# Patient Record
Sex: FEMALE | Race: WHITE
[De-identification: names, ages, dates, MRNs, and addresses within clinical notes are randomized per-mention and may not be internally consistent; named-entity substitution may affect disease eponyms.]

---
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
## 2023-03-12 ENCOUNTER — HOSPITAL ENCOUNTER (EMERGENCY)
Age: 18
Discharge: HOME | End: 2023-03-12
Payer: COMMERCIAL

## 2023-03-12 VITALS
TEMPERATURE: 97.88 F | RESPIRATION RATE: 22 BRPM | HEART RATE: 106 BPM | SYSTOLIC BLOOD PRESSURE: 144 MMHG | OXYGEN SATURATION: 100 % | DIASTOLIC BLOOD PRESSURE: 102 MMHG

## 2023-03-12 VITALS — BODY MASS INDEX: 25.4 KG/M2

## 2023-03-12 DIAGNOSIS — F41.9: Primary | ICD-10-CM

## 2023-03-12 DIAGNOSIS — F17.290: ICD-10-CM

## 2023-03-12 PROCEDURE — 99283 EMERGENCY DEPT VISIT LOW MDM: CPT

## 2024-02-13 ENCOUNTER — HOSPITAL ENCOUNTER (OUTPATIENT)
Dept: HOSPITAL 100 - BFHLAB | Age: 19
Discharge: HOME | End: 2024-02-13
Payer: COMMERCIAL

## 2024-02-13 DIAGNOSIS — F41.1: ICD-10-CM

## 2024-02-13 DIAGNOSIS — Z00.01: Primary | ICD-10-CM

## 2024-02-13 LAB
ALANINE AMINOTRANSFER ALT/SGPT: 32 U/L (ref 13–56)
ALBUMIN SERPL-MCNC: 3.7 G/DL (ref 3.2–5)
ALKALINE PHOSPHATASE: 55 U/L (ref 45–117)
ANION GAP: 7 (ref 5–15)
AST(SGOT): 17 U/L (ref 15–37)
BUN SERPL-MCNC: 8 MG/DL (ref 7–18)
BUN/CREAT RATIO: 10.8 RATIO (ref 10–20)
CALCIUM SERPL-MCNC: 8.8 MG/DL (ref 8.5–10.1)
CARBON DIOXIDE: 24 MMOL/L (ref 21–32)
CHLORIDE: 110 MMOL/L (ref 98–107)
CHOLEST SERPL-MCNC: 169 MG/DL
DEPRECATED RDW RBC: 45.6 FL (ref 35.1–43.9)
ERYTHROCYTE [DISTWIDTH] IN BLOOD: 13.6 % (ref 11.6–14.6)
EST GLOM FILT RATE - AFR AMER: 128 ML/MIN (ref 60–?)
GLOBULIN: 3.7 G/DL (ref 2.2–4.2)
GLUCOSE: 85 MG/DL (ref 74–106)
HCT VFR BLD AUTO: 38.2 % (ref 37–47)
HGB BLD-MCNC: 12.2 G/DL (ref 12–15)
IMMATURE GRANULOCYTES COUNT: 0.01 X10^3/UL (ref 0–0)
MCV RBC: 91.2 FL (ref 81–99)
MEAN CORP HGB CONC: 31.9 G/DL (ref 32–36)
MEAN PLATELET VOL.: 10.6 FL (ref 6.2–12)
NRBC FLAGGED BY ANALYZER: 0 % (ref 0–5)
PLATELET # BLD: 246 K/MM3 (ref 150–450)
POTASSIUM: 3.8 MMOL/L (ref 3.5–5.1)
RBC # BLD AUTO: 4.19 M/MM3 (ref 4.2–5.4)
TRIGLYCERIDES: 55 MG/DL
VLDLC SERPL-MCNC: 11 MG/DL (ref 5–40)
WBC # BLD AUTO: 3.8 K/MM3 (ref 4.4–11)

## 2024-02-13 PROCEDURE — 36415 COLL VENOUS BLD VENIPUNCTURE: CPT

## 2024-02-13 PROCEDURE — 85025 COMPLETE CBC W/AUTO DIFF WBC: CPT

## 2024-02-13 PROCEDURE — 80061 LIPID PANEL: CPT

## 2024-02-13 PROCEDURE — 80053 COMPREHEN METABOLIC PANEL: CPT

## 2025-02-03 ENCOUNTER — HOSPITAL ENCOUNTER (OUTPATIENT)
Dept: HOSPITAL 100 - MTRAD | Age: 20
Discharge: HOME | End: 2025-02-03
Payer: COMMERCIAL

## 2025-02-03 DIAGNOSIS — M54.2: Primary | ICD-10-CM

## 2025-02-03 PROCEDURE — 72072 X-RAY EXAM THORAC SPINE 3VWS: CPT

## 2025-02-03 PROCEDURE — 72050 X-RAY EXAM NECK SPINE 4/5VWS: CPT

## 2025-02-03 NOTE — RAD_ITS
PROCEDURE:  
CERV SPINE 4 OR 5 VIEWS  
   
REASON FOR EXAM:  
Pain and stiffness.  
   
TECHNIQUE:  
5 views of the cervical spine.  
   
COMPARISON:  
None.  
   
FINDINGS:  
Normal vertebral body heights.  No visible fracture.  
Disc space heights are preserved.  
Normal alignment.  
Neural foramina are normal.  
Facets are unremarkable.  
Prevertebral soft tissues are unremarkable.  
   
ORDER #: [id number] RAD/Cerv Spine 4 or 5 Views  
IMPRESSION:  
NEGATIVE CERVICAL SPINE.  
   
   
Electronically Signed By: Ketan Martines on 02/03/2025 17:12  
Reading Location: [facility]

## 2025-02-03 NOTE — XMS RPT_ITS
Comprehensive CCD (C-CDA v2.1)  
  
                          Created on: February 3, 2025  
  
  
Miss Deanna Rowell  
External Reference #: CDR,PersonID:[id number]  
: 2005  
Sex: Female  
  
Demographics  
  
  
                                        Address             [address] [place]  [address]  
   
                                        Mobile Phone        [phone]  
   
                                        Home Phone          [phone]  
   
                                        Preferred Language  en  
   
                                        Marital Status      Single  
   
                                        [employer] Affiliation Unknown  
   
                                        Race                White  
   
                                        Ethnic Group        Not  or Lati  
no  
  
  
Author  
  
  
                                        Organization        [facility]  
  
  
Care Team Providers  
  
  
                                Care Team Member Name Role            Phone  
   
                                UNKNOWN, PCP    Unavailable     Unavailable  
   
                                ALIZE ANN  Unavailable     Unavailable  
   
                                [facility] Unavailable     Unavail  
able  
   
                                Ashley Sierra DO Primary Care Provider [id number]  
[phone]  
   
                                Ashley Sierra DO Primary Care Provider [id number]  
[phone]  
   
                                ASHLEY SIERRA  Primary Care    Unavailable  
   
                                ASHLEY SIERRA  Primary Care    Unavailable  
   
                                Ashley Sierra DO Primary Care Provider [phone]  
[phone]  
   
                                Ashley Sierra  Unavailable     Unavailable  
   
                                Александр Matt Unavailable     Unavailable  
   
                                Ashley Sierra  Unavailable     [phone]  
   
                                Rigo, Dr. Ashley Olmos [facility]    Unavailabl  
e  
   
                                Vernell, Dr. Александр ELAINE Attending       Unavaila  
ble  
   
                                Meenu Ng Attending       Unavailable  
   
                                Sierra, Dr. Ashley Olmos [facility]    Unavailabl  
e  
   
                                Ng, Meenu Referring       Unavailable  
   
                                NgMeenu Attending       Unavailable  
   
                                Sierra, Dr. Ashley Olmos [facility]    Unavailabl  
e  
   
                                Ng, Meenu Referring       Unavailable  
   
                                Ng, Meenu Attending       Unavailable  
   
                                Sierra, Dr. Ashley Olmos [facility]    Unavailabl  
e  
   
                                NgMeenu Referring       Unavailable  
   
                                NgMeenu Attending       Unavailable  
   
                                Sierra, Dr. Ashley Olmos [facility]    Unavailabl  
e  
   
                                Ng, Meenu Referring       Unavailable  
   
                                Sierra, Dr. Ashley Olmos [facility]    Unavailabl  
e  
   
                                Meenu Ng Attending       Unavailable  
   
                                Ashley Sierra DO Primary Care Provider [phone]  
[phone]  
   
                                AMALIA MARQUEZ Referring       Unavailable  
   
                                ASHLEY SIERRA  Primary Care    Unavailable  
   
                                AMALIA MARQUEZ Referring       Unavailable  
   
                                AMALIA MARQUEZ Attending       Unavailable  
   
                                ASHLEY SIERRA  Primary Care    Unavailable  
   
                                ASHLEY SIERRA  Primary Care    Unavailable  
   
                                SALLYSOCORRO PERLA  Attending       Unavailable  
   
                                ASHLEY SIERRA  Primary Care    Unavailable  
   
                                SOCORRO ZAVALA  Attending       Unavailable  
   
                                ASHLEY SIERRA  Primary Care    Unavailable  
   
                                SALLYSOCORRO  Referring       Unavailable  
   
                                ASHLEY SIERRA  Primary Care    Unavailable  
   
                                SALLY, SOCORRO  Attending       Unavailable  
   
                                ASHLEY SIERRA  Primary Care    Unavailable  
   
                                DAAMALIA ELLIS Attending       Unavailable  
   
                                DAFELICITASIRENADEREK, AMALIA Referring       Unavailable  
   
                                SIERRAASHLEY GAN  Primary Care    Unavailable  
   
                                DAHLIRENADEREK, AMALIA Referring       Unavailable  
   
                                SIERRA, ASHLEY GARCIA  Primary Care    Unavailable  
   
                                MONEY, NETO   Referring       Unavailable  
   
                                ASHLEY SIERRA  Primary Care    Unavailable  
   
                                AMALIA MARQUEZ Attending       Unavailable  
   
                                MONEY, NETO   Attending       Unavailable  
   
                                SIERRAASHLEY GAN  Primary Care    Unavailable  
   
                                MONEY, NETO   Attending       Unavailable  
   
                                SIERRAASHLEY  Primary Care    Unavailable  
   
                                SIERRAASHLEY GAN  Primary Care    Unavailable  
   
                                DAHLHAUSEN, AMALIA Referring       Unavailable  
  
  
  
Allergies  
  
  
                                                    Allergy   
Classification                          Reported   
Allergen(s)               Allergy Type              Date of   
Onset                     Reaction(s)               Facility  
   
                                                    colesevelam  
(1 source)   colesevelam  Drug Allergy 2022   Hives, Itching [facility]  
Work Phone:   
[phone]  
2  
   
                                                      
(20 sources)                            colesevelam;   
Translations:   
[COLESEVELAM]   Drug Allergy    2022      Hives, Itching  [facility]  
Work Phone:   
[phone]  
0  
   
                                                      
(20 sources)                            Amoxicillin;   
Translations:   
[amoxicillin]   Drug Allergy    2023      Rash            [facility]  
Work Phone:   
[phone]  
0  
   
                                                      
(1 source)   colesevelam  Drug Allergy              Rash, Hives  [facility]  
  
  
  
Medications  
Current Medications  
  
  
  
                      Medication Drug Class(es) Dates      Sig (Normalized) Sig   
(Original)  
   
                                                    amitriptyline   
hydrochloride 10 mg   
oral tablet  
(20 sources)                            Tricyclic   
Antidepressant                          Start:   
2023                                          Amitriptyline HCl   
- 10 MG Oral   
Tablet Quantity: 0   
Refills: 0   
Ordered:   
2023 DO   
Start :   
2023 Active  
  
  
  
                                                    Start: 2023  
End: 04-                         take 1 tablet by mouth once   
daily at bedtime                        amitriptyline (ELAVIL) 10 mg tablet   
Indications: Irritable bowel   
syndrome with diarrhea Take 1 tablet   
by mouth daily at bedtime. 30 tablet   
5 10/13/2023 Active  
  
  
  
                                        Comment on above:   Take 1 tablet by saleem  
th daily at bedtime.   
   
                                                    clotrimazole 10 mg   
oral lozenge  
(4 sources)               Azole Antifungal          Start:   
  
3  
End:   
  
3                                                   clotrimazole   
(MYCELEX) 10 mg   
vic Use 1 Vic   
as instructed five   
times daily for 14   
days. 70 tablet 0   
2023 Active  
   
                                        Comment on above:   Use 1 Vic as inst  
ructed five times daily for 14 days.   
   
                                                    doxycycline hyclate   
100 mg oral capsule  
(2 sources)                             Tetracycline-class   
Drug                                    Start:   
  
End:   
                                       take 1 capsule   
by mouth twice   
daily                                   doxycycline hyclate   
(VIBRAMYCIN) 100 mg   
capsule Take 1   
capsule by mouth two   
times a day for 7   
days. 14 capsule 0   
2024 Active  
  
  
  
                                                    Start: 2022  
End: 2022                         take 1 tablet by mouth twice   
daily                                   doxycycline (VIBRA-TABS) 100 mg   
tablet Indications: Chlamydial   
infection Take 1 tablet by mouth   
twice daily for 7 days. 14 tablet 0   
2022 Active  
  
  
  
                                        Comment on above:   Take 1 tablet by saleem  
 twice daily for 7 days.   
   
                                                            Take 1 capsule by mo  
[facility] two times a day for 7 days.   
   
                                                    Ethinyl Estradiol /   
Norethindrone  
(20 sources)              Estrogen                  Start:   
2024                              take 1 tablet by   
mouth once daily                        Norethin-Eth   
Estrad Triphasic   
(ORTHO-NOVUM   
, ,)   
0.5/0.75/1 mg- 35   
mcg per tablet   
Take 1 tablet by   
mouth once daily.   
84 tablet 3   
2024 Active  
  
  
  
                                                    Start: 2024  
End: 2024                         take 1 tablet by mouth once   
daily                                   Norethin-Eth Estrad Triphasic   
(ORTHO-NOVUM , 28,) 0.5/0.75/1   
mg- 35 mcg per tablet Take 1 tablet   
by mouth once daily. 84 tablet 3   
2024 Active  
   
                                                    Start: 2023  
End: 2024                         take 1 tablet by mouth once   
daily                                   Norethin-Eth Estrad Triphasic   
(ORTHO-NOVUM , ,) 0.5/0.75/1   
mg- 35 mcg per tablet Take 1 tablet   
by mouth once daily. 84 tablet 3   
2023 Discontinued  
   
                                        Start: 2023   take 1 tablet by saleem  
th once   
daily                                   Norethin-Eth Estrad Triphasic   
(ORTHO-NOVUM , ,) 0.5/0.75/1   
mg- 35 mcg per tablet Take 1 tablet   
by mouth once daily. 84 tablet 3   
2023 Active  
   
                                                    Start: 2023  
End: 2023                         take 1 tablet by mouth once   
daily                                   Norethin-Eth Estrad Triphasic   
(ORTHO-NOVUM , ,) 0.5/0.75/1   
mg- 35 mcg per tablet Take 1 tablet   
by mouth once daily. 84 tablet 3   
2023 Active  
   
                                        Start: 04-   take 1 tablet by saleem  
th once   
daily                                   Norethin-Eth Estrad Triphasic   
(ORTHO-NOVUM , ,) 0.5/0.75/1   
mg- 35 mcg per tablet Take 1 tablet   
by mouth once daily. 28 tablet 0   
04/10/2023 Active  
   
                                                    Start: 2023  
End: 04-                         take 1 tablet by mouth once   
daily                                   Norethin-Eth Estrad Triphasic   
(ORTHO-NOVUM , 28,) 0.5/0.75/1   
mg- 35 mcg per tablet Take 1 tablet   
by mouth once daily. 28 tablet 0   
2023 04/10/2023 Discontinued  
   
                                        Start: 2022   take 1 tablet by saleem  
th once   
daily                                   Norethin-Eth Estrad Triphasic   
(ORTHO-NOVUM , ,) 0.5/0.75/1   
mg- 35 mcg per tablet Take 1 tablet   
by mouth once daily. 84 tablet 3   
2022 Active  
  
  
  
                                        Comment on above:   Take 1 tablet by saleem  
th once daily.   
   
                                                    fluconazole 150 mg   
oral tablet  
(1 source)                Azole Antifungal          Start:   
  
End:   
                                     take 1 tablet by   
mouth once daily                        fluconazole   
(DIFLUCAN) 150 mg   
tablet Take 1 tablet   
by mouth once daily   
for 1 day. 1 tablet 0   
2022   
Active  
   
                                        Comment on above:   Take 1 tablet by saleem  
th once daily for 1 day.   
   
                                                    gabapentin 100 mg   
oral capsule  
(5 sources)                             Anti-epileptic   
Agent                                   Start:   
10-13-2  
023  
End:   
                                     take 3 capsules by   
mouth twice daily                       gabapentin   
(NEURONTIN) 100 mg   
capsule Take 3   
capsules by mouth two   
times a day for 90   
days. 180 capsule 2   
10/13/2023 2024   
Active  
   
                                        Comment on above:   Take 3 capsules by m  
outh two times a day for 90 days.   
   
                                                    iv contrast (will   
be provided with   
radiology test)  
(2 sources)                                         Start:   
  
End:   
                                     inject 1 dose   
intravenously once                      iv contrast (will be   
provided with   
radiology test) MRI   
Brain Inject,   
intravenously, once   
for 1 dose.No IV   
access, insert saline   
lock prior to   
beginning of   
sedation, infusion,   
injection of imaging   
exam.Discontinue   
saline lock post   
exam. If Pt. has a   
central line or IVAD,   
may access for   
administration   
according to line   
specific nursing   
protocol.Once exam is   
complete flush line   
and de-access   
according to line   
specific nursing   
protocol in the MR   
contrast   
administration   
guidelines link 1   
Each 0 2024 Active  
  
  
  
                                                    Start: 10-  
End: 10-           inject 1 dose intravenously once iv contrast (will be   
provided   
with   
radiology test) CTA Head/Neck W No   
IV access, insert saline lock   
prior to the sedation, infusion,   
injection for imaging exam.   
Discontinue saline lock post exam.   
If Pt. has a central line or IVAD,   
may access for administration   
according to line specific nursing   
protocol. Once exam is complete   
flush line and de-access according   
to line specific nursing protocol   
in the CT contrast administration   
guidelines link. 1 Each 0   
10/13/2023 10/14/2023 Active  
  
  
  
                                        Comment on above:   CTA Head/Neck W No I  
V access, insert saline lock prior to the   
sedation, infusion, injection for imaging exam. Discontinue saline   
lock post exam. If Pt. has a central line or IVAD, may access for   
administration according to line specific nursing protocol. Once   
exam is complete flush line and de-access according to line   
specific nursing protocol in the CT contrast administration   
guidelines link.   
   
                                                    methylPREDNISolone  
(12 sources)              Corticosteroid            Start:   
2024  
End:   
2024                                          methylPREDNISolone (MEDROL,   
SHANNON,) 4 mg Dose-Pack Take   
according to instructions on   
package 21 tablet 0   
2024 Active  
  
  
  
                                Start: 2023                 methylPREDNISo  
lone (MEDROL DOSE-PACK) 4 mg Dose-Pack   
Indications:   
Headache, unspecified headache type As Instructed per package 21   
tablet 0 2023 Active  
  
  
  
                                        Comment on above:   As Instructed per john ying   
   
                                                    metroNIDAZOLE 500 mg   
oral tablet  
(1 source)                              Nitroimidazole   
Antimicrobial                           Start:   
  
End:   
                                     take 1 tablet   
by mouth twice   
daily                                   metroNIDAZOLE   
(FLAGYL) 500 mg   
tablet Indications:   
Bacterial vaginosis   
Take 1 tablet by   
mouth twice daily for   
7 days. 14 tablet 0   
2022   
Active  
   
                                        Comment on above:   Take 1 tablet by saleem  
 twice daily for 7 days.   
   
                                                    naproxen 500 mg oral   
tablet  
(4 sources)                             Nonsteroidal   
Anti-inflammatory   
Drug                                    Start:   
10-13-2  
023  
End:   
                                     take 1 tablet   
by mouth every   
twelve hours as   
needed                                  naproxen (NAPROSYN)   
500 mg tablet Take 1   
tablet by mouth two   
times a day as needed   
(for pain. Take with   
food.). 30 tablet 0   
10/13/2023 2023   
Active  
   
                                        Comment on above:   [facility] (for pain.   
Take   
with food.).   
   
                                                    nitrofurantoin,   
macrocrystals 25 mg /   
nitrofurantoin,   
monohydrate 75 mg   
oral capsule  
(2 sources)                             Nitrofuran   
Antibacterial                           Start:   
  
End:   
                                     take 1 capsule   
by mouth twice   
daily at   
mealtime                                nitrofurantoin   
monohydrate and   
macrocrystal   
(MACROBID) 100 mg   
capsule Take 1   
capsule by mouth   
twice daily with   
meals for 5 days. 10   
capsule 0 2022 Active  
   
                                        Comment on above:   Take 1 capsule by mo  
uth twice daily with meals for 5 days.   
   
                                                    ondansetron 4 mg oral   
tablet  
(20 sources)                            Serotonin-3 Receptor   
Antagonist                              Start:   
  
End:   
                                     take 1 tablet   
by mouth every   
twelve hours as   
needed                                  ondansetron (ZOFRAN)   
4 mg tablet Take 1   
tablet by mouth every   
12 hours as needed   
for nausea/vomiting   
(for nausea.). 56   
tablet 2 2023 Active  
  
  
  
                                                    Start: 2023  
End: 2023                         take 1 tablet by mouth every   
eight hours as needed for   
nausea                                  ondansetron orally disintegrating   
(ZOFRAN ODT) 4 mg disintegrating   
tablet Indications: Nausea and   
vomiting, unspecified vomiting type   
Take 1 tablet by mouth every 8 hours   
as needed for nausea/vomiting. 10   
tablet 0 2023   
Discontinued  
   
                                                            take 1 tablet by saleem  
th every   
eight hours as needed                   ondansetron (ZOFRAN) 4 mg tablet Take   
4 mg by mouth every 8 hours as needed.   
Taking as needed. Active  
  
  
  
                                        Comment on above:   Take 1 tablet by saleem  
th every 8 hours as needed for   
nausea/vomiting.   
   
                                                            Take 1 tablet by saleem  
th every 12 hours as needed for   
nausea/vomiting (for nausea.).   
   
                                                            Take 4 mg by mouth e  
very 8 hours as needed. Taking as needed.   
   
                                                    rimegepant 75 mg   
disintegrating oral   
tablet  
(2 sources)                                         Start:   
  
End:   
                                     take 1   
tablet by   
mouth once   
daily as   
needed                                  rimegepant (NURTEC   
ODT) 75 mg   
disintegrating   
tablet Indications:   
Migraine with aura   
and without status   
migrainosus, not   
intractable Take 1   
tablet by mouth once   
daily as needed (at   
onset of migraine).   
8 tablet 5   
2024 Active  
   
                                                    rizatriptan 5 mg   
disintegrating oral   
tablet  
(4 sources)                             Serotonin-1b and   
Serotonin-1d Receptor   
Agonist                                 Start:   
  
End:   
                                     take 1   
tablet by   
mouth every   
two hours as   
needed                                  rizatriptan 5 mg   
disintegrating   
tablet Take 1 tablet   
(5 mg) by mouth as   
needed. May repeat   
dose after 2 hours   
if needed. Maximum   
daily dose is 30 mg   
per day. 12 tablet   
2024 Active  
   
                                                    sulfamethoxazole 800 mg   
/ trimethoprim 160 mg   
oral tablet  
(4 sources)                             Dihydrofolate Reductase   
Inhibitor   
Antibacterial,   
Sulfonamide   
Antimicrobial                           Start:   
  
023  
End:   
                                     take 1   
tablet by   
mouth twice   
daily                                   sulfamethoxazole-tri  
methoprim (BACTRIM   
DS) 800-160 mg per   
tablet Take 1 tablet   
by mouth twice daily   
for 7 days. 14   
tablet 0 2023 Active  
  
  
  
                                                    Start: 08-  
End: 2022                         take 1 tablet by mouth   
twice daily                             sulfamethoxazole-trimethoprim (BACTRIM D  
S)   
800-160 mg per tablet Take 1 tablet by mouth   
twice daily for 7 days. 14 tablet 0   
08/10/2022 2022 Active  
   
                                                    Start: 2022  
End: 2022                         take 1 tablet by mouth   
twice daily                             sulfamethoxazole-trimethoprim (BACTRIM D  
S)   
800-160 mg per tablet Indications: Finger   
infection Take 1 tablet by mouth twice daily   
for 10 days. 20 tablet 0 2022 Active  
  
  
  
                                        Comment on above:   Take 1 tablet by saleem  
 twice daily for 10 days.   
   
                                                            Take 1 tablet by saleem  
 twice daily for 7 days.   
   
                                                    topiramate 25 mg   
oral tablet  
(7 sources)                                         Start:   
  
4  
End:   
03-  
5                                       take 1 tablet by   
mouth once daily   
at bedtime                              topiramate   
(TOPAMAX) 25 mg   
tablet Take 1   
tablet by mouth   
daily at bedtime.   
30 tablet 2   
12/10/2024   
03/10/2025 Active  
   
                                                    vancomycin 125 mg   
oral capsule  
(3 sources)                             Glycopeptide   
Antibacterial                           Start:   
  
3  
End:   
  
3                                       take 1 capsule   
by mouth four   
times daily                             vancomycin   
(VANCOCIN) 125 mg   
capsule   
Indications: C.   
difficile colitis   
Take 1 capsule by   
mouth four times   
daily for 10 days.   
40 capsule 0   
2023 Active  
   
                                        Comment on above:   Take 1 capsule by mo  
uth four times daily for 10 days.   
  
  
  
Completed/Discontinued Medications  
  
  
  
                      Medication Drug Class(es) Dates      Sig (Normalized) Sig   
(Original)  
   
                                                    acetaminophen 21.7   
mg/ml / butalbital   
3.33 mg/ml /   
caffeine 2.67 mg/ml   
oral solution  
(3 sources)                             Barbiturate, Central   
Nervous System   
Stimulant,   
Methylxanthine                          Start:   
08-  
End:   
2024                                          acetaminophen 325   
mg-caffeine 40   
mg-butalbital 50   
mg (FIORICET) per   
15 mL oral liquid   
08/15/2024   
2024   
Discontinued  
   
                                                    amoxicillin 500 mg   
oral capsule  
(3 sources)                             Penicillin-class   
Antibacterial                           Start:   
04-  
End:   
2023                              take 1 capsule by   
mouth twice daily                       amoxicillin   
(AMOXIL) 500 mg   
capsule Take 1   
capsule by mouth   
twice daily for 10   
days. 20 capsule 0   
04/10/2023   
2023   
Discontinued   
(Allergic   
response)  
   
                                        Comment on above:   Take 1 capsule by mo  
uth twice daily for 10 days.   
   
                                                    azithromycin 250 mg   
oral tablet  
(2 sources)                             Macrolide   
Antimicrobial                           Start:   
2023  
End:   
2023                                          azithromycin   
(ZITHROMAX) 250 mg   
tablet TAKE 2   
TABLETS BY MOUTH   
ON DAY 1, AND THEN   
TAKE 1 TABLET BY   
MOUTH ONCE A DAY   
ON DAY 2 THROUGH   
DAY 5 0 2023   
Discontinued   
(Course of therapy   
completed)  
  
  
  
                                                    Start: 2022  
End: 2022           take 2 tablets by mouth once azithromycin (ZITHROMAX)   
500 mg   
tablet   
Take 2 tablets by mouth one time only   
for 1 dose. 2 tablet 0 2022 Active  
  
  
  
                                        Comment on above:   Take 2 tablets by [facility] one time only for 1 dose.   
   
                                                            TAKE 2 TABLETS BY [facility] ON DAY 1, AND THEN TAKE 1 TABLET   
BY MOUTH ONCE A DAY ON DAY 2 THROUGH DAY 5   
   
                                                    busPIRone hydrochloride 7.5   
mg oral tablet  
(20 sources)                                        Start:   
2023                                          busPIRone HCl - 7.5 MG   
Oral Tablet Quantity:   
0 Refills: 0 Ordered:   
2023 DO Start :   
2023 Active  
  
  
  
                                                    Start: 2023  
End: 10-                         take 1 tablet by mouth twice   
daily                                   busPIRone (BUSPAR) 7.5 mg tablet   
Indications: Generalized anxiety   
disorder with panic attacks Take 1   
tablet by mouth twice daily. 180   
tablet 1 2023 10/18/2023   
Active  
   
                                                            take 1 tablet by saleem  
 once   
daily at bedtime                        busPIRone (BUSPAR) 7.5 mg tablet   
Take 7.5 mg by mouth daily at   
bedtime. Active  
  
  
  
                                        Comment on above:   Take 7.5 mg by mouth  
 twice daily.   
   
                                                            Take 1 tablet by saleem  
th twice daily.   
   
                                                            Take 7.5 mg by mouth  
 daily at bedtime.   
   
                                                    cephalexin 500 mg oral   
capsule  
(6 sources)                             Cephalosporin   
Antibacterial                           Start:   
2023  
End:   
2023                                          Cephalexin 500 MG Oral   
Capsule Quantity: 0   
Refills: 0 Ordered:   
2023 DO Start :   
2023 End :   
1-Aug-2023 Complete  
  
  
  
                                                    Start: 2023  
End: 2023                         take 1 capsule by mouth twice   
daily                                   cephALEXin (KEFLEX) 500 mg capsule   
Take 1 capsule by mouth twice daily   
for 7 days. 14 capsule 0 2023 Active  
   
                                                    Start: 2023  
End: 2023                         take 1 capsule by mouth twice   
daily                                   cephALEXin (KEFLEX) 500 mg capsule   
Take 1 capsule by mouth twice daily   
for 10 days. 20 capsule 0   
2023 Active  
   
                                                    Start: 2023  
End: 2023                         take 1 capsule by mouth twice   
daily                                   cephALEXin (KEFLEX) 500 mg capsule   
Take 1 capsule by mouth twice daily   
for 7 days. 14 capsule 0 2023 Active  
  
  
  
                                        Comment on above:   Take 1 capsule by mo  
uth twice daily for 7 days.   
   
                                                            Take 1 capsule by mo  
uth twice daily for 10 days.   
   
                                                    Dasetta    
0.5/0.75/1-35 MG-MCG   
Oral Tablet  
(4 sources)                                         Start:   
20                                                  Dasetta    
0.5/0.75/1-35   
MG-MCG Oral Tablet   
Quantity: 0   
Refills: 0 Ordered:   
2023 DO   
Start : 2023   
Active  
   
                                                    fluticasone propionate   
0.05 mg/actuat metered   
dose nasal spray  
(9 sources)               Corticosteroid            Start:   
20                                      take 2   
spray(s) by   
mouth once   
daily                                   fluticasone   
(FLONASE) 50   
mcg/actuation nasal   
spray Indications:   
Otitis media with   
effusion, right Use   
2 Sprays in each   
nostril once daily.   
Rinse mouth after   
use. 1 Each 1   
2023 Active  
   
                                        Comment on above:   Use 2 Sprays in each  
 nostril once daily. Rinse mouth after   
use.   
   
                                                    10 ml lidocaine   
hydrochloride 10 mg/ml   
injection  
(1 source)                              Antiarrhythmic, Amide   
Local Anesthetic                        Start:   
20  
End:   
20                                                  lidocaine (PF) 10   
mg/mL (1 %) 4 mL   
injection   
(XYLOCAINE)  
  
  
  
                                                    Start: 2023  
End: 2023                                     lidocaine (PF) 10 mg/mL (1 %  
) 4 mL injection (XYLOCAINE)  
  
  
  
                                                    loperamide   
hydrochloride 2 mg   
oral tablet  
(10 sources)              Opioid Agonist            Start:   
2023                                          loperamide HCl   
(IMODIUM) 2 mg tab   
Indications: Chronic   
diarrhea Take 1   
tablet by mouth as   
needed. Take 2 tabs   
first thing in the   
AM and one   
additional tab after   
each episode up to 8   
tabs per day. 240   
tablet 2 2023   
Active  
   
                                        Comment on above:   Take 1 tablet by saleem  
th as needed. Take 2 tabs first thing in   
the   
AM and one additional tab after each episode up to 8 tabs per   
day.   
   
                                                    magnesium oxide 420   
mg oral tablet  
(11 sources)                                        Start:   
2023                              take 0.9524   
tablet by   
mouth once   
daily                                   Magnesium Oxide 420   
mg tab Indications:   
Nausea and vomiting,   
unspecified vomiting   
type , Headache,   
unspecified headache   
type Take 0.9524   
tablets by mouth   
once daily. 30   
tablet 0 2023   
Active  
   
                                        Comment on above:   Take 0.9524 tablets   
by mouth once daily.   
   
                                                    pseudoephedrine   
hydrochloride 30 mg   
oral tablet  
(9 sources)                             alpha-Adrenergic   
Agonist                                 Start:   
2023                              take 1   
tablet by   
mouth every   
four hours   
as needed                               pseudoephedrine   
(SUDAFED) 30 mg   
tablet Indications:   
Otitis media with   
effusion, right Take   
1 tablet by mouth   
every 4 hours as   
needed for   
cold/allergy   
symptoms. 30 tablet   
1 2023 Active  
   
                                        Comment on above:   Take 1 tablet by saleem  
th every 4 hours as needed for   
cold/allergy   
symptoms.   
   
                                                    1 ml triamcinolone   
acetonide 40 mg/ml   
injection  
(1 source)                Corticosteroid            Start:   
2023  
End: 2023                                     triamcinolone   
acetonide 40 mg   
injection (KeNALog   
40)  
  
  
  
                                                    Start: 2023  
End: 2023                                     triamcinolone acetonide 40 m  
g injection (KeNALog 40)  
  
  
  
Problems  
Active Problems  
  
  
                      Problem Classification Problem    Date       Documented Da  
te Episodic/Chronic  
   
                                                    Allergic reactions  
(1 source)                              Allergy status to   
penicillin;   
Translations:   
[Allergy status to   
penicillin]                             Onset:   
2023                                          Episodic  
   
                                                    Anxiety disorders  
(4 sources)                             Anxiety;   
Translations:   
[Anxiety disorder,   
unspecified]                                                Chronic  
   
                                                    Crushing injury or   
internal injury  
(10 sources)                            Crushing injury of   
finger; Translations:   
[Crushing injury of   
finger(s)]                              Onset:   
2023                Episodic  
   
                                                    Diseases of mouth;   
excluding dental  
(1 source)                              Disorder of tongue;   
Translations: [Other   
disturbances of oral   
epithelium, including   
tongue]                                                     Episodic  
   
                                                    E Codes: Other   
specified and   
classifiable  
(1 source)                              Caught, crushed,   
jammed, or pinched   
between moving   
objects, initial   
encounter;   
Translations:   
[Caught, crush,   
jammed, or pinched   
betw moving objects,   
init]                                   Onset:   
2023                                          Episodic  
   
                                                    External Injury -   
Transport; not MVT  
(1 source)                              Animal-rider injured   
by fall from or being   
thrown from horse in   
noncollision   
accident, initial   
encounter;   
Translations:   
[ANIML-RIDR INJURED   
BY FALL FR HORSE IN   
NONCLSN ACC, INIT]                      Onset:   
10-                                            
   
                                                    Genitourinary symptoms   
and ill-defined   
conditions  
(3 sources)                             Increased frequency   
of urination;   
Translations:   
[Frequency of   
micturition]                                                Episodic  
   
                                                    Headache; including   
migraine  
(20 sources)                            Episodic tension-type   
headache;   
Translations:   
[Episodic   
tension-type   
headache, not   
intractable]                            Onset:   
10-                10-                Chronic  
   
                                                    Headache; including   
migraine  
(3 sources)                             Headache;   
Translations:   
[Headache,   
unspecified headache   
type]                                                       Episodic  
   
                                                    Headache; including   
migraine  
(1 source)                              Headache; including   
migraine;   
Translations: [New   
onset headache]                         Onset:   
2024                                            
   
                                                    Immunizations and   
screening for   
infectious disease  
(1 source)                              Patient encounter   
status; Translations:   
[Encounter for   
screening for   
infections with a   
predominantly sexual   
mode of transmission]                                         Episodic  
   
                                                    Inflammatory diseases   
of female pelvic   
organs  
(1 source)                              Abscess of vulva;   
Translations:   
[Abscess of vulva]                                          Episodic  
   
                                                    Nausea and vomiting  
(8 sources)                             Nausea with vomiting,   
unspecified;   
Translations:   
[Vomiting without   
nausea]                                 Onset:   
10-                                          Episodic  
   
                                                    Noninfectious   
gastroenteritis  
(1 source)                              Chronic diarrhea;   
Translations:   
[Noninfective   
gastroenteritis and   
colitis, unspecified]                                         Episodic  
   
                                                    Nonspecific chest pain  
(2 sources)                             Chest pain,   
unspecified;   
Translations: [Chest   
discomfort]                             Onset:   
2022                                          Episodic  
   
                                                    Open wounds of   
extremities  
(2 sources)                             Avulsion injury of   
fingernail;   
Translations:   
[Unspecified open   
wound of unspecified   
finger with damage to   
nail, subsequent   
encounter]                              Onset:   
2023                                          Episodic  
   
                                                    Other connective   
tissue disease  
(1 source)                              Biceps tendinitis;   
Translations:   
[Bicipital   
tendinitis, left   
shoulder]                                                   Episodic  
   
                                                    Other ear and sense   
organ disorders  
(1 source)                              Impacted cerumen in   
right ear;   
Translations:   
[Impacted cerumen,   
right ear]                                                  Episodic  
   
                                                    Other female genital   
disorders  
(1 source)                              Vaginal discharge;   
Translations: [Other   
specified   
noninflammatory   
disorders of vagina]                                         Episodic  
   
                                                    Other gastrointestinal   
disorders  
(2 sources)                             Irritable bowel   
syndrome with   
diarrhea;   
Translations:   
[Irritable bowel   
syndrome with   
diarrhea]                                                   Chronic  
   
                                                    Other gastrointestinal   
disorders  
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   
milk; Translations:   
[Malabsorption due to   
intolerance, not   
elsewhere classified]                                         Chronic  
   
                                                    Other gastrointestinal   
disorders  
(1 source)                              Diarrhea,   
unspecified;   
Translations: [Nausea   
vomiting and   
diarrhea]                               Onset:   
2022                                          Episodic  
   
                                                    Other injuries and   
conditions due to   
external causes  
(1 source)                              Neurapraxia;   
Translations: [Injury   
to nerves,   
unspecified site]                       2023          Episodic  
   
                                                    Other injuries and   
conditions due to   
external causes  
(1 source)                              Injury of digital   
nerve of right little   
finger, initial   
encounter;   
Translations: [Injury   
of digital nerve of   
right little finger,   
init encntr]                            Onset:   
2023                                          Episodic  
   
                                                    Other injuries and   
conditions due to   
external causes  
(4 sources)                             Injury of head;   
Translations:   
[Unspecified injury   
of head, subsequent   
encounter]                              10-          Episodic  
   
                                                    Other nervous system   
disorders  
(2 sources)                             Anesthesia of skin;   
Translations:   
[Anesthesia of skin]                    Onset:   
2023                                          Episodic  
   
                                                    Other nervous system   
disorders  
(1 source)                              Paresthesia of skin;   
Translations:   
[Paresthesia of skin]                   Onset:   
2023                                          Episodic  
   
                                                    Other screening for   
suspected conditions   
(not mental disorders   
or infectious disease)  
(1 source)                              CT of abdomen   
abnormal;   
Translations:   
[Abnormal findings on   
diagnostic imaging of   
other abdominal   
regions, including   
retroperitoneum]                                            Episodic  
   
                                                    Other skin disorders  
(1 source)                              Eruption;   
Translations: [Rash   
and other nonspecific   
skin eruption]                                              Episodic  
   
                                                    Other skin disorders  
(1 source)                              Folliculitis;   
Translations:   
[Follicular disorder,   
unspecified]                            2024          Episodic  
   
                                                    Other upper   
respiratory infections  
(5 sources)                             Streptococcal sore   
throat; Translations:   
[Streptococcal   
pharyngitis]                                                Episodic  
   
                                                    Otitis media and   
related conditions  
(1 source)                              Otitis media;   
Translations:   
[Unspecified   
nonsuppurative otitis   
media, right ear]                                           Episodic  
   
                                                    Residual codes;   
unclassified  
(2 sources)                             Pain; Translations:   
[Pain, unspecified]                                         Episodic  
   
                                                    Residual codes;   
unclassified  
(1 source)                              Procedure not done;   
Translations:   
[Procedure and   
treatment not carried   
out, unspecified   
reason]                                 10-          Episodic  
   
                                                    Skin and subcutaneous   
tissue infections  
(1 source)                              Infection of finger;   
Translations: [Local   
infection of the skin   
and subcutaneous   
tissue, unspecified]                                         Episodic  
   
                                                    Substance-related   
disorders  
(1 source)                              Nicotine dependence,   
other tobacco   
product,   
uncomplicated;   
Translations:   
[Nicotine dependence,   
other tobacco   
product,   
uncomplicated]                          Onset:   
2023                                          Chronic  
   
                                                    Unclassified  
(2 sources)                             [facility] [place]   
[facility] RT PINKY IN   
DOOR,, [facility] CLAIM                        2023            
   
                                        Comment on above:   [facility] [place]  
[facility] RT PINKY IN DOOR,, [facility] CLAIM   
   
                                                    Unclassified  
(1 source)                              Crushing injury of   
finger, initial   
encounter                               2023            
   
                                                    Unclassified  
(1 source)      Neurapraxia                     2023        
   
                                                    Unclassified  
(1 source)                              Caught,crush,jam or   
pinch betw a moving   
statnry obj,init;   
Translations:   
[Caught,crush,jam or   
pinch betw a moving   
statnry obj,init]                       Onset:   
2023                                            
   
                                                    Urinary tract   
infections  
(1 source)                              Recurrent urinary   
tract infection;   
Translations:   
[Urinary tract   
infection, site not   
specified]                              2023          Episodic  
  
  
Past or Other Problems  
  
  
                      Problem Classification Problem    Date       Documented Da  
te Episodic/Chronic  
   
                                                    Abdominal pain  
(7 sources)                             Left upper quadrant   
pain; Translations:   
[Generalized   
abdominal pain]                         Onset:   
10-                                          Episodic  
   
                                                    Biliary tract disease  
(20 sources)                            Biliary dyskinesia;   
Translations:   
[Other specified   
diseases of   
gallbladder]                            Onset:   
2021                Episodic  
   
                                                    Blindness and vision   
defects  
(3 sources)                             Subjective visual   
disturbance;   
Translations:   
[Unspecified   
subjective visual   
disturbances]                           Onset:   
11-                10-                Episodic  
   
                                                    Conditions associated   
with dizziness or   
vertigo  
(4 sources)                             Dizziness and   
giddiness;   
Translations:   
[Dizziness]                             Onset:   
10-                10-                Episodic  
   
                                                    Intracranial injury  
(6 sources)                             History of   
concussion injury   
of brain;   
Translations:   
[Personal history   
of traumatic brain   
injury]                                 Onset:   
11-                10-                Episodic  
   
                                                    Other injuries and   
conditions due to   
external causes  
(2 sources)                             Unspecified injury   
of left hip,   
initial encounter;   
Translations:   
[Unspecified injury   
of left elbow,   
initial encounter]                      Onset:   
10-                                          Episodic  
   
                                                    Other injuries and   
conditions due to   
external causes  
(1 source)                              Unspecified injury   
of head, subsequent   
encounter;   
Translations:   
[Injury of head,   
subsequent   
encounter]                              Onset:   
11-                                          Episodic  
   
                                                    Other non-traumatic   
joint disorders  
(3 sources)                             Pain in left elbow;   
Translations: [Pain   
in left hip]                            Onset:   
10-                                          Episodic  
   
                                                    Other nutritional;   
endocrine; and   
metabolic disorders  
(20 sources)                            Childhood obesity;   
Translations:   
[Overweight]                            Onset:   
2021  
Resolved:   
2023                Episodic  
   
                                                    Residual codes;   
unclassified  
(20 sources)                            Immunization not   
carried out because   
of patient refusal;   
Translations:   
[Vaccination not   
carried out because   
of patient refusal]                     Onset:   
06-                06-                Episodic  
   
                                                    Spondylosis;   
intervertebral disc   
disorders; other back   
problems  
(20 sources)                            Chronic low back   
pain; Translations:   
[Chronic left-sided   
low back pain   
without sciatica]                       Onset:   
2017                Episodic  
  
  
  
Results  
  
  
                      Test Name  Value      Interpretation Reference Range Facil  
ity  
   
                                                    HUMBERTO BY IFA WITH REFLEXon    
   
                                                    Nuclear Ab Ql   
(S)             Negative        Normal          Negative        [facility]  
   
                                                    Comment on   
above:                                  Order Comment: Specimen Type: BLOOD SPEC  
IMENOrdering Facility:   
[facility] Address: [address] [place] [address]   
   
                                                            Result Comment: Anti  
-nuclear antibody test is used as an aid in   
diagnosis of systemic autoimmune diseases. Where positive and   
clinically warranted, follow-up using disease-specific testing is   
recommended. Low positive titers are not uncommon with advanced age,   
certain chronic infections, and malignancies among others.  
Test methodology: Indirect fluorescence immunoassay (IFA) using HEp-2   
cells.   
   
                                                            Performed By: #### A  
NAIFR ####[facility] LABCLIA   
[id number] [facility] [id number] [address] [place]   
[place]   
   
                                                    TSH SerPl-aCncon 2024   
   
                      TSH Qn     2.750 m[IU]/L Normal     0.270-4.200 [facility]  
   
                                                    Comment on   
above:                                  Order Comment: Specimen Type: BLOOD SPEC  
IMENOrdering Facility:   
[facility] Address: [facility] [place] [id number]   
   
                                                            Result Comment: If t  
he patient is pregnant, TSH reference range   
varies by gestational period:  
First Trimester (weeks 9-12): 0.180-2.990 mIU/L  
Second Trimester: 0.110-3.980 mIU/L  
Third Trimester: 0.480-4.710 mIU/L  
Thomas LAWRENCE et al. A Practical Approach for the Verifications and   
Determination of Site- and Trimester-Specific Reference Intervals for   
Thyroid Function tests in Pregnancy. Thyroid, 2019:29:3:412-420.   
Jhoan GAN, et al. 2017 Guidelines of the American Thyroid   
Association for the Diagnosis and Management of Thyroid Disease   
during Pregnancy and the Postpartum. Thyroid, 2017:27:3:315-389.   
   
                                                            Performed By: #### 2  
132-9, 3016-3 ####[facility] [id number] [place] [id number]   
[place]   
   
                                                    VITAMIN B1 (THIAMINE), WHOLE  
 BLOODon 2024   
   
                                                    Thiamine (Bld)   
[Moles/Vol]     118.0 nmol/L    Normal          84.3-213.3      [facility]  
   
                                                    Comment on   
above:                                  Order Comment: Specimen Type: BLOOD SPEC  
IMENOrdering Facility:   
[facility] Address: [address] [place] [address]   
   
                                                            Result Comment: This  
 assay measures the concentration of thiamine   
diphosphate (TDP), the primary active form of vitamin B1.   
Approximately 90 percent of vitamin B1 present in whole blood is TDP.   
Thiamine and thiamine monophosphate, which comprise the remaining 10   
percent, are not measured.  
This test was developed, and its performance characteristics   
determined by the [facility] Department of Pathology and   
Laboratory Medicine. It has not been cleared or approved by the FDA.   
The [facility] Department of Pathology and Laboratory Medicine   
is regulated under CLIA as qualified to perform high-complexity   
testing. This test is used for clinical purposes. It should not be   
regarded as investigational or for research.   
   
                                                            Performed By: #### B  
1WB ####[facility] LABIA   
[id number] [place] [id number] [place]   
[place]   
   
                                                    VITAMIN B6/PYRIDOXINon    
   
                      VITAMIN B6 28.6 nmol/L Normal     20.0-125.0 [facility]  
   
                                                    Comment on   
above:                                  Order Comment: Specimen Type: BLOOD SPEC  
IMENOrdering Facility:   
[facility] Address: [address] [place] [address]   
   
                                                            Result Comment: INTE  
RPRETIVE INFORMATION: Vitamin B6 (Pyridoxal   
5-Phosphate)  
Pyridoxal 5'-phosphate measured in a specimen collected following  
an 8-hour or overnight fast accurately indicates vitamin B6  
nutritional status. Non-fasting specimen concentration reflects  
recent vitamin intake.  
This test was developed and its performance characteristics  
determined by [facility]. It has not been cleared or  
approved by the US Food and Drug Administration. This test was  
performed in a CLIA certified laboratory and is intended for  
clinical purposes.  
Performed By: [facility]  
[address] [place] [address]  
: Alber Webster MD, PhD  
CLIA Number: [id number]   
   
                                                            Performed By: #### V  
ITB6 ####[facility] Baptist[facility] [id number]   
[place] [address]   
   
                                                    Vit B12 [facility] [place]   
024   
   
                                                    Cobalamin   
(Vitamin B12)   
[Mass/Vol]      234 pg/mL       Normal          232-1245        [facility]  
   
                                                    Comment on   
above:                                  Order Comment: Specimen Type: BLOOD SPEC  
IMENOrdering Facility:   
[facility] Address: [address] [place] [address]   
   
                                                            Performed By: #### 2  
132-9, 3016-3 ####[facility]   
LABCLIA [id number] [address] OF [place]   
   
                                                    CNPNon 2024   
   
                                        CNPN                Telephone (NEUR)  
-------------------------  
-----  
DEANNA ROWELL   
([id number]) 1 F  
Date Time Provider   
Department  
24 AMALIA MARQUEZ [facility]  
During your visit today,   
we recorded the following   
information about you:  
Angelic Boone OCCA   
2024 1:11 PM Signed  
Received insurance   
authorization approval   
from [employer] for   
MRI/CAT Scan.  
Start date: 2024  
End date:2024  
Reference #[id number]  
Allergies As of Date:   
2024 Noted Allergy   
Reaction  
WELCHOL (COLESEVELAM)   
2022 4 - Hives  
9 - Itching  
Date Reviewed: 2024  
Reviewed by: Amalia Marquez APRN.CNP - Fully   
Assessed  
Reason for Visit:  
Insurance Authorization   
[[id number]] Cmt: Received   
insurance authorization  
approval from [employer] for MRI/CAT  
Scan.  
Start date: 2024  
End  
date:2024  
Reference  
#[id number]  
Prescriptions as of   
2024  
- acetaminophen 325   
mg-caffeine 40   
mg-butalbital 50 mg   
(FIORICET) per 15 mL  
oral liquid  
- rizatriptan 5 mg   
disintegrating tablet  
Take 1 tablet (5 mg) by   
mouth as needed. May   
repeat dose after 2 hours   
if  
needed. Maximum daily   
dose is 30 mg per day.  
- topiramate (TOPAMAX) 25   
mg tablet  
Take 1 tablet by mouth   
daily at bedtime.  
- Norethin-Eth Estrad   
Triphasic (ORTHO-NOVUM   
, 28,) 0.5/0.75/1   
mg- 35 mcg  
per tablet  
Take 1 tablet by mouth   
once daily.  
- busPIRone (BUSPAR) 7.5   
mg tablet  
Take 7.5 mg by mouth   
daily at bedtime.  
- amitriptyline (ELAVIL)   
10 mg tablet  
Take 1 tablet by mouth   
daily at bedtime.  
- ondansetron (ZOFRAN) 4   
mg tablet  
Take 4 mg by mouth every   
8 hours as needed. Taking   
as needed.  
Problem List As Of Date   
2024 Noted Resolved  
Episodic tension-type   
headache, not   
intractable*10/12/2016  
Chronic left-sided low   
back pain without   
sciati*2017  
Biliary dyskinesia   
[K82.8] 2021  
Childhood overweight, BMI   
85-94.9 percentile   
[E*2021  
COVID-19 vaccination   
declined [Z28.21]   
06/15/2022  
Encounter Number:   
[id number]  
Encounter Status:Closed   
by ANGELIC BOONE on   
24             Normal                                  [facility]  
   
                                                    MR Brain WO and W contrast I  
Von 2024   
   
                                                            IMPRESSION:  
  
No acute findings. No   
acute infarction,   
intracranial hemorrhage,  
intracranial mass lesion   
or abnormal intracranial   
enhancement.  
  
  
  
  
: ABDOUL  
Transcribe Date/Time: Sep   
17 2024 2:08P  
  
Dictated by : LIAM NARVAEZ MD  
  
This examination was   
interpreted and the   
report reviewed and  
electronically signed by:  
LIAM NARVAEZ MD on   
Sep 17 2024 2:14PM EST  
  
                                                            DIVISION OF   
RADIOLOGY  
   
                                                            * * *Final Report* *  
 *  
  
DATE OF EXAM: Sep 17 2024   
1:30PM  
  
[facility] 0295 - MRI BRAIN WO/W   
IVCON / ACCESSION #   
[id number]  
  
PROCEDURE REASON:   
multiple diagnoses  
  
* * * * Physician   
Interpretation * * * *  
  
EXAMINATION: MRI BRAIN   
WO/W IVCON  
  
CLINICAL HISTORY:   
Headaches  
  
TECHNIQUE: Routine brain   
MRI protocol without and   
with contrast  
including diffusion   
images.  
MQ: MRBWOW_2  
  
Contrast: 12 mL Dotarem   
IV  
  
COMPARISON: Head CT   
10/27/2023..  
  
RESULT:  
  
Acute Change: There is no   
evidence of restricted   
diffusion to suggest  
an acute infarct.  
  
Hemorrhage: No evidence   
of prior parenchymal   
hemorrhage on the  
gradient echo images.  
  
Mass Lesion/ Mass Effect:   
No evidence of an   
intracranial mass or  
extra-axial fluid   
collection. No abnormal   
parenchymal or   
leptomeningeal  
enhancement is noted   
following contrast   
administration. No   
significant  
mass effect.  
  
Chronic Change: The white   
matter is within normal   
limits of signal  
intensity for age.  
  
Parenchyma: No   
significant volume loss   
for age. The brain   
parenchyma  
is otherwise within   
normal limits of signal   
intensity and morphology.  
  
Ventricles: Normal   
caliber and morphology.  
  
Skull Base: Hypothalamic   
and pituitary region are   
grossly normal.  
Craniocervical junction   
is normal. No significant   
marrow replacement  
process.  
  
Vasculature: Major   
intracranial arterial   
structures, and dural   
venous  
sinuses show typical flow   
void, suggesting patency   
by spin echo criteria.  
  
Other: The visualized   
paranasal sinuses and   
mastoid air cells are   
clear.  
The orbits and   
extracranial soft tissues   
are unremarkable.  
  
                                                            DIVISION OF   
RADIOLOGY  
   
                                                            Provider, [facility] - 2024   
Formatting of this note   
might be different from   
the original.  
* * *Final Report* * *  
  
DATE OF EXAM: Sep 17 2024   
1:30PM  
  
[facility] 0295 - MRI BRAIN WO/W   
IVCON / ACCESSION #   
[id number]  
  
PROCEDURE REASON:   
multiple diagnoses  
  
* * * * Physician   
Interpretation * * * *  
  
EXAMINATION: MRI BRAIN   
WO/W IVCON  
  
CLINICAL HISTORY:   
Headaches  
  
TECHNIQUE: Routine brain   
MRI protocol without and   
with contrast  
including diffusion   
images.  
MQ: MRBWOW_2  
  
Contrast: 12 mL Dotarem   
IV  
  
COMPARISON: Head CT   
10/27/2023..  
  
RESULT:  
  
Acute Change: There is no   
evidence of restricted   
diffusion to suggest  
an acute infarct.  
  
Hemorrhage: No evidence   
of prior parenchymal   
hemorrhage on the  
gradient echo images.  
  
Mass Lesion/ Mass Effect:   
No evidence of an   
intracranial mass or  
extra-axial fluid   
collection. No abnormal   
parenchymal or   
leptomeningeal  
enhancement is noted   
following contrast   
administration. No   
significant  
mass effect.  
  
Chronic Change: The white   
matter is within normal   
limits of signal  
intensity for age.  
  
Parenchyma: No   
significant volume loss   
for age. The brain   
parenchyma  
is otherwise within   
normal limits of signal   
intensity and morphology.  
  
Ventricles: Normal   
caliber and morphology.  
  
Skull Base: Hypothalamic   
and pituitary region are   
grossly normal.  
Craniocervical junction   
is normal. No significant   
marrow replacement  
process.  
  
Vasculature: Major   
intracranial arterial   
structures, and dural   
venous  
sinuses show typical flow   
void, suggesting patency   
by spin echo criteria.  
  
Other: The visualized   
paranasal sinuses and   
mastoid air cells are   
clear.  
The orbits and   
extracranial soft tissues   
are unremarkable.  
  
  
IMPRESSION  
IMPRESSION:  
  
No acute findings. No   
acute infarction,   
intracranial hemorrhage,  
intracranial mass lesion   
or abnormal intracranial   
enhancement.  
  
  
  
  
: [facility]  
Transcribe Date/Time: Sep   
17 2024 2:08P  
  
Dictated by : LIAM NARVAEZ MD  
  
This examination was   
interpreted and the   
report reviewed and  
electronically signed by:  
LIAM NARVAEZ MD on   
Sep 17 2024 2:14PM EST  
  
  
                                                            [facility]  
   
                                                    Radiology Study   
observation   
(narrative)                                                     [facility]  
   
                                                    MR Brain WO and W contrast I  
VOrdered By: [facility] Provider on 2024   
   
                                                                  [facility]  
   
                                                    MRI BRAIN WO/W IVCONon    
   
                                                    MRI BRAIN WO/W   
IVCON                                   * * *Final Report* * *  
DATE OF EXAM: Sep 17 2024   
1:30PM  
[facility] 0295 - MRI BRAIN WO/W   
IVCON / ACCESSION #   
[id number]  
PROCEDURE REASON:   
multiple diagnoses  
  
* * * * Physician   
Interpretation * * * *  
EXAMINATION: MRI BRAIN   
WO/W IVCON  
CLINICAL HISTORY:   
Headaches  
TECHNIQUE: Routine brain   
MRI protocol without and   
with contrast  
including diffusion   
images.  
MQ: MRBWOW_2  
Contrast: 12 mL Dotarem   
IV  
COMPARISON: Head CT   
10/27/2023..  
RESULT:  
Acute Change: There is no   
evidence of restricted   
diffusion to suggest  
an acute infarct.  
Hemorrhage: No evidence   
of prior parenchymal   
hemorrhage on the  
gradient echo images.  
Mass Lesion/ Mass Effect:   
No evidence of an   
intracranial mass or  
extra-axial fluid   
collection. No abnormal   
parenchymal or   
leptomeningeal  
enhancement is noted   
following contrast   
administration. No   
significant  
mass effect.  
Chronic Change: The white   
matter is within normal   
limits of signal  
intensity for age.  
Parenchyma: No   
significant volume loss   
for age. The brain   
parenchyma  
is otherwise within   
normal limits of signal   
intensity and morphology.  
Ventricles: Normal   
caliber and morphology.  
Skull Base: Hypothalamic   
and pituitary region are   
grossly normal.  
Craniocervical junction   
is normal. No significant   
marrow replacement  
process.  
Vasculature: Major   
intracranial arterial   
structures, and dural   
venous  
sinuses show typical flow   
void, suggesting patency   
by spin echo criteria.  
Other: The visualized   
paranasal sinuses and   
mastoid air cells are   
clear.  
The orbits and   
extracranial soft tissues   
are unremarkable.  
IMPRESSION:  
No acute findings. No   
acute infarction,   
intracranial hemorrhage,  
intracranial mass lesion   
or abnormal intracranial   
enhancement.  
: PSCB  
Transcribe Date/Time: Sep   
17 2024 2:08P  
Dictated by : LIAM NARVAEZ MD  
This examination was   
interpreted and the   
report reviewed and  
electronically signed by:  
LIAM NARVAEZ MD on   
Sep 17 2024 2:14PM EST  
[id number] Normal                                  [facility]  
   
                                                    CNOVon 2024   
   
                                        CNOV                Office Visit (NEURMM  
)  
-------------------------  
-----  
DEANNA ROWELL   
([id number]) 1 F  
Date Time Provider   
Department  
24 8:00 AM   
AMALIA MARQUEZ   
NEUR  
During your visit today,   
we recorded the following   
information about you:  
Pulse Blood pressure   
Weight Height  
96/minute 114/76 61.9 kg   
1.651 m  
Last Period  
24  
Amalia Marquez,   
APRN.CNP 2024 8:54   
AM Signed  
[facility]   
[facility] [place]  
Follow-up Visit  
Follow-up note  
August 15, 2024  
HPI: Ms. Rowell presents   
today for a follow-up   
visit.  
Per her previous visit on   
23:  
S09.90XD Injury of head,   
subsequent encounter   
(primary encounter   
diagnosis)  
Z87.820 History of   
concussion  
M54.2 Cervical pain  
R42 Dizziness  
H53.10 Subjective visual   
disturbance  
Comment: Pt with pmh   
concussion (x8), anxiety   
previously seen for   
concussion.  
She reports she was   
riding a horse that   
bucked and threw her   
forward.  
Following the event she   
suffered a severe   
headache, cervical pain,   
peripheral  
vision loss, intermittent   
blurred vision,   
dizziness/vertigo, and L   
facial  
numbness. For further   
evaluation CT brain/neck   
and CTA head/neck were  
completed and   
unremarkable. She was   
started on gabapentin 100   
mg twice daily  
for treatment of symptoms   
as well as Naproxen 500mg   
BID prn. At time of  
appointment today she   
reports that all symptoms   
have resolved. She has   
since  
stopped taking gabapentin   
without recurrence of   
symptoms. She reports no  
further head injuries. As   
she has no further   
symptoms, no additional   
work-up  
or treatment is necessary   
at this time. Recommend   
continuing to avoid  
activities that may lead   
to higher susceptibility   
of head injury. She may  
follow-up as needed.  
Headaches have been going   
on for about 3 weeks;   
nonstop. Has dealt with  
headaches the past couple   
weeks. Happened suddenly   
and stayed for one week.  
Sometimes throws   
neck/back out when riding   
horses and will go to see   
chiro.  
Goes once monthly for   
routine maintenance. Has   
gone 3x in the past   
month. No  
improvement at all. The   
day he adjusts her the sx   
become very severe.  
Two nights ago headache   
was the worse pain of her   
life.  
Spoke to MD in the office   
she works at who rec'd   
heat/ice. Was sent a  
prescription for Fioricet   
by an MD at the office.  
Headache pulls on the   
back of her eyes. Stuck   
around forehead. Took 1-2   
hours  
after taking medication.   
Was then given Imitrex   
which she took last   
night.  
Tried Imitrex last night   
and had severe SE;   
vomiting, muscle   
weakness, felt  
cold and confused. Went   
to bed but headache   
became severe and woke up   
again at  
2:30 and hasn't slept   
since.  
Had n/t on L with severe   
HA two nights ago. Only   
time this has happened.   
Could  
not focus when driving;   
no depth perception.  
Has tried Tylenol and   
Aleve; avoids NSAIDS d/t   
ulcer. Uses icy hot   
patches on  
neck.  
HEADACHE LOCATION:   
frontal region bilateral   
and retroorbital   
bilateral  
Onset: Headaches began   
6-8 years ago (middle   
school); only occasional   
headaches  
after that time and no   
migraines for years after  
Quality:  aching ,   
 pressure , and   
 stabbing   
Photophobia? No   
Phonophobia? Yes  
Nausea? Yes Vomitting?   
Yes  
Aura? Yes blurred vision   
and motor (eye twitching)  
Worse with activity? No  
Frequency: daily   
headaches but as of   
 just has been very   
severe  
Caffeine intake?   
Occasional red bull   
(2p/mos); does not help   
HA  
Water intake? Eight or   
more 8 ounce glasses   
daily.  
Sleep concerns? No  
History of concussion?   
Yes ; no injury since   
last appt. Wears helmet   
when  
riding regularly.  
Autonomic features: None   
Autonomic headache   
features -: none  
Previous/Current Headache   
treatment  
Preventative: None  
Rescue: Imitrex and   
Butalbital; Excedrin  
Positional? Yes (worse   
laying down); last   
November/December worse  
Double vision? No  
Worse with   
valsalva/sneeze/cough? No  
Plans for pregnancy? No  
Family Hx of migraines,   
aneurysms, brain tumors:   
Dad with migraines;   
sister  
with headaches and benign   
tumor; no aneurysm  
No focal weakness to   
extremities. When driving   
with L hand L hand can   
become  
numb. Last night felt   
dizzy when going to throw   
up.  
PAST MEDICAL HISTORY  
No date: Panic attacks  
PAST SURGICAL HISTORY  
2021: LAPAROSCOPIC   
CHOLECYSTECTOMY  
Current Outpatient   
Medications on File Prior   
to Visit  
Medication Sig  
Norethin-Eth Estrad   
Triphasic (ORTHO-NOVUM   
, 28,) 0.5/0.75/1   
mg- 35 mcg  
per tablet Take 1 tablet   
by mouth once daily.  
busPIRone (BUSPAR) 7.5 mg   
tablet Take 7.5 mg by   
mouth daily at bedtime.  
amitriptyline (ELAVIL) 10   
mg tablet Take 1 tablet   
by mouth daily at   
bedtime.  
ondansetron (ZOFRAN) 4 mg   
tablet Take 4 mg by mouth   
every 8 hours as needed.  
Taking as needed.  
No current   
facility-administered   
medications on file prior   
to visit.  
Social History  
Tobacco Use  
Smoking status: Never  
Passive exposure: Never  
Smokeless tobacco: Never  
Tobacco comments:  
VAPING-NICOTINE,DISPOSABL  
E PODS- NOTED 23-   
SS  
Vaping U (more content   
not included)...    Normal                                  [facility]  
   
                                                    Bacteria Ur Culton   
4   
   
                                                    Bacteria   
identified Cx   
Nom (U)                                 ORGANISM ID: 1  
<10,000 CFU/ml Normal   
urogenital vickie    Normal                                  [facility]  
   
                                                    Comment on   
above:                                  Performed By: #### 630-4 ####[facility]   
[id number] [address] [place]   
[place]   
   
                                                    CNOVon 2024   
   
                                        CNOV                Office Visit (OBGYWM  
)  
-------------------------  
-----  
DEANNA ROWELL   
([id number]) 1 F  
Date Time Provider   
Department  
24 2:00 PM [facility]AL[facility] O[facility]  
During your visit today,   
we recorded the following   
information about you:  
Blood pressure Weight   
Last Period  
98/58 60.2 kg 24  
Socorro Zavala APRN.CNP   
2024 2:21 PM Signed  
Chaperone offered:   
Patient declines.  
Deanna Rowell is a 18   
year old female who   
presents for problem   
visit vulvar  
cyst for 5 days.  
HPI: notice cyst last   
Thursday, it did drained   
yesterday and is much   
smaller.  
Also c/o dysuria x 3   
days.  
OB History  
 T0  L0  
SAB0 IAB0 Ectopic0   
Multiple0 Live Births0  
Gyn History  
LMP: 2024   
(Approximate), Having   
periods  
Age at Menarche:  
Age at First Pregnancy:  
Age at Menopause:  
Gyn History Comments:  
Sexual Activity: Yes;   
Male  
Contraception: Pill  
PAST MEDICAL HISTORY  
Diagnosis Date  
Panic attacks  
PAST SURGICAL HISTORY  
Procedure Laterality Date  
LAPAROSCOPIC   
CHOLECYSTECTOMY   
2021  
FAMILY HISTORY  
Problem Relation Age of   
Onset  
No Known Problems Father  
No Known Problems Mother  
No Known Problems Brother  
No Known Problems   
Maternal Grandfather  
Hypertension Maternal   
Grandmother  
Stroke Maternal   
Grandmother  
Heart Attack Paternal   
Grandfather  
No Known Problems   
Paternal Grandmother  
Hypertension Other  
maternal side  
Systemic Lupus   
Erythematosus Maternal   
Aunt  
Coronary Artery Disease   
No Family History  
Hyperlipidemia No Family   
History  
Diabetes No Family   
History  
Thyroid No Family History  
Blood Disease No Family   
History  
Blood Clots No Family   
History  
Factor 5 Leiden No Family   
History  
DVT No Family History  
Rheumatologic disease No   
Family History  
Mental illness No Family   
History  
Depression No Family   
History  
Bipolar disorder No   
Family History  
Schizophrenia No Family   
History  
Alzheimer's Disease No   
Family History  
Dementia No Family   
History  
Parkinson?s Disease No   
Family History  
Tremor No Family History  
Essential Tremor No   
Family History  
Aneurysm No Family   
History  
COPD No Family History  
Kidney Disease No Family   
History  
Breast Cancer No Family   
History  
Cervical Cancer No Family   
History  
Uterine Cancer No Family   
History  
Ovarian cancer No Family   
History  
Social History  
Tobacco Use  
Smoking status: Never  
Passive exposure: Never  
Smokeless tobacco: Never  
Tobacco comments:  
VAPING-NICOTINE,DISPOSABL  
E PODS- NOTED 23-   
  
Vaping Use  
Vaping Use: current   
everyday user  
Substances: Nicotine  
Substance Use Topics  
Alcohol use: No  
Drug use: No  
Current Outpatient   
Medications  
Medication Sig  
Norethin-Eth Estrad   
Triphasic (ORTHO-NOVUM   
, 28,) 0.5/0.75/1   
mg- 35 mcg  
per tablet Take 1 tablet   
by mouth once daily.  
busPIRone (BUSPAR) 7.5 mg   
tablet Take 7.5 mg by   
mouth daily at bedtime.  
amitriptyline (ELAVIL) 10   
mg tablet Take 1 tablet   
by mouth daily at   
bedtime.  
ondansetron (ZOFRAN) 4 mg   
tablet Take 4 mg by mouth   
every 8 hours as needed.  
Taking as needed.  
No current   
facility-administered   
medications for this   
visit.  
Allergies As of Date:   
2024  
Allergen Noted Reaction  
WELCHOL [COLESEVELAM]   
2022 Hives and   
Itching  
Fully Assessed 2024  
REVIEW OF SYSTEMS  
Bladder: No gross   
hematuria, urinary   
frequency, urinary   
urgency, or  
incontinence.  
Expanded ROS: N/A  
Allergies and current   
medication updated:Yes  
EXAM: Wt 132 lb 12.8 oz   
(60.2kg)   LMP 2024  
GENERAL: pleasant,   
 female in no   
apparent distress  
HEENT: Normocephalic,   
atraumatic, mucus   
membranes moist, and no   
lesions  
CHEST: Normal inspiratory   
effort  
PELVIC: external   
genitalia normal, normal   
Bartholin's glands,   
urethra, Pearsall's  
glands, physiologic   
discharge present, normal   
appearing perineal body   
and  
perianal region,   
resolving folliculitis  
NEURO: alert and oriented   
x3,exam grossly non-focal  
EXTREMITIES: normal  
ASSESSMENT/PLAN:  
1. Folliculitis - ICD9:   
704.8, ICD10: L73.9   
(primary diagnosis)  
resolving  
2. Dysuria - ICD9: 788.1,   
ICD10: R30.0  
acute  
- Send urine for culture  
- Patient education for   
prevention given  
- URINE CULTURE  
Will notify patient of   
test results.  
NAYAN Zamora.BRIAN  
Medical Decision Making:  
Problems:  
Low: Acute, uncomplicated   
illness or injury  
Data:  
Unique test(s) ordered: 2  
Risk:  
Low: Low risk from   
testing/treatment  
Medical Decision Making   
Level: 3 - Low  
Allergies As of Date:   
2024 Noted Allergy   
Reaction  
WELCHOL (COLESEVELAM)   
2022 4 - Hives  
9 - Itching  
Date Reviewed: 2024  
Reviewed by: Diana Mullen LPN - Fully   
Assessed  
Reason for Visit:  
Discussion [813]  
Primary Visit   
Diagnosis:Folliculitis   
[L73.9]  
Other Visit   
Diagnosis:Dysuria [R30.0]  
Order(s):URINE CULTURE   
[SQURCUL] Order #:   
[id number]  
Prescriptions as of   
2024  
- Norethin-Eth Estrad   
Triphasic (ORTHO-NOVUM   
/, 28,) 0.5/0.75/1   
mg- 35 mcg  
per tablet  
Take 1 tablet by mouth   
once daily.  
- busPIR (more content   
not included)...    [facility]                                  [facility]   
   
                                        [facility]                Telephone (OBGYWM)  
-------------------------  
-----  
DEANNA ROWELL   
([id number]) 1 F  
Date Time Provider   
Department  
24 JOSE RAMÍREZ  
During your visit today,   
we recorded the following   
information about you:  
Allergies As of Date:   
2024 Noted Allergy   
Reaction  
WELCHOL (COLESEVELAM)   
2022 4 - Hives  
9 - Itching  
Date Reviewed: 2024  
Reviewed by: Ritu Gonzalez MA - Fully   
Assessed  
Reason for Visit:  
Results [95]  
Order(s):doxycycline   
hyclate (VIBRAMYCIN) 100   
mg capsuleTake 1 capsule   
by  
mouth two times a day for   
7 days.Disp: 14   
capsuleRfl: 0  
Prescriptions as of   
2024  
- doxycycline hyclate   
(VIBRAMYCIN) 100 mg   
capsule  
Take 1 capsule by mouth   
two times a day for 7   
days.  
- busPIRone (BUSPAR) 7.5   
mg tablet  
Take 7.5 mg by mouth   
daily at bedtime.  
- amitriptyline (ELAVIL)   
10 mg tablet  
Take 1 tablet by mouth   
daily at bedtime.  
- ondansetron (ZOFRAN) 4   
mg tablet  
Take 4 mg by mouth every   
8 hours as needed. Taking   
as needed.  
- Norethin-Eth Estrad   
Triphasic (ORTHO-NOVUM   
, 28,) 0.5/0.75/1   
mg- 35 mcg  
per tablet  
Take 1 tablet by mouth   
once daily.  
Problem List As Of Date   
2024 Noted Resolved  
Episodic tension-type   
headache, not   
intractable*10/12/2016  
Chronic left-sided low   
back pain without   
sciati*2017  
Biliary dyskinesia   
[K82.8] 2021  
Childhood overweight, BMI   
85-94.9 percentile   
[E*2021  
COVID-19 vaccination   
declined [Z28.21]   
06/15/2022  
Prescriptions ordered   
this encounter Disp   
Refills Start End  
DOXYCYCLINE HYCLATE 100   
MG CAPSULE 14 c* 0   
2024  
Route: ORAL  
Sig: Take 1 capsule by   
mouth two times a day for   
7 days.  
Encounter Number:   
[id number]  
Encounter Status:Closed   
by JOSE RAMÍREZ on   
24             Normal                                  [facility]  
   
                                                    BACTERIAL VAGINOSIS NAATon 0  
2024   
   
                                                    Lactobacillus   
crispatus+fernanda  
i+jensenii +   
Gardnerella   
vaginalis +   
Atopobium   
vaginae rRNA   
KASSIDY+probe Ql   
(Vag fld)           Negative            Normal              Negative for   
bacterial   
vaginosis                               [facility]  
   
                                                    Comment on   
above:                                  Order Comment: Specimen Type: SWABLenorei  
valarie Facility: [facility] Address: [address] [place] [address]   
   
                                                            Performed By: #### JAMAR PRIETO, [id number] ####[facility]   
LABCLIA [id number] [place] [address]   
[place]   
   
                                                    C. trachomatis+N. gonorrhoea  
e DNA KASSIDY+probe Ql (Unsp spec)on 2024   
   
                                                    C. trachomatis   
rRNA KASSIDY+probe   
Ql (Unsp spec)      Negative            Normal              Negative for   
Chlamydia   
trachomatis by   
amplificaton                            [facility]  
   
                                                    Comment on   
above:                                  Order Comment: Specimen Type: SWABOrderi  
ng Facility: [facility] Address: [address] [place] [address]   
   
                                                            Performed By: #### JAMAR PRIETO, 69787-1 ####[facility]   
LABCLIA [id number] [place] [address]   
[place]   
   
                                                    N. gonorrhoeae   
rRNA KASSIDY+probe   
Ql (Unsp spec)      Negative            Normal              Negative for   
Neisseria   
gonorrhoeae by   
amplification                           [facility]  
   
                                                    Comment on   
above:                                  Order Comment: Specimen Type: SWABCHI [facility]  
 Facility: [facility] Address: [address] [place] [address]   
   
                                                            Performed By: #### JAMAR PRIETO, 52011-0 ####[facility]   
LABCLIA [id number] [place] [address]   
[place]   
   
                                                    MOLLY/TRICHOMONAS NAATon 0  
2024   
   
                                                    C. glabrata RNA   
KASSIDY+probe Ql   
(Vag fld)           Negative            Normal              Negative for   
Candida   
glabrata                                [facility]  
   
                                                    Comment on   
above:                                  Order Comment: Specimen Type: SWABCHI [facility]  
 Facility: [facility] Address: [address] [place] [address]   
   
                                                            Performed By: #### C  
VTV ####[facility] LABIA   
[id number] [place] [address] [place]   
[place]   
   
                                                    Candida sp DNA   
KASSIDY+probe Ql   
(Vag fld)           Negative            Normal              Negative for   
Candida species                         [facility]  
   
                                                    Comment on   
above:                                  Order Comment: Specimen Type: SWABCHI [facility]  
 Facility: [facility] Address: [address] [place] [address]   
   
                                                            Performed By: #### C  
VTV ####[facility] LABCLIA   
[id number] [place] [address] [place]   
   
                                                    T. vaginalis DNA   
KASSIDY+probe Ql   
(Unsp spec)         Negative            Normal              Negative for   
Trichomonas   
vaginalis by   
amplification                           [facility]  
   
                                                    Comment on   
above:                                  Order Comment: Specimen Type: SWABOrderi  
ng Facility: [facility] Address: [address] [place] [address]   
   
                                                            Performed By: #### C  
VTV ####[facility] LABCLIA   
[id number] [id number]   
OF [facility]   
   
                                                    CNOVon 2024   
   
                                        CNOV                Office Visit (OBGYWM  
)  
-------------------------  
-----  
DEANNA ROWELL   
([id number]) 1 F  
Date Time Provider   
Department  
24 1:30 PM SOCORRO ZAVALA OBGYWM  
During your visit today,   
we recorded the following   
information about you:  
Blood pressure Weight   
Last Period  
100/64 64.3 kg 24  
Socorro Zavala APRN.CNP   
2024 1:47 PM Signed  
Chaperone offered:   
Patient declines.  
Deanna Rowell is a 18   
year old female who   
presents for ongoing   
pelvic pain  
HPI: pt had a normal   
pelvic US and present   
today for vaginal   
cultures to rule  
out any infection. She   
has continue to have a   
sharp stabbing pain.  
OB History  
 T0  L0  
SAB0 IAB0 Ectopic0   
Multiple0 Live Births0  
Gyn History  
LMP: 2024   
(Approximate), Having   
periods  
Age at Menarche:  
Age at First Pregnancy:  
Age at Menopause:  
Gyn History Comments:  
Sexual Activity: Yes;   
Male  
Contraception: Pill  
PAST MEDICAL HISTORY  
Diagnosis Date  
Panic attacks  
PAST SURGICAL HISTORY  
Procedure Laterality Date  
LAPAROSCOPIC   
CHOLECYSTECTOMY   
2021  
FAMILY HISTORY  
Problem Relation Age of   
Onset  
No Known Problems Father  
No Known Problems Mother  
No Known Problems Brother  
No Known Problems   
Maternal Grandfather  
Hypertension Maternal   
Grandmother  
Stroke Maternal   
Grandmother  
Heart Attack Paternal   
Grandfather  
No Known Problems   
Paternal Grandmother  
Hypertension Other  
maternal side  
Systemic Lupus   
Erythematosus Maternal   
Aunt  
Coronary Artery Disease   
No Family History  
Hyperlipidemia No Family   
History  
Diabetes No Family   
History  
Thyroid No Family History  
Blood Disease No Family   
History  
Blood Clots No Family   
History  
Factor 5 Leiden No Family   
History  
DVT No Family History  
Rheumatologic disease No   
Family History  
Mental illness No Family   
History  
Depression No Family   
History  
Bipolar disorder No   
Family History  
Schizophrenia No Family   
History  
Alzheimer's Disease No   
Family History  
Dementia No Family   
History  
Parkinson?s Disease No   
Family History  
Tremor No Family History  
Essential Tremor No   
Family History  
Aneurysm No Family   
History  
COPD No Family History  
Kidney Disease No Family   
History  
Breast Cancer No Family   
History  
Cervical Cancer No Family   
History  
Uterine Cancer No Family   
History  
Ovarian cancer No Family   
History  
Social History  
Tobacco Use  
Smoking status: Never  
Passive exposure: Never  
Smokeless tobacco: Never  
Tobacco comments:  
VAPING-NICOTINE,DISPOSABL  
E PODS- NOTED 23-   
  
Vaping Use  
Vaping Use: current   
everyday user  
Substances: Nicotine  
Substance Use Topics  
Alcohol use: No  
Drug use: No  
Current Outpatient   
Medications  
Medication Sig  
busPIRone (BUSPAR) 7.5 mg   
tablet Take 7.5 mg by   
mouth daily at bedtime.  
amitriptyline (ELAVIL) 10   
mg tablet Take 1 tablet   
by mouth daily at   
bedtime.  
ondansetron (ZOFRAN) 4 mg   
tablet Take 4 mg by mouth   
every 8 hours as needed.  
Taking as needed.  
Norethin-Eth Estrad   
Triphasic (ORTHO-NOVUM   
/, 28,) 0.5/0.75/1   
mg- 35 mcg  
per tablet Take 1 tablet   
by mouth once daily.  
No current   
facility-administered   
medications for this   
visit.  
Allergies As of Date:   
2024  
Allergen Noted Reaction  
WELCHOL [COLESEVELAM]   
2022 Hives and   
Itching  
Fully Assessed 2024  
REVIEW OF SYSTEMS  
Abdomen: No bloating,   
early satiety,   
indigestion, or increased   
flatulence. No  
nausea, vomiting,   
diarrhea, or   
constipation.  
Bladder: No dysuria,   
gross hematuria, urinary   
frequency, urinary   
urgency, or  
incontinence.  
Expanded ROS: N/A  
Allergies and current   
medication updated:Yes  
EXAM: /64   Wt 141   
lb 12.8 oz (64.3kg)   LMP   
2024  
GENERAL: pleasant,   
 female in no   
apparent distress  
HEENT: Normocephalic,   
atraumatic, mucus   
membranes moist, and no   
lesions  
CHEST: Normal inspiratory   
effort  
PELVIC: external   
genitalia normal, normal   
Bartholin's glands,   
urethra, Pearsall's  
glands, no vulvar   
lesions, no cervical   
lesions, good vaginal   
support,  
physiologic discharge   
present, normal appearing   
perineal body and   
perianal  
region  
BIMANUAL: deferred  
NEURO: alert and oriented   
x3,exam grossly non-focal  
EXTREMITIES: normal  
ASSESSMENT/PLAN:  
1. Pelvic pain in female   
- ICD9: 625.9, ICD10:   
R10.2  
- BACTERIAL VAGINOSIS   
NAAT  
- CANDIDA/TRICHOMONAS   
NAAT  
- GONORRHEA/CHLAMYDIA   
NAAT  
- MYCOPLASMA CULT  
Will notify patient of   
test results.  
If results are negative   
will try to get MRI   
??endometriosis  
NAYAN Zamora.BRIAN  
Medical Decision Making:  
Problems:  
Moderate: New problem   
with uncertain prognosis  
Data:  
Unique test(s) ordered:   
3+  
Risk:  
Low: Low risk from   
testing/treatment  
Medical Decision Making   
Level: 4 - Moderate  
Allergies As of Date:   
2024 Noted Allergy   
Reaction  
WELCHOL (COLESEVELAM)   
2022 4 - Hives  
9 - Itching  
Date Reviewed: 2024  
Reviewed by: Ritu Gonzalez MA - Fully   
Assessed  
Reason for Visit:  
Vaginal Problem [117]  
Primary Visit   
Diagnosis:Pelvic pain in   
female [R10.2]  
Order(s (more content not   
included)...        Normal                                  [facility]  
   
                                                    Mycoplasma Spec Culton    
   
                                                    Mycoplasma sp   
identified Org   
specific cx Nom   
(Unsp spec)                             CULTURE, MYCOPLASMA:  
Negative for Mycoplasma   
hominis  
Positive for Ureaplasma   
urealyticum         Abnormal                                [facility]  
   
                                                    Comment on   
above:                                  Performed By: #### 6486-5 ####[facility] LABCLIA   
[id number] [place] [address] [place]   
[place]   
   
                                                    CNPNon 2024   
   
                                        CNPN                Telephone (OBGYWM)  
-------------------------  
-----  
DEANNA ROWELL   
([id number]) 1 F  
Date Time Provider   
Department  
24 SOCORRO ZAVALA  
During your visit today,   
we recorded the following   
information about you:  
Socorro Zavala APRN.CNP   
2024 10:13 AM Signed  
Please let the patient   
know that her pelvic   
ultrasound is normal. If   
she is  
still having the pelvic   
pain I would recommend   
that she returns for   
vaginal  
cultures to rule out any   
type of infection.  
NAYAN Zamora.Ana Woods LPN   
2024 2:54 PM Signed  
Patient notified  
Allergies As of Date:   
2024 Noted Allergy   
Reaction  
WELCHOL (COLESEVELAM)   
2022 4 - Hives  
9 - Itching  
Date Reviewed: 2024  
Reviewed by: Diana Mullen LPN - Fully   
Assessed  
Reason for Visit:  
Results [95]  
Prescriptions as of   
2024  
- busPIRone (BUSPAR) 7.5   
mg tablet  
Take 7.5 mg by mouth   
daily at bedtime.  
- amitriptyline (ELAVIL)   
10 mg tablet  
Take 1 tablet by mouth   
daily at bedtime.  
- ondansetron (ZOFRAN) 4   
mg tablet  
Take 4 mg by mouth every   
8 hours as needed. Taking   
as needed.  
- Norethin-Eth Estrad   
Triphasic (ORTHO-NOVUM   
, 28,) 0.5/0.75/1   
mg- 35 mcg  
per tablet  
Take 1 tablet by mouth   
once daily.  
Problem List As Of Date   
2024 Noted Resolved  
Episodic tension-type   
headache, not   
intractable*10/12/2016  
Chronic left-sided low   
back pain without   
sciati*2017  
Biliary dyskinesia   
[K82.8] 2021  
Childhood overweight, BMI   
85-94.9 percentile   
[E*2021  
COVID-19 vaccination   
declined [Z28.21]   
06/15/2022  
Encounter Number:   
[id number]  
Encounter Status:Closed   
by ANA LOPEZ on   
24              Normal                                  [facility] FEMALE PELVIS TRANSVAGon   
2024   
   
                                                     FEMALE PELVIS   
TRANSVAG                                * * *Final Report* * *  
DATE OF EXAM: 2024   
9:46AM  
WRU 1060 - US FEMALE   
PELVIS TRANSVAG /   
ACCESSION # [id number]  
PROCEDURE REASON: Pelvic   
pain in female  
  
* * * * Physician   
Interpretation * * * *  
EXAMINATION: TRANSVAGINAL   
AND LIMITED   
TRANSABDOMINAL FEMALE   
PELVIC  
ULTRASOUND  
CLINICAL HISTORY: Pelvic   
pain. LMP: 2024  
TECHNIQUE: Sonography of   
the pelvis was performed   
by transvaginal and  
transabdominal (limited)   
techniques. Images were   
obtained and stored in  
a permanent archive.  
MQ: [facility]_  
COMPARISON: CT abdomen   
pelvis on 10/09/2021  
RESULT:  
Uterus:  
-Size: 7.8 x 2.8 x 5.6 cm  
-Orientation: Retroverted  
-Endometrial echo   
complex: Evaluation of   
the endometrium was  
adequate. No endometrial   
abnormality. The   
endometrial echo complex  
measured 0.3 cm.  
-Cervix: Unremarkable.  
-Adenomyosis assessment:   
There are no sonographic   
findings of  
adenomyosis.  
-Fibroids: There are no   
fibroids.  
Right Ovary: 2.4 x 1.6 x   
2.2 cm.  
-Normal sonographic   
appearance with   
follicles.  
Left Ovary: Not seen.  
Free Fluid: No abnormal   
free fluid is present.  
IMPRESSION:  
Left ovary not   
identified; otherwise no   
acute sonographic   
abnormalities  
identified in the pelvis.  
[facility]  
Transcribe Date/Time: 2024 10:05A  
Dictated by : KARL BARRAGAN MD  
This examination was   
interpreted and the   
report reviewed and  
electronically signed by:  
KARL BARRAGAN MD on 2024 10:08AM EST  
[id number] Normal                                  [facility]  
   
                                                    CNOVon 2024   
   
                                        CNOV                Office Visit (OBGYWM  
)  
-------------------------  
-----  
DEANNA ROWELL   
([id number]) 1 F  
Date Time Provider   
Department  
24 9:30 AM SOCORRO ZAVALA OBGYWM  
During your visit today,   
we recorded the following   
information about you:  
Blood pressure Weight   
Last Period  
120/60 64 kg 24  
Socorro Zavala APRN.CNP   
2024 9:40 AM Signed  
Chaperone offered:   
Patient declines.  
Deanna Rowell is a 18   
year old female who   
presents for problem   
visit pelvic  
pain for 2 month(s).  
HPI: sharp stabbing pain   
that is random at times,   
the pain does happen with  
intercourse. Pt denies   
any bleeding or concerns   
for STDs.  
OB History  
 T0  L0  
SAB0 IAB0 Ectopic0   
Multiple0 Live Births0  
Gyn History  
LMP: 2024   
(Approximate), Having   
periods  
Age at Menarche:  
Age at First Pregnancy:  
Age at Menopause:  
Gyn History Comments:  
Sexual Activity: Yes;   
Male  
Contraception: Pill  
PAST MEDICAL HISTORY  
Diagnosis Date  
Panic attacks  
PAST SURGICAL HISTORY  
Procedure Laterality Date  
LAPAROSCOPIC   
CHOLECYSTECTOMY   
2021  
FAMILY HISTORY  
Problem Relation Age of   
Onset  
No Known Problems Father  
No Known Problems Mother  
No Known Problems Brother  
No Known Problems   
Maternal Grandfather  
Hypertension Maternal   
Grandmother  
Stroke Maternal   
Grandmother  
Heart Attack Paternal   
Grandfather  
No Known Problems   
Paternal Grandmother  
Hypertension Other  
maternal side  
Systemic Lupus   
Erythematosus Maternal   
Aunt  
Coronary Artery Disease   
No Family History  
Hyperlipidemia No Family   
History  
Diabetes No Family   
History  
Thyroid No Family History  
Blood Disease No Family   
History  
Blood Clots No Family   
History  
Factor 5 Leiden No Family   
History  
DVT No Family History  
Rheumatologic disease No   
Family History  
Mental illness No Family   
History  
Depression No Family   
History  
Bipolar disorder No   
Family History  
Schizophrenia No Family   
History  
Alzheimer's Disease No   
Family History  
Dementia No Family   
History  
Parkinson?s Disease No   
Family History  
Tremor No Family History  
Essential Tremor No   
Family History  
Aneurysm No Family   
History  
COPD No Family History  
Kidney Disease No Family   
History  
Breast Cancer No Family   
History  
Cervical Cancer No Family   
History  
Uterine Cancer No Family   
History  
Ovarian cancer No Family   
History  
Social History  
Tobacco Use  
Smoking status: Never  
Passive exposure: Never  
Smokeless tobacco: Never  
Tobacco comments:  
VAPING-NICOTINE,DISPOSABL  
E PODS- NOTED 23-   
  
Vaping Use  
Vaping Use: current   
everyday user  
Substances: Nicotine  
Substance Use Topics  
Alcohol use: No  
Drug use: No  
Current Outpatient   
Medications  
Medication Sig  
amitriptyline (ELAVIL) 10   
mg tablet Take 1 tablet   
by mouth daily at   
bedtime.  
ondansetron (ZOFRAN) 4 mg   
tablet Take 4 mg by mouth   
every 8 hours as needed.  
Taking as needed.  
Norethin-Eth Estrad   
Triphasic (ORTHO-NOVUM   
, 28,) 0.5/0.75/1   
mg- 35 mcg  
per tablet Take 1 tablet   
by mouth once daily.  
busPIRone (BUSPAR) 7.5 mg   
tablet Take 7.5 mg by   
mouth daily at bedtime.  
gabapentin (NEURONTIN)   
100 mg capsule Take 3   
capsules by mouth two   
times a day  
for 90 days. (Patient not   
taking: Reported on   
2023)  
No current   
facility-administered   
medications for this   
visit.  
Allergies As of Date:   
2024  
Allergen Noted Reaction  
WELCHOL [COLESEVELAM]   
2022 Hives and   
Itching  
Fully Assessed 2024  
REVIEW OF SYSTEMS  
Abdomen: No bloating,   
early satiety,   
indigestion, or increased   
flatulence. No  
nausea, vomiting,   
diarrhea, or   
constipation.  
Bladder: No dysuria,   
gross hematuria, urinary   
frequency, urinary   
urgency, or  
incontinence.  
Expanded ROS: N/A  
Allergies and current   
medication updated:Yes  
EXAM: Wt 141 lb 3.2 oz   
(64.0kg)   LMP 2024  
GENERAL: pleasant,   
 female in no   
apparent distress  
HEENT: Normocephalic,   
atraumatic, mucus   
membranes moist, and no   
lesions  
CHEST: Normal inspiratory   
effort  
NEURO: alert and oriented   
x3,exam grossly non-focal  
EXTREMITIES: normal  
ASSESSMENT/PLAN:  
1. Pelvic pain in female   
- ICD9: 625.9, ICD10:   
R10.2  
- US FEMALE PELVIS   
TRANSVAG  
- if results are negative   
will consider vaginal   
cultures  
Will notify patient of   
test results.  
NAYAN Zamora.BRIAN  
Medical Decision Making:  
Problems:  
Moderate: New problem   
with uncertain prognosis  
Data:  
Unique test(s) ordered: 1  
Risk:  
Low: Low risk from   
testing/treatment  
Medical Decision Making   
Level: 3 - Low  
Allergies As of Date:   
2024 Noted Allergy   
Reaction  
WELCHOL (COLESEVELAM)   
2022 4 - Hives  
9 - Itching  
Date Reviewed: 2024  
Reviewed by: Diana Mullen LPN - Fully   
Assessed  
Reason for Visit:  
bilateral ovarian pain   
[Other]  
Primary Visit   
Diagnosis:Pelvic pain in   
female [R10.2]  
Order(s):US FEMALE PELVIS   
TRANSVAG [[id number]] Order   
#: [id number] FUTURE  
Prescriptions as of   
2024  
- busPIRone (BUSPAR) 7.5   
mg tablet  
Take 7.5 mg by mouth   
daily at bedtime.  
- amitriptyline (more   
content not included)... Normal                                  [facility]   
   
                                        CNOV                Office Visit (NEURMM  
)  
-------------------------  
-----  
DEANNA ROWELL   
([id number]) 1 F  
Date Time Provider   
Department  
23 10:30 AM   
AMALIA MARQUEZ   
NEURMM  
During your visit today,   
we recorded the following   
information about you:  
Blood pressure Last   
Period  
118/74 10/25/23  
Amalia Marquez APRN.CNP 2023 10:55   
AM Signed  
[facility]   
[facility] [place]  
Follow-up Visit  
Follow-up note  
2023  
HPI: Ms. Rowell presents   
today for a follow-up   
visit.  
Per her previous visit on   
10/13/23:  
S09.90XD Injury of head,   
subsequent encounter   
(primary encounter   
diagnosis)  
Z87.820 History of   
concussion  
M54.2 Cervical pain  
R42 Dizziness  
H53.10 Subjective visual   
disturbance  
Comment: Pt with pmh   
concussion (x8), anxiety   
presenting today for   
follow-up  
after concussion. She   
reports that roughly 3   
weeks ago she was riding   
a horse  
that bucked and threw her   
forward. She did not   
strike her head but does   
report  
that following this event   
she suffered a severe   
headache. Headache has  
persisted since that   
time. She also reports   
intermittent cervical   
pain and has  
been following with her   
chiropractor who she   
states diagnosed her with  
whiplash. Notably, she   
reports that more   
recently she had an   
episode of loss  
of peripheral vision and   
intermittent blurred   
vision as well as   
persistent  
vertigo. She also   
experienced an episode of   
L facial numbness which   
has since  
resolved. We will proceed   
with plan as follows  
-CT brain/neck to   
evaluate for   
posttraumatic changes   
given recent cervical  
strain as well as past   
history of multiple head   
injuries.  
-CTA head/neck given   
neurological deficits   
(loss of sensation,   
visual deficit,  
vertigo and recent   
chiropractic work on C   
spine) to evaluate for   
vessel changes  
such as dissection.  
-Begin gabapentin 100 mg   
BID for treatment of   
headaches, vertigo,   
cervical  
pain. Will increase to   
200mg after one week and   
again to 300mg after one   
week  
if no SE.  
-Begin naproxen 500 mg   
BID as needed at onset of   
headache for headache  
. She should not   
take more than 3   
days/week or in   
combination with  
other NSAIDs.  
-May continue Flexeril as   
needed as prescribed by   
her chiropractor for   
cervical  
pain.  
-Consult to physical   
therapy for improvement   
of cervical pain,   
vertigo, and  
relief of headaches.  
Discussed concussion as   
well as expected SE and   
methods for  
assisting/expediting the   
recovery process. Have   
encouraged her to avoid  
increased physical/mental   
exertion (horseback   
riding) until   
postconcussion  
symptoms have improved.  
Recommend continuing to   
avoid activities that may   
lead to higher   
susceptibility  
of head injury as well as   
activities that may   
exacerbate her symptoms   
(bright  
lights, loud sounds,   
irregular sleep schedule,   
etc.).She will follow-up   
in 4- 6  
weeks at which time we   
will reassess symptoms   
and determine if further  
intervention is needed.   
If experiencing red flag   
symptoms (including worst  
headache of life, focal   
weakness, return of   
sensory changes, vision   
or speech  
changes) she should   
present to the ED.  
Has not had a headache in   
three weeks. Maybe had   
one when at home and not   
felt  
it was due to head   
injury. No further vision   
changes. No dizziness. No   
facial  
numbness.  
Feels gabapentin has been   
helpful in regards to   
dizziness. Was taking one   
per  
day. No longer taking   
medication as of two   
weeks ago. Hasn't needed   
to use  
naproxen. Has not needed   
muscle relaxant.  
Did see chiro last week;   
will be going back in six   
months.  
Was able to work normal   
hours.  
Has been riding without   
difficulty. Wearing a   
helmet. Has been able to   
focus.  
Work is going ok.  
No further head injuries.  
PAST MEDICAL HISTORY  
Diagnosis Date  
Panic attacks  
PAST SURGICAL HISTORY  
Procedure Laterality Date  
LAPAROSCOPIC   
CHOLECYSTECTOMY   
2021  
Current Outpatient   
Medications on File Prior   
to Visit  
Medication Sig  
amitriptyline (ELAVIL) 10   
mg tablet Take 1 tablet   
by mouth daily at   
bedtime.  
gabapentin (NEURONTIN)   
100 mg capsule Take 3   
capsules by mouth two   
times a day  
for 90 days.  
naproxen (NAPROSYN) 500   
mg tablet Take 1 tablet   
by mouth two times a day   
as  
needed (for pain. Take   
with food.).  
ondansetron (ZOFRAN) 4 mg   
tablet Take 4 mg by mouth   
every 8 hours as needed.  
Taking as needed.  
Norethin-Eth Estrad   
Triphasic (ORTHO-NOVUM   
, 28,) 0.5/0.75/1   
mg- 35 mcg  
per tablet Take 1 tablet   
by mouth once daily.  
No current   
facility-administered   
medications on file prior   
to visit.  
Social History  
Tobacco Use  
Smoking status: Every Day  
Types: Cigarettes  
Passive exposure: Yes  
Smokeless tobacco: Never  
Tobacco comments:  
Vaping  
Vaping Use  
Vaping Use: current   
everyday user  
Substances: Nicotine  
Substance Use Topics  
Alcohol use: No  
Drug use: No  
ALLERGIES  
Allergen Reactions  
Amoxicillin Rash  
Welchol [Colesevela*   
Hives, Itching  
Review of Systems:  
ENT: denies loss of   
hearing, vertigo  
Vision: denies laurent (more   
content not included)... Normal                                  [facility]  
   
                                                    CT BRAIN WO IVCONon 10-27-20  
23   
   
                                                    CT BRAIN WO   
IVCON                                   * * *Final Report* * *  
DATE OF EXAM: Oct 27 2023   
12:08PM  
[facility] 0504 - CT BRAIN WO   
IVCON / ACCESSION #   
[id number]  
PROCEDURE REASON: Injury   
of head, subsequent   
encounter  
  
* * * * Physician   
Interpretation * * * *  
EXAMINATION: CT CERVICAL   
SPINE WO IVCON, CTA HEAD   
W IVCON, CTA NECK W  
IVCON, CT BRAIN WO IVCON  
HISTORY: Recent riding   
injury with subsequent   
headaches, peripheral  
visual field loss and   
blurred vision suggesting   
concussion.  
TECHNIQUE: Routine CT of   
the brain without IV   
contrast. Next, high  
resolution axial images   
were obtained through the   
head, neck and superior  
mediastinum following   
bolus administration of   
intravenous contrast for   
CT  
angiography. 3D maximum   
intensity projection   
images were created,  
reviewed and archived .   
MQ: CTABNPlus_4; Spiral,   
high resolution axial  
unenhanced images were   
obtained from the skull   
base to the  
cervicothoracic junction   
with sagittal and coronal   
planar  
reconstructions. MQ:   
CTCSPWO_5  
Contrast: 85 mL Omnipaque   
350 IV  
CT Radiation dose:   
Integrated Dose-Length   
Product (DLP) for this   
visit =  
415 (accession   
[id number]), 1430   
(accession [id number]),   
1430 (accession  
[id number]), 1430   
(accession [id number])   
mGy*cm.  
CT Dose Reduction   
Employed: Automated   
exposure control(AEC) and   
iterative  
recon  
COMPARISON: 2020  
RESULT:  
BRAIN:  
Acute change: No evidence   
of an acute contusion or   
other acute  
parenchymal process.   
ASPECT Score = 10  
Hemorrhage: No evidence   
of acute intracranial   
hemorrhage. ECASS  
hemorrhagic   
transformation score: Not   
Applicable  
Mass Lesion / Mass   
Effect: There is no   
evidence of an   
intracranial  
mass or extra-axial fluid   
collection. No   
significant mass effect.  
Chronic change: None   
apparent.  
Parenchyma: There is no   
significant volume loss.   
The brain parenchyma  
is otherwise within   
normal limits for age.  
Ventricles: The   
ventricles are within   
normal limits of size and  
configuration for age.  
Other: No evidence of   
prior calvarial fracture.   
Minimal mucosal  
thickening is noted in   
the visualized anterior   
ethmoid air cells. Mild  
mucosal thickening and an   
air-fluid level are noted   
in each maxillary  
antrum suggesting acute   
sinusitis in the   
appropriate clinical   
setting.  
No clear evidence of   
underlying acute facial   
bone fracture.  
NECK:  
Soft tissues: The soft   
tissue planes are   
maintained throughout. No  
evidence of a soft tissue   
mass in the neck or   
superior mediastinum. No  
significant   
lymphadenopathy is seen.  
Lung apices: The   
visualized lung apices   
are clear.  
CERVICAL SPINE:  
Counting reference:   
Craniocervical junction.   
Anatomic Variants: None.  
 (topogram) images:   
No additional findings.  
Alignment: Alignment is   
anatomic.  
Craniocervical junction:   
Craniocervical junction   
is normal.  
Osseous   
structures/fracture: No   
evidence of a lytic or   
blastic process  
in the visualized spine.   
No evidence of acute or   
chronic fracture.  
Cervical soft tissues:   
The paraspinal soft   
tissues are within normal  
limits.  
Canal and foramina:   
Cervical canal and   
foramina widely patent.  
CT ARTERIOGRAM:  
Extracranial Circulation:  
Aortic Arch: There is a   
normal branching pattern   
from the aortic arch..  
There is no significant   
stenosis in the proximal   
brachiocephalic vessels.  
Carotid Stenosis:  
Right Common: No   
significant stenosis.  
Right Internal Carotid   
Plaque: The cervical   
right ICA is within   
normal  
limits of caliber and   
configuration. No clear   
evidence of prior  
dissection.  
Right Internal Carotid   
Stenosis (% by NASCET   
Criteria): 0  
Left Common: No   
significant stenosis.  
Left Internal Carotid   
Plaque: The cervical left   
ICA is within normal  
limits of caliber and   
configuration. No clear   
evidence of prior  
dissection.  
Left Internal Carotid   
Stenosis (% by NASCET   
Criteria): 0  
Cervical Vertebral   
Arteries:  
Patency: Bilateral. Both   
cervical vertebral   
arteries also appear to   
be  
within normal limits of   
caliber and   
configuration.  
Dominance: Right  
Intracranial Circulation:  
Spot Sign Presence: Not   
Applicable  
Spot Sign Number: Not   
Applicable  
Anterior Circulation: The   
distal ICAs are patent   
and within normal  
limits of caliber and   
configuration. Proximal   
ACAs and MCAs are patent.  
A1 segments are   
codominant. Proximal ACAs   
and MCAs are within   
normal  
limits of caliber and   
configuration. No   
evidence of focal   
significant  
stenosis, intraluminal   
filling defect, anomalous   
luminal tapering,  
aneurysm, or vascular   
malformation in the   
anterior intracranial  
circulation.  
Vertebrobasilar   
Circulation: The distal   
vertebral arteries are   
patent.  
Right vertebral artery is   
dominant. The distal   
vertebral and basilar  
arteries are within   
normal limits of caliber   
and configuration. The  
proximal PICAs, AICAs,   
SCA's and PCAs are patent   
and within normal limits  
of caliber and   
configuration. The left   
PCOM is patent but   
diminutive in  
caliber. No distinct   
right PCOM is seen. No   
evidence of focal  
significant stenosis,   
abrupt vessel occlusion,   
aneurysm or vascular  
malformation in the   
posterior (more content   
not included)...    Normal                                  [facility]  
   
                                                    CT CERVICAL SPINE WO IVCONon  
 10-   
   
                                                    CT CERVICAL   
SPINE WO IVCON                          * * *Final Report* * *  
DATE OF EXAM: Oct 27 2023   
11:54AM  
[facility] 0505 - CT CERVICAL   
SPINE WO IVCON /   
ACCESSION # [id number]  
PROCEDURE REASON:   
multiple diagnoses  
  
* * * * Physician   
Interpretation * * * *  
EXAMINATION: CT CERVICAL   
SPINE WO IVCON, CTA HEAD   
W IVCON, CTA NECK W  
IVCON, CT BRAIN WO IVCON  
HISTORY: Recent riding   
injury with subsequent   
headaches, peripheral  
visual field loss and   
blurred vision suggesting   
concussion.  
TECHNIQUE: Routine CT of   
the brain without IV   
contrast. Next, high  
resolution axial images   
were obtained through the   
head, neck and superior  
mediastinum following   
bolus administration of   
intravenous contrast for   
CT  
angiography. 3D maximum   
intensity projection   
images were created,  
reviewed and archived .   
MQ: CTABNPlus_4; Spiral,   
high resolution axial  
unenhanced images were   
obtained from the skull   
base to the  
cervicothoracic junction   
with sagittal and coronal   
planar  
reconstructions. MQ:   
CTCSPWO_5  
Contrast: 85 mL Omnipaque   
350 IV  
CT Radiation dose:   
Integrated Dose-Length   
Product (DLP) for this   
visit =  
415 (accession   
[id number]), 1430   
(accession [id number]),   
1430 (accession  
[id number]), 1430   
(accession [id number])   
mGy*cm.  
CT Dose Reduction   
Employed: Automated   
exposure control(AEC) and   
iterative  
recon  
COMPARISON: 2020  
RESULT:  
BRAIN:  
Acute change: No evidence   
of an acute contusion or   
other acute  
parenchymal process.   
ASPECT Score = 10  
Hemorrhage: No evidence   
of acute intracranial   
hemorrhage. ECASS  
hemorrhagic   
transformation score: Not   
Applicable  
Mass Lesion / Mass   
Effect: There is no   
evidence of an   
intracranial  
mass or extra-axial fluid   
collection. No   
significant mass effect.  
Chronic change: None   
apparent.  
Parenchyma: There is no   
significant volume loss.   
The brain parenchyma  
is otherwise within   
normal limits for age.  
Ventricles: The   
ventricles are within   
normal limits of size and  
configuration for age.  
Other: No evidence of   
prior calvarial fracture.   
Minimal mucosal  
thickening is noted in   
the visualized anterior   
ethmoid air cells. Mild  
mucosal thickening and an   
air-fluid level are noted   
in each maxillary  
antrum suggesting acute   
sinusitis in the   
appropriate clinical   
setting.  
No clear evidence of   
underlying acute facial   
bone fracture.  
NECK:  
Soft tissues: The soft   
tissue planes are   
maintained throughout. No  
evidence of a soft tissue   
mass in the neck or   
superior mediastinum. No  
significant   
lymphadenopathy is seen.  
Lung apices: The   
visualized lung apices   
are clear.  
CERVICAL SPINE:  
Counting reference:   
Craniocervical junction.   
Anatomic Variants: None.  
 (topogram) images:   
No additional findings.  
Alignment: Alignment is   
anatomic.  
Craniocervical junction:   
Craniocervical junction   
is normal.  
Osseous   
structures/fracture: No   
evidence of a lytic or   
blastic process  
in the visualized spine.   
No evidence of acute or   
chronic fracture.  
Cervical soft tissues:   
The paraspinal soft   
tissues are within normal  
limits.  
Canal and foramina:   
Cervical canal and   
foramina widely patent.  
CT ARTERIOGRAM:  
Extracranial Circulation:  
Aortic Arch: There is a   
normal branching pattern   
from the aortic arch..  
There is no significant   
stenosis in the proximal   
brachiocephalic vessels.  
Carotid Stenosis:  
Right Common: No   
significant stenosis.  
Right Internal Carotid   
Plaque: The cervical   
right ICA is within   
normal  
limits of caliber and   
configuration. No clear   
evidence of prior  
dissection.  
Right Internal Carotid   
Stenosis (% by NASCET   
Criteria): 0  
Left Common: No   
significant stenosis.  
Left Internal Carotid   
Plaque: The cervical left   
ICA is within normal  
limits of caliber and   
configuration. No clear   
evidence of prior  
dissection.  
Left Internal Carotid   
Stenosis (% by NASCET   
Criteria): 0  
Cervical Vertebral   
Arteries:  
Patency: Bilateral. Both   
cervical vertebral   
arteries also appear to   
be  
within normal limits of   
caliber and   
configuration.  
Dominance: Right  
Intracranial Circulation:  
Spot Sign Presence: Not   
Applicable  
Spot Sign Number: Not   
Applicable  
Anterior Circulation: The   
distal ICAs are patent   
and within normal  
limits of caliber and   
configuration. Proximal   
ACAs and MCAs are patent.  
A1 segments are   
codominant. Proximal ACAs   
and MCAs are within   
normal  
limits of caliber and   
configuration. No   
evidence of focal   
significant  
stenosis, intraluminal   
filling defect, anomalous   
luminal tapering,  
aneurysm, or vascular   
malformation in the   
anterior intracranial  
circulation.  
Vertebrobasilar   
Circulation: The distal   
vertebral arteries are   
patent.  
Right vertebral artery is   
dominant. The distal   
vertebral and basilar  
arteries are within   
normal limits of caliber   
and configuration. The  
proximal PICAs, AICAs,   
SCA's and PCAs are patent   
and within normal limits  
of caliber and   
configuration. The left   
PCOM is patent but   
diminutive in  
caliber. No distinct   
right PCOM is seen. No   
evidence of focal  
significant stenosis,   
abrupt vessel occlusion,   
aneurysm or vascular  
malformation in the   
posterior intracra (more   
content not included)... Normal                                  [facility]  
   
                                                    CTA HEAD W IVCONon 10-  
3   
   
                                        CTA HEAD W IVCON    * * *Final Report* *  
 *  
DATE OF EXAM: Oct 27 2023   
12:08PM  
[facility] 0022 - CTA HEAD W   
IVCON / ACCESSION #   
[id number]  
PROCEDURE REASON:   
multiple diagnoses  
  
* * * * Physician   
Interpretation * * * *  
EXAMINATION: CT CERVICAL   
SPINE WO IVCON, CTA HEAD   
W IVCON, CTA NECK W  
IVCON, CT BRAIN WO IVCON  
HISTORY: Recent riding   
injury with subsequent   
headaches, peripheral  
visual field loss and   
blurred vision suggesting   
concussion.  
TECHNIQUE: Routine CT of   
the brain without IV   
contrast. Next, high  
resolution axial images   
were obtained through the   
head, neck and superior  
mediastinum following   
bolus administration of   
intravenous contrast for   
CT  
angiography. 3D maximum   
intensity projection   
images were created,  
reviewed and archived .   
MQ: CTABNPlus_4; Spiral,   
high resolution axial  
unenhanced images were   
obtained from the skull   
base to the  
cervicothoracic junction   
with sagittal and coronal   
planar  
reconstructions. MQ:   
CTCSPWO_5  
Contrast: 85 mL Omnipaque   
350 IV  
CT Radiation dose:   
Integrated Dose-Length   
Product (DLP) for this   
visit =  
415 (accession   
[id number]), 1430   
(accession [id number]),   
1430 (accession  
[id number]), 1430   
(accession [id number])   
mGy*cm.  
CT Dose Reduction   
Employed: Automated   
exposure control(AEC) and   
iterative  
recon  
COMPARISON: 2020  
RESULT:  
BRAIN:  
Acute change: No evidence   
of an acute contusion or   
other acute  
parenchymal process.   
ASPECT Score = 10  
Hemorrhage: No evidence   
of acute intracranial   
hemorrhage. ECASS  
hemorrhagic   
transformation score: Not   
Applicable  
Mass Lesion / Mass   
Effect: There is no   
evidence of an   
intracranial  
mass or extra-axial fluid   
collection. No   
significant mass effect.  
Chronic change: None   
apparent.  
Parenchyma: There is no   
significant volume loss.   
The brain parenchyma  
is otherwise within   
normal limits for age.  
Ventricles: The   
ventricles are within   
normal limits of size and  
configuration for age.  
Other: No evidence of   
prior calvarial fracture.   
Minimal mucosal  
thickening is noted in   
the visualized anterior   
ethmoid air cells. Mild  
mucosal thickening and an   
air-fluid level are noted   
in each maxillary  
antrum suggesting acute   
sinusitis in the   
appropriate clinical   
setting.  
No clear evidence of   
underlying acute facial   
bone fracture.  
NECK:  
Soft tissues: The soft   
tissue planes are   
maintained throughout. No  
evidence of a soft tissue   
mass in the neck or   
superior mediastinum. No  
significant   
lymphadenopathy is seen.  
Lung apices: The   
visualized lung apices   
are clear.  
CERVICAL SPINE:  
Counting reference:   
Craniocervical junction.   
Anatomic Variants: None.  
 (topogram) images:   
No additional findings.  
Alignment: Alignment is   
anatomic.  
Craniocervical junction:   
Craniocervical junction   
is normal.  
Osseous   
structures/fracture: No   
evidence of a lytic or   
blastic process  
in the visualized spine.   
No evidence of acute or   
chronic fracture.  
Cervical soft tissues:   
The paraspinal soft   
tissues are within normal  
limits.  
Canal and foramina:   
Cervical canal and   
foramina widely patent.  
CT ARTERIOGRAM:  
Extracranial Circulation:  
Aortic Arch: There is a   
normal branching pattern   
from the aortic arch..  
There is no significant   
stenosis in the proximal   
brachiocephalic vessels.  
Carotid Stenosis:  
Right Common: No   
significant stenosis.  
Right Internal Carotid   
Plaque: The cervical   
right ICA is within   
normal  
limits of caliber and   
configuration. No clear   
evidence of prior  
dissection.  
Right Internal Carotid   
Stenosis (% by NASCET   
Criteria): 0  
Left Common: No   
significant stenosis.  
Left Internal Carotid   
Plaque: The cervical left   
ICA is within normal  
limits of caliber and   
configuration. No clear   
evidence of prior  
dissection.  
Left Internal Carotid   
Stenosis (% by NASCET   
Criteria): 0  
Cervical Vertebral   
Arteries:  
Patency: Bilateral. Both   
cervical vertebral   
arteries also appear to   
be  
within normal limits of   
caliber and   
configuration.  
Dominance: Right  
Intracranial Circulation:  
Spot Sign Presence: Not   
Applicable  
Spot Sign Number: Not   
Applicable  
Anterior Circulation: The   
distal ICAs are patent   
and within normal  
limits of caliber and   
configuration. Proximal   
ACAs and MCAs are patent.  
A1 segments are   
codominant. Proximal ACAs   
and MCAs are within   
normal  
limits of caliber and   
configuration. No   
evidence of focal   
significant  
stenosis, intraluminal   
filling defect, anomalous   
luminal tapering,  
aneurysm, or vascular   
malformation in the   
anterior intracranial  
circulation.  
Vertebrobasilar   
Circulation: The distal   
vertebral arteries are   
patent.  
Right vertebral artery is   
dominant. The distal   
vertebral and basilar  
arteries are within   
normal limits of caliber   
and configuration. The  
proximal PICAs, AICAs,   
SCA's and PCAs are patent   
and within normal limits  
of caliber and   
configuration. The left   
PCOM is patent but   
diminutive in  
caliber. No distinct   
right PCOM is seen. No   
evidence of focal  
significant stenosis,   
abrupt vessel occlusion,   
aneurysm or vascular  
malformation in the   
posterior intracranial   
circu (more content not   
included)...        Normal                                  [facility]  
   
                                                    CTA NECK W IVCONon 10-  
3   
   
                                        CTA NECK W IVCON    * * *Final Report* *  
 *  
DATE OF EXAM: Oct 27 2023   
12:08PM  
[facility] 0024 - CTA NECK W   
IVCON / ACCESSION #   
[id number]  
PROCEDURE REASON: Injury   
of head, subsequent   
encounter  
  
* * * * Physician   
Interpretation * * * *  
EXAMINATION: CT CERVICAL   
SPINE WO IVCON, CTA HEAD   
W IVCON, CTA NECK W  
IVCON, CT BRAIN WO IVCON  
HISTORY: Recent riding   
injury with subsequent   
headaches, peripheral  
visual field loss and   
blurred vision suggesting   
concussion.  
TECHNIQUE: Routine CT of   
the brain without IV   
contrast. Next, high  
resolution axial images   
were obtained through the   
head, neck and superior  
mediastinum following   
bolus administration of   
intravenous contrast for   
CT  
angiography. 3D maximum   
intensity projection   
images were created,  
reviewed and archived .   
MQ: CTABNPlus_4; Spiral,   
high resolution axial  
unenhanced images were   
obtained from the skull   
base to the  
cervicothoracic junction   
with sagittal and coronal   
planar  
reconstructions. MQ:   
[id number]_5  
Contrast: 85 mL Omnipaque   
350 IV  
CT Radiation dose:   
Integrated Dose-Length   
Product (DLP) for this   
visit =  
415 (accession   
[id number]), 1430   
(accession [id number]),   
1430 (accession  
[id number]), 1430   
(accession [id number])   
mGy*cm.  
CT Dose Reduction   
Employed: Automated   
exposure control(AEC) and   
iterative  
recon  
COMPARISON: 2020  
RESULT:  
BRAIN:  
Acute change: No evidence   
of an acute contusion or   
other acute  
parenchymal process.   
ASPECT Score = 10  
Hemorrhage: No evidence   
of acute intracranial   
hemorrhage. ECASS  
hemorrhagic   
transformation score: Not   
Applicable  
Mass Lesion / Mass   
Effect: There is no   
evidence of an   
intracranial  
mass or extra-axial fluid   
collection. No   
significant mass effect.  
Chronic change: None   
apparent.  
Parenchyma: There is no   
significant volume loss.   
The brain parenchyma  
is otherwise within   
normal limits for age.  
Ventricles: The   
ventricles are within   
normal limits of size and  
configuration for age.  
Other: No evidence of   
prior calvarial fracture.   
Minimal mucosal  
thickening is noted in   
the visualized anterior   
ethmoid air cells. Mild  
mucosal thickening and an   
air-fluid level are noted   
in each maxillary  
antrum suggesting acute   
sinusitis in the   
appropriate clinical   
setting.  
No clear evidence of   
underlying acute facial   
bone fracture.  
NECK:  
Soft tissues: The soft   
tissue planes are   
maintained throughout. No  
evidence of a soft tissue   
mass in the neck or   
superior mediastinum. No  
significant   
lymphadenopathy is seen.  
Lung apices: The   
visualized lung apices   
are clear.  
CERVICAL SPINE:  
Counting reference:   
Craniocervical junction.   
Anatomic Variants: None.  
 (topogram) images:   
No additional findings.  
Alignment: Alignment is   
anatomic.  
Craniocervical junction:   
Craniocervical junction   
is normal.  
Osseous   
structures/fracture: No   
evidence of a lytic or   
blastic process  
in the visualized spine.   
No evidence of acute or   
chronic fracture.  
Cervical soft tissues:   
The paraspinal soft   
tissues are within normal  
limits.  
Canal and foramina:   
Cervical canal and   
foramina widely patent.  
CT ARTERIOGRAM:  
Extracranial Circulation:  
Aortic Arch: There is a   
normal branching pattern   
from the aortic arch..  
There is no significant   
stenosis in the proximal   
brachiocephalic vessels.  
Carotid Stenosis:  
Right Common: No   
significant stenosis.  
Right Internal Carotid   
Plaque: The cervical   
right ICA is within   
normal  
limits of caliber and   
configuration. No clear   
evidence of prior  
dissection.  
Right Internal Carotid   
Stenosis (% by NASCET   
Criteria): 0  
Left Common: No   
significant stenosis.  
Left Internal Carotid   
Plaque: The cervical left   
ICA is within normal  
limits of caliber and   
configuration. No clear   
evidence of prior  
dissection.  
Left Internal Carotid   
Stenosis (% by NASCET   
Criteria): 0  
Cervical Vertebral   
Arteries:  
Patency: Bilateral. Both   
cervical vertebral   
arteries also appear to   
be  
within normal limits of   
caliber and   
configuration.  
Dominance: Right  
Intracranial Circulation:  
Spot Sign Presence: Not   
Applicable  
Spot Sign Number: Not   
Applicable  
Anterior Circulation: The   
distal ICAs are patent   
and within normal  
limits of caliber and   
configuration. Proximal   
ACAs and MCAs are patent.  
A1 segments are   
codominant. Proximal ACAs   
and MCAs are within   
normal  
limits of caliber and   
configuration. No   
evidence of focal   
significant  
stenosis, intraluminal   
filling defect, anomalous   
luminal tapering,  
aneurysm, or vascular   
malformation in the   
anterior intracranial  
circulation.  
Vertebrobasilar   
Circulation: The distal   
vertebral arteries are   
patent.  
Right vertebral artery is   
dominant. The distal   
vertebral and basilar  
arteries are within   
normal limits of caliber   
and configuration. The  
proximal PICAs, AICAs,   
SCA's and PCAs are patent   
and within normal limits  
of caliber and   
configuration. The left   
PCOM is patent but   
diminutive in  
caliber. No distinct   
right PCOM is seen. No   
evidence of focal  
significant stenosis,   
abrupt vessel occlusion,   
aneurysm or vascular  
malformation in the   
posterior (more content   
not included)...    Normal                                  [facility]  
   
                                                    No Panel Informationon 10-27  
-2023   
   
                                                                  [facility] 10-   
   
                                        CNPN                Telephone (NEUR)  
-------------------------  
-----  
DEANNA ROWELL   
([id number]) 1 F  
Date Time Provider   
Department  
10/16/23 AMALIA MARQUEZ  
During your visit today,   
we recorded the following   
information about you:  
Regine Becerra   
10/26/2023 3:32 PM Signed  
Received anthem approval  
Ref # - [id number]  
[id number]  
[id number]  
[id number]  
Mri/CAT  
10/13/23 - 23  
Head or brain without   
contrast  
MRI/CAT  
10/13/23 - 23   
computed tomography   
cervical spine without   
contrast  
MRI/CAT  
10/13/23 - 23  
Tomographic angiography   
neck  
MRI/CAT  
10/13/23 - 23  
Tomographic angiography   
head  
Allergies As of Date:   
10/16/2023 Noted Allergy   
Reaction  
AMOXICILLIN 2023 2   
- Rash  
WELCHOL (COLESEVELAM)   
2022 4 - Hives  
9 - Itching  
Date Reviewed: 10/13/2023  
Reviewed by: Amalia Marquez APRN.CNP - Fully   
Assessed  
Reason for Visit:  
Insurance Authorization   
[[id number]] Cmt: MRI/CT  
Prescriptions as of   
10/30/2023  
- amitriptyline (ELAVIL)   
10 mg tablet  
Take 1 tablet by mouth   
daily at bedtime.  
- gabapentin (NEURONTIN)   
100 mg capsule  
Take 3 capsules by mouth   
two times a day for 90   
days.  
- naproxen (NAPROSYN) 500   
mg tablet  
Take 1 tablet by mouth   
two times a day as needed   
(for pain. Take with   
food.).  
- ondansetron (ZOFRAN) 4   
mg tablet  
Take 4 mg by mouth every   
8 hours as needed. Taking   
as needed.  
- Norethin-Eth Estrad   
Triphasic (ORTHO-NOVUM   
, 28,) 0.5/0.75/1   
mg- 35 mcg  
per tablet  
Take 1 tablet by mouth   
once daily.  
Problem List As Of Date   
10/16/2023 Noted Resolved  
Episodic tension-type   
headache, not   
intractable*10/12/2016  
Chronic left-sided low   
back pain without   
sciati*2017  
Biliary dyskinesia   
[K82.8] 2021  
Childhood overweight, BMI   
85-94.9 percentile   
[E*2021  
COVID-19 vaccination   
declined [Z28.21]   
06/15/2022  
Encounter Number:   
[id number]  
Encounter Status:Closed   
by REGINE BECERRA on   
10/30/23            Normal                                  [facility]  
   
                                                    Rebecca 10-   
   
                                        CNOV                Office Visit (NEURMM  
)  
-------------------------  
-----  
DEANNA ROWELL   
([id number]) 1 F  
Date Time Provider   
Department  
10/13/23 8:00 AM   
AMALIA MARQUEZ  
During your visit today,   
we recorded the following   
information about you:  
Pulse Blood pressure   
Weight Height  
82/minute 123/79 64.7 kg   
1.651 m  
Amalia Marquez APRN.CNP 10/13/2023 8:56   
AM Signed  
[facility]   
[facility] [place]  
New Patient Visit  
New Patient Consultation  
CHIEF COMPLAINT:   
Concussion  
Deanna Rowell is a 18   
year old accompanied by   
her mother. Consult was  
requested by SHAN Talbot CNP   
for an opinion regarding   
concussion. My final  
impression and   
recommendations will be   
communicated back to the   
requesting  
physician by way of the   
shared medical record or   
fax.  
2023  
HPI: Ms. Rowell presents   
today secondary to issues   
of concussion. She states  
that she shows horses and   
was running through a   
chute. Horse bucked and   
threw  
her forward. An hour   
after that she had a   
severe headache. Three   
weeks ago. The  
first two weeks had a   
constant migraine. Lost   
peripheral vision and had  
fuzziness in eyes one   
night. Has been trying to   
take it easy otherwise   
will  
have sharp pains in her   
head. Did not fall off   
the horse. Did not go to   
ED.  
LOC: Denies  
Headache: Posterior with   
radiation forward into   
eyes. Or temple. No   
radiation  
into neck. Worse at   
night; works 7-7. Lights   
driving home make them   
worse. Has  
tried muscle relaxant   
from chiro as well as   
Aleve. Has seen her chiro   
twice  
since this occurred. No   
XR.  
Neck pain: Intermittent;   
if riding for a while   
will have worsening of   
pain in  
neck and low back.  
Dizziness: If putting her   
head down or closes her   
eyes can feel like room   
is  
spinning.  
N/V: Past week has felt   
nauseated especially at   
night  
Memory/cognitive   
concerns: Some difficulty   
with comprehension at   
work  
Speech changes: Denies  
Vision changes   
(double/blurred): Has had   
frequent blurred vision;   
looks like  
she took her contacts out   
intermittently; worse   
when headaches worse  
Light sensitivity: Yes  
Sound sensitivity: Denies  
Emotional changes: Denies  
Sleep concerns: Denies  
Paresthesias: Numbness   
and tingling to face a   
few days following; L  
History of head injury:   
Hx of 8 concussions (four   
rico accident, horse  
accident)  
Neurological history: Hx   
of migraines in middle   
school; resolved on their   
own  
(saw neurology at )  
Family history: Denies  
Alcohol: Denies  
Tobacco: Vape  
PAST MEDICAL HISTORY  
Diagnosis Date  
Panic attacks  
PAST SURGICAL HISTORY  
Procedure Laterality Date  
LAPAROSCOPIC   
CHOLECYSTECTOMY   
2021  
Current Outpatient   
Medications on File Prior   
to Visit  
Medication Sig  
ondansetron (ZOFRAN) 4 mg   
tablet Take 4 mg by mouth   
every 8 hours as needed.  
Taking as needed.   
(Patient not taking:   
Reported on 10/5/2023)  
amitriptyline (ELAVIL) 10   
mg tablet Take 1 tablet   
by mouth daily at   
bedtime.  
busPIRone (BUSPAR) 7.5 mg   
tablet Take 1 tablet by   
mouth twice daily.  
Norethin-Eth Estrad   
Triphasic (ORTHO-NOVUM   
, ,) 0.5/0.75/1   
mg- 35 mcg  
per tablet Take 1 tablet   
by mouth once daily.  
No current   
facility-administered   
medications on file prior   
to visit.  
Social History  
Tobacco Use  
Smoking status: Every Day  
Types: Cigarettes  
Passive exposure: Yes  
Smokeless tobacco: Never  
Tobacco comments:  
Vaping  
Vaping Use  
Vaping Use: current   
everyday user  
Substances: Nicotine  
Substance Use Topics  
Alcohol use: No  
Drug use: No  
ALLERGIES  
Allergen Reactions  
Amoxicillin Rash  
Welchol [Colesevela*   
Hives, Itching  
Review of Systems:  
Constitutional: denies   
fever, weight loss, loss   
of appetite  
ENT: denies loss of   
hearing, + vertigo  
Vision: denies + blurring   
vison, double   
vision/diplopia  
Cardiopulmonary: denies   
chest pain, palpitations  
Respiratory: denies   
shortness of breath  
GI: denies recent +   
nausea, vomiting,   
diarrhea, constipation  
: denies incontinence  
Psych: denies depression,   
+ anxiety  
Sleep: denies issues with   
sleeping  
Heme: denies easy   
bruising/bleeding  
Musculoskeletal: denies   
weakness, joint ache/pain  
Back/spine: denies + low   
back or + cervical pains  
Neuro: denies tremors,   
loss of feeling, +   
dizziness, seizure,   
blackout,  
paresthesia, + facial   
paresthesia, facial   
weakness, + difficulty in   
speech,  
slurring of words,   
dysarthria, dysphagia,   
memory loss, + headache  
Physical Exam:  
10/13/23  
0752  
BP: 123/79  
BP Site: Left Arm  
BP Position: Sitting  
BP Cuff Size: Regular   
Adult  
Pulse: 82  
SpO2: 100%  
Weight: 64.7 kg (142 lb   
9.6 oz)  
Height: 165.1 cm (5' 5 )  
Patient is alert and in   
no distress. Dress is   
appropriate. Mood is   
appropriate  
Breathing appears regular   
and unstressed  
Neurologic examination:  
Cognitively intact. No   
deficits. No formal MOCA   
performed.  
CN: Pupils equal and   
reactive to light,   
extraocular movements   
intact with no  
nystagmus, mild ptosis R   
eyelid, facial sensation   
intact bilaterally to   
light  
touch. (more content not   
included)...        Normal                                  [facility]  
   
                                                    CNOVon 10-   
   
                                        CNOV                Office Visit ([facility]  
)  
-------------------------  
-----  
LELIADEANNA NOEMI   
([id number]) 1 F  
Date Time Provider   
Department  
10/11/23 2:30 PM NETO TALBOT [facility]  
During your visit today,   
we recorded the following   
information about you:  
Neto Talbot APRN.CNP   
10/11/2023 3:53 PM Signed  
Left without being seen.   
Patient had to leave to   
go to work.  
SHAYLA Gilmore Laurie, APRN.CNP   
10/11/2023 3:05 PM Signed  
5 to Go!TM  
Healthy Kids Inside AND   
Out  
5 Eat FIVE fruits and   
veggies a day  
4 Give and get FOUR   
compliments a day  
3 Consume THREE calcium   
products a day  
2 Limit media time to TWO   
hours a day  
1 Get at least ONE hour   
of exercise a day  
0 Consume ZERO   
sugar-sweetened drinks  
Go! Be healthy, inside   
and out!  
[email or web address]  
toGo  
Adolescent to Adult   
Transition Program  
[facility] cares   
about helping you and   
each of our adolescents   
and young  
adults make a smooth   
transition to adult care.  
If your current doctor is   
a pediatrician, we will   
work with you to decide   
the  
correct age for moving   
your care to a doctor or   
other provider who takes   
care  
of adults.  
We suggest that this move   
take place before age 22.  
Our office policy is to   
prepare you to move to a   
doctor or other provider   
who  
takes care of adults.  
This includes helping you   
find a doctor or other   
provider, sending medical  
records, and talking   
about any special needs   
with the new doctor or   
other  
provider.  
If your current doctor is   
in family medicine,   
[facility] will   
prepare you  
and your family for the   
transition to being an   
adult patient.  
You will be able to make   
your own healthcare   
decisions and will have   
an adult  
care team that meets your   
personal healthcare   
needs.  
At age 18, by law, we   
need your agreement to   
discuss personal health  
information with your   
family.  
We understand and respect   
that you may want to   
include your family in  
healthcare choices and   
will partner with you on   
how and when to include   
your  
family in decisions.  
We will make sure you   
know what changes to   
expect.  
We will also strive to   
make sure that all care   
team providers know your   
needs.  
We will help you find   
community resources and   
specialty care, if   
needed.  
Having your information   
before you come for the   
first time helps us be   
sure we  
do not miss any details.   
If joining our practice   
from outside [facility],  
we will help you request   
your medical record from   
past doctor(s) before   
your  
first visit. We will make   
every effort to work with   
your past providers to  
ensure a smooth   
transition and   
experience.  
We are always here for   
you. If you have any   
questions or concerns,   
please  
contact your primary care   
team or e-mail   
[email or web address]  
Got Transition ? is the   
federally funded national   
resource center on health  
care transition (HCT).   
Its aim is to improve   
transition from pediatric   
to adult  
health care through the   
use of evidence-driven   
strategies for health   
care  
professionals, youth,   
young adults, and their   
families.  
www.gottransition.org  
https://gottransition.org  
/resource/?hct-family-too  
lkit  
Allergies As of Date:   
10/11/2023 Noted Allergy   
Reaction  
AMOXICILLIN 2023 2   
- Rash  
WELCHOL (COLESEVELAM)   
2022 4 - Hives  
9 - Itching  
Date Reviewed: 10/11/2023  
Reviewed by: Neto Talbot APRN.CNP - Fully   
Assessed  
Primary Visit   
Diagnosis:Procedure not   
carried out [Z53.9]  
Prescriptions as of   
10/11/2023  
- amitriptyline (ELAVIL)   
10 mg tablet  
Take 1 tablet by mouth   
daily at bedtime.  
- busPIRone (BUSPAR) 7.5   
mg tablet  
Take 1 tablet by mouth   
twice daily.  
- Norethin-Eth Estrad   
Triphasic (ORTHO-NOVUM   
, 28,) 0.5/0.75/1   
mg- 35 mcg  
per tablet  
Take 1 tablet by mouth   
once daily.  
- ondansetron (ZOFRAN) 4   
mg tablet  
Take 4 mg by mouth every   
8 hours as needed. Taking   
as needed.  
Problem List As Of Date   
10/11/2023 Noted Resolved  
Episodic tension-type   
headache, not   
intractable*10/12/2016  
Chronic left-sided low   
back pain without   
sciati*2017  
Biliary dyskinesia   
[K82.8] 2021  
Childhood overweight, BMI   
85-94.9 percentile   
[E*2021  
COVID-19 vaccination   
declined [Z28.21]   
06/15/2022  
Other instructions from   
your clinician:  
5 to Go!TM  
Healthy Kids Inside AND   
Out  
5 Eat FIVE fruits and   
veggies a day  
4 Give and get FOUR   
compliments a day  
3 Consume THREE calcium   
products a day  
2 Limit media time to TWO   
hours a day  
1 Get at least ONE hour   
of exercise a day  
0 Consume ZERO   
sugar-sweetened drinks  
Go! Be healthy, inside   
and out!  
[email or web address]  
toGo  
Adolescent to Adult   
Transition Program  
[facility] cares   
about helping you and   
each of our adolescents   
and  
young adults make a   
smooth transition to   
adult care.  
If your current doctor is   
a pediatrician, we will   
work with you to decide  
the correct age for   
moving your care to a   
doctor or other provider   
who  
takes care of adults.  
We suggest that this move   
take place before age 22.  
Our (more content not   
included)...        Normal                                  [facility]  
   
                                                    CNOVon 10-   
   
                                        CNOV                Office Visit ([facility]  
)  
-------------------------  
-----  
DEANNA ROWELL   
([id number]) 1 F  
Date Time Provider   
Department  
10[facility]23 11[facility]30 [facility]  
During your visit today,   
we recorded the following   
information about you:  
Temperature Pulse   
Respiration Blood   
pressure  
98.9 degrees 89/minute   
19/minute 108/72  
Weight Last Period  
65.3 kg 23  
Neto Talbot APRN.CNP   
10/6/2023 7:41 AM Signed  
PEDIATRIC SICK VISIT  
SUBJECTIVE:  
Deanna Rowell is a 18   
year old accompanied  
Patient presents with:  
Headache: Onset x 2 weeks   
ago, horse bucked and she   
jerked forward. Saw  
Chiropractor Tried ice   
and OTC Meds   
(Tylenol/Motrin). Had   
blurred vision/lost  
peripheral vision. Had   
pins and needle feeling   
in left side of face  
History was obtained   
from: patient  
Has history of headaches,   
used to see neurology.  
Also has history of   
concussions- 2020,   
3/2022  
On  was in an   
equestrian show and horse   
bucked, she did not fall   
off but  
head did jerk back and   
forward and side to side.  
Since then she has had a   
headache  
Last Friday lost   
peripheral vision while   
driving at night  
Did have emesis that   
night-none since that   
time.  
Seeing   
chiropractor-thinks   
whiplash  
No other nausea or   
vomiting  
Did rest last   
Saturday-but did not   
resolve headache.  
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  
Does not work today or   
tomorrow  
Works 8-2 Sat and Sun  
Vision ok today  
Tried motrin and ice  
HISTORY:  
ACTIVE PROBLEM LIST  
Episodic Tension-Type   
Headache, Not Intractable  
Chronic Left-Sided Low   
Back Pain Without   
Sciatica  
Biliary Dyskinesia  
Covid-19 Vaccination   
Declined  
PAST MEDICAL HISTORY  
Diagnosis Date  
Panic attacks  
PAST SURGICAL HISTORY  
Procedure Laterality Date  
LAPAROSCOPIC   
CHOLECYSTECTOMY   
2021  
Allergies:  
ALLERGIES  
Allergen Reactions  
Amoxicillin Rash  
Welchol [Colesevela*   
Hives, Itching  
Medications:  
amitriptyline (ELAVIL) 10   
mg tablet Take 1 tablet   
by mouth daily at   
bedtime.  
busPIRone (BUSPAR) 7.5 mg   
tablet Take 1 tablet by   
mouth twice daily.  
ondansetron (ZOFRAN) 4 mg   
tablet Take 4 mg by mouth   
every 8 hours as needed.  
Taking as needed.   
(Patient not taking:   
Reported on 10/5/2023)  
Norethin-Eth Estrad   
Triphasic (ORTHO-NOVUM   
, 28,) 0.5/0.75/1   
mg- 35 mcg  
per tablet Take 1 tablet   
by mouth once daily.  
OBJECTIVE:  
/72   Pulse 89     
Temp 37.2 ?C (98.9 ?F)   
(Temporal)   Resp 19   Wt  
65.3 kg (143 lb 14.4 oz)   
  LMP 2023   
(Approximate)   SpO2 98%   
  BMI  
23.95 kg/m?  
General: alert and active   
in no apparent distress  
Eyes: conjunctiva clear,   
PERRL, EOMI  
Ears: TMs translucent   
bilaterally, normal   
landmarks noted  
Nose: no rhinorrhea, no   
mucosal edema  
OP: no lesions, no   
erythema  
Neck: supple, no   
adenopathy  
Lungs: clear to   
auscultation bilaterally,   
good air exchange, no   
retractions  
CVS: Normal rate, regular   
rhythm, no murmur  
Abdomen: soft,   
nondistended, nontender,   
and no hepatosplenomegaly   
or masses  
Skin: No rashes, lesions   
or skin changes  
Neuro: No focal deficits   
or abnormal findings   
present  
ASSESSMENT/PLAN:  
Encounter Diagnosis  
ICD-10-CM  
1. History of concussion   
Z87.820 CONSULT TO [facility]   
NEUROLOGY  
-rest-no activity or   
devices today or tomorrow  
-discussed concerning   
symptoms with patient and   
when to go to ER  
-follow up in one week  
-see neurology  
SHAYLA Gilmore Laurie, APRN.CNP   
10/5/2023 11:41 AM Signed  
5 to Go!TM  
Healthy Kids Inside AND   
Out  
5 Eat FIVE fruits and   
veggies a day  
4 Give and get FOUR   
compliments a day  
3 Consume THREE calcium   
products a day  
2 Limit media time to TWO   
hours a day  
1 Get at least ONE hour   
of exercise a day  
0 Consume ZERO   
sugar-sweetened drinks  
Go! Be healthy, inside   
and out!  
[email or web address]  
toGo  
Allergies As of Date:   
10/05/2023 Noted Allergy   
Reaction  
AMOXICILLIN 2023 2   
- Rash  
WELCHOL (COLESEVELAM)   
2022 4 - Hives  
9 - Itching  
Date Reviewed: 10/05/2023  
Reviewed by: Chance Robertson - Fully Assessed  
Reason for Visit:  
Headache [52] Cmt: Onset   
x 2 weeks ago, horse   
bucked and she jerked   
forward.  
Saw Chiropractor Tried   
ice and OTC Meds   
(Tylenol/Motrin).  
Had blurred vision/lost   
peripheral vision. Had   
pins and  
needle feeling in left   
side of face  
Primary Visit   
Diagnosis:History of   
concussion [Z87.820]  
Order(s):CONSULT TO [facility]   
NEUROLOGY [9029] Order #:   
[id number]: 1 FUTURE  
Prescriptions as of   
10/06/2023  
- ondansetron (ZOFRAN) 4   
mg tablet  
Take 4 mg by mouth every   
8 hours as needed. Taking   
as needed.  
- amitriptyline (ELAVIL)   
10 mg tablet  
Take 1 tablet by mouth   
daily at bedtime.  
- busPIRone (BUSPAR) 7.5   
mg tablet  
Take 1 tablet by mouth   
twice daily.  
- Norethin-Eth Estrad   
Triphasic (ORTHO-NOVUM   
, 28,) 0.5/0.75/1   
mg- 35 mcg  
per tablet  
Take 1 tablet by mouth   
once daily.  
Problem List As Of Date   
10/05/2023 Noted Resolved  
Episodic tension-type   
headache, not   
intractable*10/12/2016  
Chronic left-sided low   
back pain without   
sciati*2017  
Biliary (more content not   
included)...        Normal                                  [facility]  
   
                                                    Established Visit (Orthopaed  
ic Surgery)on 2023   
   
                                                    Established   
Visit   
(Orthopaedic   
Surgery)        No report was sent Normal                           [facility]  
   
                                                    Established Visit (Orthopaed  
ic Surgery)on 2023   
   
                                                    Established   
Visit   
(Orthopaedic   
Surgery)                                Diagnoses/Problems  
Assessed  
Crushing injury of right   
little finger, initial   
encounter (927.3)   
(S67.196A)  
Orders  
Crushing injury of right   
little finger, initial   
encounter  
Occupational Therapy -   
General Referral (Upper   
Extremity) Evaluation and   
Treatment  
Evaluate AND Treat   
Status: Hold For -   
Scheduling Requested for:   
2023  
Patient   
Discussion/Summary  
We discussed routine use   
of the diclofenac gel as   
patient is unable to   
tolerate oral NSAIDs.   
P.o. Tylenol on as needed   
basis. Continue to rest   
and ice. I did advise the   
patient to begin gentle   
range of motion in warm   
to hot Epsom salt soaks   
daily. I am ordering   
initiation of OT to begin   
range of motion. We will   
follow up in 3 to 4 weeks   
to reassess motor and   
sensory function of the   
finger, sooner for   
changes or concerns. No   
repeat imaging   
anticipated. 1 Work note   
updated to work as splint   
allows. I did instruct   
patient to use the oval 8   
splint to hold finger in   
extension, may remove a   
couple times daily for   
gentle range of motion as   
tolerated and hand   
hygiene.  
This note was generated   
using Dragon software. It   
may contain errors in   
wording, punctuation or   
spelling.  
  
1 Amended By: Meenu Ng; Aug 01 2023 9:31   
AM ESTProvider   
Impressions  
2-week follow-up crushing   
injury of right pinky   
finger, middle phalanx  
  
Chief Complaint  
Patient is been seen   
today for: F/U RIGHT HAND   
PINKY FINGER CRUSHED IN A   
DOOR  
Onset: 7/15/23  
Date of injury: 7/15/23  
Date of surgery: NA  
Improved: NO, FINGER WILL   
SWELL ANYTIME IT IS   
UNWRAPPED.  
Pain: 0/10  
Pain Meds taken: VALTERON   
GEL  
Ice/Heat applied: NO  
Brace worn: SPLINT WITH   
ACE WRAP  
Last Xray: TODAY (23)  
Referred by:  ER  
Accompanied by: Cousin   
Kamilla, patient gives   
verbal permission to   
discuss her medical   
condition in front of   
her.  
  
History of Present   
Illness  
Deanna is a pleasant   
17-year-old female   
presenting today for   
2-week follow-up of right   
pinky crush injury.  
wearing wrap (short   
aluminum foam splint with   
Coban wrap) all the time,   
went swimming without   
wrap yesterday and   
swelling after.  
No improvement in   
sensation  
Unable to take NSAIDs   
stomach ulcers.  
  
Review of Systems  
Constitutional: no fever,   
no chills and not feeling   
tired.  
ENT: no recent cough or   
URI sx, no nosebleeds.  
Cardiovascular: no chest   
pain.  
Respiratory: no shortness   
of breath and no cough.  
Gastrointestinal: no   
abdominal pain, no   
nausea, no vomiting and   
no diarrhea.  
Integumentary: no rashes   
or skin wounds.  
Neurological: no   
headache.  
Psychiatric: no   
depression and no sleep   
disturbances.  
Endocrine: no muscle   
weakness and no muscle   
cramps.  
Hematologic/Lymphatic: no   
swollen glands and no   
tendency for easy   
bruising.  
All other systems have   
been reviewed and are   
negative other than noted   
in HPI.  
  
Active Problems  
Problems  
Crushing injury of right   
little finger, initial   
encounter (927.3)   
(S67.196A)  
Allergies  
Medication  
amoxicillin  
Recorded By: Celia Bernal; 2023 3:05:33   
PM  
Current Meds  
  
Medication   
NameInstruction  
Amitriptyline HCl - 10 MG   
Oral Tablet  
busPIRone HCl - 7.5 MG   
Oral Tablet  
Dasetta    
0.5/0.75/1-35 MG-MCG Oral   
Tablet  
Vitals  
Vital Signs  
Recorded: [id number]   
08:42AM  
Jcmqmdntkwy07.4 F  
Physical Exam  
Right Fifth Digit/Hand:   
Appearance: Normal   
ecchymosis at the 5th   
phalanx, swelling at the   
5th phalanx and Healed   
abrasion to palmar aspect   
of middle phalanx, but no   
rotational malalignment.   
Tenderness: 5th phalanx   
(Tender over site of   
impact, no bony) (5th,   
middle phalanx and DIP ).   
DIP flexion: painful   
restricted AROM 20   
degrees. Motor:   
Difficulty with two-point   
discrimination to distal   
phalanx on palmar aspect,   
cap refill at 2 seconds.   
Special Tests: Limited   
flexion at DIP joint.  
  
Results/Data  
Independent review of   
hand x-ray during today's   
visit compared to   
previous imaging. There   
is no acute fracture or   
misalignment noted. No   
noted soft tissue   
swelling. Await radiology   
report. Xray Hand Min 3   
[id number]   
08:39AMNMeenu flores  
Test   
NameResultFlagReference  
Xray Hand Min 3 View  
Please click on the link   
to view the study images  
Signatures  
Electronically signed by   
: NAYAN Ibarra-CNP; Aug 1 2023   
9:31AM EST (Author) Normal                                   [facility]  
   
                                                    HAND MIN 3 VIEWSon   
3   
   
                                        HAND MIN 3 VIEWS    MRN: [id number]  
Patient Name: DEANNA ROWELL  
  
STUDY:  
HAND MIN 3 VIEWS; Right;   
2023 8:39 am  
  
INDICATION:  
pain, s/p crush inj   
S67.196A: Crushing injury   
of right little  
finger, initial   
encounter.  
  
COMPARISON:  
2023  
  
ACCESSION NUMBER(S):  
[id number]  
  
ORDERING CLINICIAN:  
MEENU NG  
  
FINDINGS:  
PA, oblique and lateral   
views were obtained. No   
fracture or  
dislocation is noted.   
Joint spaces appear   
intact.  
  
IMPRESSION:  
No acute osseous   
abnormality  
  
  
MACRO:  
None  
Electronically signed by:   
ZARI LUIS MD,   
TRICIA                 Located within [facility]  
   
                                                    Radiologyon 2023   
   
                                        XR Hand 3 Views     Please click on the   
link   
to view the study images Normal                                  [facility]   
Orthopedics   
and Sports   
Medicine [address]  
Work Phone:   
[phone]  
3  
   
                      XR Hand 3 Views            Normal                [facility]   
Orthopedics   
and Sports   
Medicine [address]  
Work Phone:   
[phone]  
3  
   
                                                    HCG QUAL UR B/Oon 2023  
   
   
                      Pregnancy status Negative              neg - pos  [facility]  
d   
[facility]  
   
                      Quality Check Yes                              [facility]  
   
                                                    UA DIP, URINE (POC)on 2023   
   
                                                    BILIRUBIN UA   
(POCT)          Negative                        Negative        [facility]  
   
                                                    CLARITY UA   
(POCT)          Clear                                           [facility]  
   
                      COLOR UA (POCT) Dark yellow                       [facility]  
[facility]  
   
                                                    GLUCOSE UA   
(POCT)          Negative                        Negative mg/dL  [facility]  
   
                                                    HEMOGLOBIN/BLOOD   
UA (POCT)       Trace-intact    Abnormal        Negative        [facility]  
   
                      KETONE UA (POCT) Negative              Negative mg/dL [facility]  
[facility]  
   
                                                    LEUKOCYTES UA   
(POCT)          Trace           Abnormal        Negative        [facility]  
   
                                                    NITRITE UA   
(POCT)          Negative                        Negative        [facility]  
   
                      PH UA (POCT) 6.0                   4.5 - 8.0  [facility]  
   
                      Protein Ql (U) 30 mg/dL   Abnormal   Negative mg/dL [facility]  
and   
Cl[facility]  
   
                                                    SPECIFIC GRAVITY   
UA (POCT)       1.025                           1.005 - 1.030   [facility]  
   
                                                    UROBILINOGEN UA   
(POCT)          0.2 E.U./dL                     Normal E.U./dL  [facility]  
   
                                                    Initial Visit (Orthopaedic S  
urgery)on 2023   
   
                                                    Initial Visit   
(Orthopaedic   
Surgery)                                Diagnoses/Problems  
Assessed  
Crushing injury of right   
little finger, initial   
encounter (927.3)   
(S67.196A)  
Orders  
Crushing injury of right   
little finger, initial   
encounter  
Xray Hand Min 3 View;   
Status:Hold For -   
Scheduling; Requested   
for:[id number];  
Laterality : Right  
Radiologist to Determine   
Optimal Study : Y  
What are the patient's   
signs and symptoms? :   
pain, s/p crush inj  
Patient   
Discussion/Summary  
We reviewed use of   
Tylenol on a as needed   
basis. Patient was given   
an oval 8 brace to avoid   
flexion of the DIP, she   
may use this with a light   
activity. Patient was   
also given a short finger   
splint to wear with work   
and heavier activities to   
avoid bumping distal   
portion and bending the   
finger. [employer] 14 was   
updated today with no use   
of the right hand, wear   
splint, frequent   
elevation and ice, OTC   
diclofenac gel 1% 3-4   
times daily for swelling,   
pain, Tylenol as needed.   
Plan will be to follow-up   
here in 2 weeks with   
repeat imaging, sooner   
for changes or concerns.   
Patient in agreement with   
plan of care.  
This note was generated   
using Dragon software. It   
may contain errors in   
wording, punctuation or   
spelling.  
  
Provider Impressions  
Status post crush injury   
of right little finger  
  
Chief Complaint  
Patient is been seen   
today for: RIGHT HAND   
PINKY FINGER CRUSHED IN A   
DOOR  
Onset: 7/15/23  
Date of injury: 7/15/23  
Date of surgery: NA  
Improved: NO  
Pain: 3/10  
Pain Meds taken: NOTHING  
Ice/Heat applied: ICE  
Brace worn: ACE WRAPS  
Last Xray: 23  
Referred by:  ER  
  
  
History of Present   
Illness  
Agree with CC as noted.   
Deanna is a pleasant   
17-year-old female   
presenting today for   
evaluation of crushing   
injury of right pinky   
finger. Patient states   
she was at work on   
7/15/2023 when her finger   
was caught in a door jam   
near a hinge and the door   
shut. Patient states she   
had pain, swelling and   
discoloration. Sought ED   
evaluation. Patient has   
been wearing an aluminum   
finger splint that   
extends into the palm of   
the hand. Patient is not   
taking any OTC   
medications for symptom   
control, ice makes it   
worse. Patient states she   
has some numb tingling   
through the finger.   
Patient works full-time   
as a [employer]. Patient is   
unable to take NSAIDs due   
to history of ulcer in   
the past. Patient is   
right-hand dominant, no   
prior injuries to this   
hand or finger.  
  
Review of Systems  
Constitutional: no fever,   
no chills and not feeling   
tired.  
ENT: no recent cough or   
URI sx, no nosebleeds.  
Cardiovascular: no chest   
pain.  
Respiratory: no shortness   
of breath and no cough.  
Gastrointestinal: no   
abdominal pain, no   
nausea, no vomiting and   
no diarrhea.  
Integumentary: no rashes   
or skin wounds.  
Neurological: no   
headache.  
Psychiatric: no   
depression and no sleep   
disturbances.  
Endocrine: no muscle   
weakness and no muscle   
cramps.  
Hematologic/Lymphatic: no   
swollen glands and no   
tendency for easy   
bruising.  
All other systems have   
been reviewed and are   
negative other than noted   
in HPI.  
  
Allergies  
amoxicillin  
Recorded By: Celia Bernal; 2023 3:05:33   
PM  
Current Meds  
  
Medication   
NameInstruction  
Amitriptyline HCl - 10 MG   
Oral Tablet  
busPIRone HCl - 7.5 MG   
Oral Tablet  
Cephalexin 500 MG Oral   
Capsule  
Dasetta    
0.5/0.75/1-35 MG-MCG Oral   
Tablet  
Vitals  
Vital Signs  
Recorded: [id number]   
03:08PM  
Rwudvovwnip81.7 F  
Ttkwqo363 lb  
2-20 Weight [id number]   
%  
Physical Exam  
Right Fifth Digit/Hand:   
Appearance: ecchymosis at   
the 5th phalanx,   
ecchymosis at the 5th   
DIP, swelling at the 5th   
phalanx and swelling at   
the 5th DIP, but no   
rotational malalignment.   
Tenderness: 5th phalanx   
(5th, middle phalanx and   
DIP ). PIP flexion:   
painful restricted AROM   
40 degrees. DIP flexion:   
painful restricted AROM   
20 degrees. Motor: distal   
motor and sensory intact,   
cap refill at 2 seconds.   
Special Tests: flexor and   
extensor tendon function   
intact, limitied   
secondary to pain with   
ROM.  
Constitutional: General   
appearance, normal. No   
acute distress noted.  
Musculoskeletal : Gait   
and station normal.   
Digits and nails normal.   
Muscle strength normal   
upper and lower extremity   
compartments within   
normal limits.  
Cardiovascular: Pulses   
WNL. Examination of   
extremities for edema or   
varicosities WNL.  
Respiratory: No acute   
distress, no breathing   
difficulties.  
Skin: Skin and subq   
tissue WNL  
Neurologic: Reflexes WNL.  
Psychiatric: Oriented to   
person, place and time.   
Mood and affect within   
normal limits.  
Specific joint assessment   
for further detail  
  
Results/Data  
Independent review of   
hand x-rays and ED note   
completed during today's   
visit. There are no   
obvious fractures or   
misalignment noted Xray   
Hand Min 3 [id number]   
07:28AMNon Ambulatory,   
Provider  
Ordering Provider:   
АЛЕКСАНДР MATT [id number]  
Test   
NameResultFlagReference  
Xray Hand Min 3   
View(Report)  
FINAL REPORT Interpreted   
by: RAFAEL GLASER W C, MD, PHD 23 09:11   
STUDY: Hand Radiographs;   
2022 at 7:32 AN   
INDICATION: Injury to   
little finger, pain in   
right fingers.   
COMPARISON: None   
Available. ACCESSION   
NUMBER(S): [id number]   
ORDERING CLINICI (more   
content not included)... Normal                                   [facility]  
   
                                                    HAND MIN 3 VIEWSon   
3   
   
                                        HAND MIN 3 VIEWS    STUDY:  
Hand Radiographs;   
2022 at 7:32 AN  
  
INDICATION:  
Injury to little finger,   
pain in right fingers.  
  
COMPARISON:  
None Available.  
  
ACCESSION NUMBER(S):  
[id number]  
  
ORDERING CLINICIAN:  
АЛЕКСАНДР MATT DO  
  
TECHNIQUE: Three view(s)   
of the right hand.  
  
FINDINGS:  
There is no displaced   
fracture. The alignment   
is anatomic. No soft  
tissue abnormality is   
seen.  
  
IMPRESSION:  
No evidence for acute   
injury.  
  
  
Signed by Rafael Glaser MD  
Electronically signed by:   
RAFAEL GLASER MD,   
PHD                 Located within [facility]  
   
                                                    Provider Note - ED v3on    
   
                                                    Provider Note -   
ED v3                                   Provider Note:  
Results/Vital Signs:  
Pediatric Clinical   
Scoring (VITALIY)  
is no recent VITALIY charted   
on this account  
  
Chart Review:  
  
ED NOTES  
ED NOTES:  
Source of Information:   
Patient. EMR was reviewed   
for previous records.  
-------------------------  
-----  
-------------------------  
-------------------------  
-----------  
HPI: Injury to right   
little finger. This 17   
white female states   
yesterday she  
was at work when she had   
a crush injury to her   
right little finger after   
the  
hinge portion of the door   
she states that she was   
distracted when she was  
talking to someone and   
the door closed on her   
finger she states that   
since the  
injury she has had   
numbness and tingling   
from her PIP joint   
distally and  
involving the entire   
digit she noticed a small   
superficial laceration on   
the  
palmar surface of her   
right hand she notes that   
she has difficulty with  
complete extension of the   
digit. She denies any   
other injuries. She is  
right-hand dominant.   
States that movement or   
palpating her finger   
makes her  
discomfort worse rest   
helps to some extent.  
-------------------------  
-----  
-------------------------  
-------------------------  
-----------  
  
PMH: Anxiety, depression,   
UTI, GI issues  
PSH: Cholecystectomy  
Social Hx: The patient   
denies any use of alcohol   
or illicit drugs. Patient  
vapes nicotine.  
Fam:  
MEDS: Keflex, buspirone,   
amitriptyline, birth   
control pill  
ALLERGIES: Penicillin,   
WelChol  
-------------------------  
-----  
-------------------------  
-------------------------  
-----------  
PHYSICAL EXAM:  
General: Patient alert,   
awake, oriented X3,   
appears to be in no   
obvious  
distress, nontoxic,   
cooperative  
Skin: Warm. Dry. Intact.   
No rash.  
Eyes: PEARTLA, EOMIs   
intact, sclera white,   
conjunctiva clear  
HEENT: Atraumatic.   
Normo-cephalic. Oral and   
nasal mucosa pink and   
moist.  
Neck: Supple without   
meningismus, no   
lymphadenopathy.  
CV: Regular rate and   
rhythm without murmurs,   
heaves, lifts or thrills.  
Respiratory: Nonlabored   
breathing. There are no   
retractions or tachypnea.  
Lungs are clear to   
auscultation bilaterally.  
GI: Soft, nontender,   
without gross distention,   
bowel sounds present in   
all 4  
quadrants. There is no   
pulsatile masses. There   
is no CVA tenderness. No  
rebound, rigidity or   
guarding.  
MUSC: Evaluation of the   
right upper extremity   
reveal distal pulses are   
+2/4 and  
present at the radial   
ulnar aspect of the   
wrist. Patient right   
little finger  
is held in flexion at the   
PIP joint and patient has   
difficulty with complete  
extension. There is   
bruising over the dorsal   
aspect primarily at the   
DIP  
joint. There is a small   
superficial laceration   
0.2 m in length on the   
palmar  
surface of the distal   
phalanx which does not   
require any suturing   
there is no  
active bleeding no   
evidence of secondary   
infection. Patient is   
able to flex  
for the most part and has   
a lot of tenderness with   
palpation at the DIP   
joint.  
Patient has decreased   
light touch sensation   
from the DIP joint   
distally.  
Neuro: Cranial nerves II   
- XII grossly intact. No   
focal neurologic deficits  
are noted on exam.  
Lower extremities: There   
is no peripheral edema   
bilaterally, negative   
Homans  
sign. No palpable cords.   
Distal pulses are +2/4   
and present in both lower  
extremities.  
Psych: Maintains eye   
contact. Cooperative.  
-------------------------  
-----  
-------------------------  
-------------------------  
-----------  
ED course: Patient was   
seen and evaluated due to   
complaint of a crush   
injury to  
her right little finger   
patient has complaints of   
paresthesias from the PIP  
joint distally also has   
difficulty with extending   
her lower digit   
completely.  
Patient has a superficial   
laceration palmar surface   
of the same digit.   
Patient  
had three-view x-rays of   
the right hand performed   
this was negative for   
acute  
abnormality. Patient was   
treated with a padded   
aluminum splint and   
referred to  
Ortho for follow-up due   
to her difficulty with   
extension. Otherwise I   
feel she  
has a neuropraxia that   
explains her numbness and   
tingling of the digit.   
The  
patient was discharged   
home in stable   
satisfactory condition.  
  
This chart was dictated   
with the use of Dragon   
software within the   
framework of  
the current electronic   
medical records software.   
Attempts were made to   
edit in  
real time, given time   
constraints there is the   
potential for   
inaccuracies in my  
dictation.  
  
Александр Matt, DO  
  
HISTORY OF PRESENTING   
ILLNESS  
DEANNA is a 17 year old   
Female and was seen by me   
at 2023 06:56 for   
a  
chief complaint of finger   
pain/injury (pt comes in   
for an injury to the   
right  
pinky finger. Pt states   
she was at work yesterday   
and was going to the   
where  
they keep the dirty   
linen. I resident came up   
behind her an she was   
holding the  
door open with her hand   
between the door and   
hinge. Pt states the   
(more content not   
included)...        Normal                                  [facility]  
   
                                                    Radiologyon 2023   
   
                      XR Hand 3 Views            Normal                [facility]   
Orthopedics   
and Sports   
Medicine [address]  
Work Phone:   
[phone]  
3  
   
                                                    Risk Screen - Adult Emergenc  
yon 2023   
   
                                                    Risk Screen -   
Adult Emergency                         Preferred Language:  
Preferred Language:  
Preferred Language for   
Discussing Health Care   
(patient/designee)English  
  
Patient Preferred   
Pharmacy:  
Patient Preferred   
Pharmacy Statement:  
I have reviewed and   
updated the patient's   
preferred pharmacy   
selection for  
today's visit.  
  
Advanced Directives:  
Advance Directive/DNRno  
  
Family Violence Adult:  
Abuse Screen:  
Are you or have you been   
threatened or abused   
physically, emotionally,   
or  
sexually by anyoneno  
  
Learning Assessment   
(Patient):  
Learning Assessment   
(Patient):  
Patient is Able to be   
Assessed for Learningyes  
Factors Influencing   
Readiness to Learnpain  
Factors that Impact   
Ability to Learnnone  
Devices/Methods Used to   
Communicatenone  
Learning Preferencesaudio  
Cultural   
Considerationsnone  
Developmental   
Considerationsnone  
Quaker   
Considerationsnone  
  
Learning Assessment   
(Other Learner):  
Learning Assessment   
(Other Learner):  
Other learner availableno  
  
Pressure   
Injury/TB/Substance:  
Pressure Injury:  
Pressure Injury Present   
on Admissionno  
Do you have a coughno  
Smoking Statuslight user   
(uses <10 cig/day, OR   
<0.5 ppd, OR 1 can/pouch   
loose  
leaf tobacco per week, OR   
<0.5 vape pods per day)   
(1)  
Tobacco Cessation   
Education (provide if   
tobacco use within the   
last 12 mos)  
patient declined  
Alcohol Usedenies(1)  
Drug Usedenies (1)  
  
  
Admission Risk Screen:  
Significant   
IndicatorsComplete  
  
CAGE:  
CAGE:  
Is this an injured   
patient at a Trauma   
Center   
([facility]/  
[place]): no  
  
  
Electronic Signatures:  
Jorge Mathew (RN)   
(Signed 2023   
07:45)  
Authored: Preferred   
Language, Patient   
Preferred Pharmacy,   
Advanced Directives,  
Family Violence Adult,   
Learning Assessment   
(Patient), Learning   
Assessment  
(Other Learner), Pressure   
Injury/TB/Substance,   
Pressure Injury, CAGE  
  
  
Last Updated: 2023   
07:45 by Jorge Mathew   
(DORCAS)  
  
References:  
1. Data Referenced From   
 Provider Note - ED v3    
2023 07:20   Located within [facility]  
   
                                                    Triage - EDon 2023   
   
                                        Triage - ED         Quick Triage:  
Are You Pregnantno  
Have You Given Birth In   
The Last 6 Weeksno  
Are You Currently   
Breastfeedingno  
The patient and/or   
guardian verbally   
acknowledges placement   
for services into  
the following (when   
Urgent Care Service hours   
are operating):emergency  
department  
  
Chart Review:  
ARRIVAL INFORMATION  
  
Mode of Arrival: private   
vehicle  
  
  
  
CHIEF COMPLAINT  
  
DEANNA ROWELL is a   
Female patient with a   
chief complaint of finger   
pain/injury  
(pt comes in for an   
injury to the right pinky   
finger. Pt states she was   
at work  
yesterday and was going   
to the where they keep   
the dirty linen. I   
resident came  
up behind her an she was   
holding the door open   
with her hand between the   
door  
and hinge. Pt states the   
automatic door closed   
catching her finger   
between the  
two. Pt states that the   
finger is numb and now   
tingling.).  
Onset of the Complaint:   
15-Jul-2023  
Triage Date/Time:   
2023 06:53  
MARGARET: 4  
Pain Rating (0-10): 2 =   
Mild  
Vital Signs:  
Temperature: 98.1F (   
36.7C) taken temporal  
Blood Pressure: 131/90   
Mean:  
Heart Rate: 102  
Respiratory Rate: 18  
Pulse Oximetry: 100%   
Height: 5 feet 5.00   
inches. 165.1 CM  
Weight: 140.2 pounds.   
Calculated 63.6 kg.   
(stated)  
Calculated BMI (kg/m2):   
23.332 Calculated BSA   
(m2) 1.71  
  
[place] Coma Scale:  
Best Eye Response: (E4)   
spontaneous  
Best Motor Response: (M6)   
obeys commands  
Best Verbal Response:   
(V5) oriented  
Waqas Score: 15  
  
  
Mask applied: no  
Last menstrual period:   
2023  
Patient has homicidal   
thoughts: no  
  
  
Symptom Notes:  
.  
  
Symptoms Are POSITIVE   
For:  
numbness, pain (describe)   
and tingling.  
Symptoms Are Negative   
For:  
abrasion, bleeding,   
bruising, deformity,   
difficulty bending,   
difficulty walking  
and decreased ROM.  
  
  
Risk Screens  
Suicide Risk Screen  
  
In the Past Month: Have   
you wished you were dead   
or wished you could go to  
sleep and not wake up no  
In the Past Month: Have   
you had any actual   
thoughts of killing   
yourself no  
In Your Lifetime: Have   
you ever done anything,   
started to do anything,   
or  
prepared to do anything   
to end your life no  
  
  
  
[place] Fall Scale   
Screening  
Has the patient fallen   
before (or is the patient   
in the ED as a result of   
a  
fall) has not had a fall  
Does the patient have an   
impaired gait does not   
have impaired gait  
Is the patient   
cognitively impaired not   
cognitively impaired  
  
  
Interventions:  
Ferguson Fall Interventions:   
LOW INTERVENTIONS:  
*patient oriented to   
surroundings and call   
system,  
* patient/family falls   
education completed  
and documented,  
*patients fall status   
communicated  
during bedside handoff,  
*whiteboard updated,  
*mode of toileting   
discussed with patient,  
*bed in low position with   
brakes locked,  
*call light in reach,  
* non-skid footwear  
  
  
TRAVEL HISTORY  
Travel History  
Coronavirus Screening: no   
exposure or symptoms  
Travel Exposure History:   
NO travel to   
International locations   
in the past 30  
days  
  
  
PAIN  
  
Pain Scale Used: RENEE  
Pain Rating (0-10): 2 =   
Mild  
  
  
  
  
  
Past Medical History:  
Past Medical History   
Reviewedyes  
  
stomach ulcers: Past   
Medical History, Active  
  
  
Electronic Signatures:  
Jorge Mathew (RN)   
(Signed 2023   
07:43)  
Authored: Quick Triage,   
Chart Review  
Alexander Engel (EMT-P)   
(Signed 2023   
07:00)  
Entered: Risk Screens,   
Pain, Travel History,   
Chart Review, Scores,   
Past  
Medical History  
Authored: Quick Triage,   
Risk Screens, Pain,   
Travel History, Chart   
Review,  
Scores, Past Medical   
History  
  
  
Last Updated: 2023   
07:43 by Jorge Mathew   
(RN)                Normal                                  [facility]  
   
                                                    UA DIP, URINE (POC)on 2023   
   
                                                    BILIRUBIN UA   
(POCT)          Negative                        Negative        [facility]  
   
                                                    CLARITY UA   
(POCT)          Slightly Cloudy                                 [facility]  
   
                      COLOR UA (POCT) Other                            [facility]  
   
                                                    GLUCOSE UA   
(POCT)          Negative                        Negative mg/dL  [facility]  
   
                                                    HEMOGLOBIN/BLOOD   
UA (POCT)       Trace-intact    Abnormal        Negative        [facility]  
   
                      KETONE UA (POCT) Negative              Negative mg/dL [facility]  
   
                                                    LEUKOCYTES UA   
(POCT)          Small           Abnormal        Negative        [facility]  
   
                                                    NITRITE UA   
(POCT)          Negative                        Negative        [facility]  
   
                      PH UA (POCT) 6.5                   4.5 - 8.0  [facility]  
   
                      Protein Ql (U) Negative              [facility] mg[facility]dL [facility]  
[facility]   
[facility]  
   
                                                    SPECIFIC GRAVITY   
UA (POCT)       1.020                           1.005 - 1.030   [facility]  
   
                                                    UROBILINOGEN UA   
(POCT)          0.2 E.U./dL                     Normal E.U./dL  [facility]  
   
                                                    XR SHOULDER GENERAL 3V OR MO  
RE AP/TRUE AP/OTHER LEFTon 2023   
   
                                                                  [place]   
[facility]  
   
                                                    XR Shoulder - left 3 Viewson  
 2023   
   
                                                            IMPRESSION: No acute  
   
radiographic   
abnormalities seen in the   
left  
shoulder.  
  
  
  
  
: PSCB  
Transcribe Date/Time: 2023 12:38P  
  
Dictated by : KARL BARRAGAN MD  
  
This examination was   
interpreted and the   
report reviewed and  
electronically signed by:  
KARL BARRAGAN MD on 2023 12:40PM EST  
  
                                                            DIVISION OF   
RADIOLOGY  
   
                                                            * * *Final Report* *  
 *  
  
DATE OF EXAM: 2023   
12:31PM  
  
WOX 5252 - XR SHLDR >/=3V   
AP/BRET AP/OTHR LT /   
ACCESSION # [id number]  
  
PROCEDURE REASON: Pain  
  
* * * * Physician   
Interpretation * * * *  
  
EXAM TITLE: XR SHLDR   
>/=3V AP/BRET AP/OTHR LT  
  
EXAM DATE/TIME: 2023   
12:31 PM  
  
COMPARISON: None.  
  
CLINICAL   
INDICATION/HISTORY:   
Shoulder pain  
  
TECHNIQUE: AP, true AP   
and Y views of the left   
shoulder are presented  
  
FINDINGS:  
No acute fractures or   
subluxations are noted.  
The acromioclavicular and   
glenohumeral joint spaces   
are maintained.  
Tiny cystic components   
seen in the acromion,   
nonspecific.  
Normal acromiohumeral   
interval.  
The mineralization of the   
bones is normal.  
There is no significant   
soft tissue swelling.  
                                                            DIVISION OF   
RADIOLOGY  
   
                                                            Provider, [facility] - 2023   
Formatting of this note   
might be different from   
the original.  
* * *Final Report* * *  
  
DATE OF EXAM: 2023   
12:31PM  
  
WOX 5252 - XR SHLDR >/=3V   
AP/BRET AP/OTHR LT /   
ACCESSION # [id number]  
  
PROCEDURE REASON: Pain  
  
* * * * Physician   
Interpretation * * * *  
  
EXAM TITLE: XR SHLDR   
>/=3V AP/BRET AP/OTHR LT  
  
EXAM DATE/TIME: 2023   
12:31 PM  
  
COMPARISON: None.  
  
CLINICAL   
INDICATION/HISTORY:   
Shoulder pain  
  
TECHNIQUE: AP, true AP   
and Y views of the left   
shoulder are presented  
  
FINDINGS:  
No acute fractures or   
subluxations are noted.  
The acromioclavicular and   
glenohumeral joint spaces   
are maintained.  
Tiny cystic components   
seen in the acromion,   
nonspecific.  
Normal acromiohumeral   
interval.  
The mineralization of the   
bones is normal.  
There is no significant   
soft tissue swelling.  
  
IMPRESSION  
IMPRESSION: No acute   
radiographic   
abnormalities seen in the   
left  
shoulder.  
  
  
  
  
: PSCB  
Transcribe Date/Time: 2023 12:38P  
  
Dictated by : KARL BARRAGAN MD  
  
This examination was   
interpreted and the   
report reviewed and  
electronically signed by:  
KARL BARRAGAN MD on 2023 12:40PM EST  
  
  
                                                            [facility]  
   
                                                    Radiology Study   
observation   
(narrative)                                                     [facility]  
   
                                                    XR Shoulder - left 3 ViewsOr  
dered By: [facility] Provider on 2023   
   
                                                                  [facility] A MOLECULAR (POC)on    
   
                                                    Procedural   
Control         Valid                                           [facility]  
   
                      Strep A (POCT) Negative              Negative   [facility]  
   
                                                    STREP A MOLECULAR (POC)on    
   
                                                    Procedural   
Control         Valid                                           [facility]  
   
                      Strep A (POCT) Positive   Abnormal   Negative   [facility]  
   
                                                    STREP A MOLECULAR (POC)on 04  
-   
   
                                                    Procedural   
Control         Valid                                           [facility]  
   
                      Strep A (POCT) Positive   Abnormal   Negative   [facility]  
   
                                                    No Panel Informationon    
   
                                                                  [facility]  
   
                                                    Influenza virus A and B RNA   
and SARS-CoV-2 (COVID-19) N gene panel KASSIDY+probe   
(Resp)on   
2023   
   
                                                    FLUAV RNA   
KASSIDY+probe Ql   
(Unsp spec)         Negative                                Negative for   
Influenza A by   
RT-PCR                                  [facility]  
   
                                                    FLUBV RNA   
KASSIDY+probe Ql   
(Unsp spec)         Negative                                Negative for   
Influenza B by   
RT-PCR                                  [facility]  
   
                                                    SARS-CoV-2   
(COVID-19) RNA   
KASSIDY+probe Ql   
(Resp)                                  SARS-CoV-2 (Agent of   
COVID-19) Not Detected by   
RT-PCR or equivalent   
method.                                 Not Detected        [facility]  
   
                                                    STREP A MOLECULAR (POC)on    
   
                                                    Procedural   
Control         Valid                                           [facility]  
   
                      Strep A (POCT) Negative              Negative   [facility]  
   
                                                    ALLIED HEALTHon 2022   
   
                                        [facility]       HNO ID: [id number]  
Author: RT Parag(NOEMI)  
Service: Radiology  
Author Type: Technologist  
Type: Allied Health  
Filed: 2022 5:20 PM  
Note Text:  
Radiology Service   
Progress Note  
PATIENT NAME: Deanna Rowell  
MRN: [id number]  
DATE OF SERVICE:   
2022  
TIME: 5:20 PM  
PATIENT IDENTITY   
VERIFICATION COMPLETED   
USING TWO (2)   
IDENTIFIERS: Name  
and Date of Birth   
confirmed by patient   
verbally and Name and   
Date of Birth  
confirmed by   
identification band.  
FALL SCREENING: Has the   
patient had 2 falls in   
the last year or 1 fall  
with injury or currently   
using an Ambulatory   
Assistive Device (Walker,  
Cane, Wheelchair,   
Crutches, etc.)?   
Emergency Room Patient:   
Screened in ED  
PATIENT GENDER DATA:   
Female. Pregnancy status:   
Pregnant: No  
Breastfeeding status: NO.  
PATIENT RELEVANT IMPLANT   
DATA REVIEWED: Not   
Applicable  
RADIOLOGY DEPARTMENT:   
General X-ray: Exam(s)   
Completed: Chest X-Ray  
PERIPHERAL IV DATA: Not   
applicable  
SIGNED BY: RT Parag(R)  
2022 5:20   
PM                  Normal                                  [facility]  
   
                                                    Basic metabolic 2000 panelon  
 2022   
   
                                                    Anion gap   
[Moles/Vol]     8 mmol/L        Low             9-18            [facility]  
   
                                                    Comment on   
above:                                  Order Comment: Specimen Type: BLOOD SPEC  
IMENOrdering Facility:   
[facility] Address: [address] [address]   
   
                                                            Performed By: #### 2  
4321-2, FLORI,  ####[facility]   
[id number] [place] [address] [place]   
[place]   
   
                                                    Calcium   
[Mass/Vol]      9.1 mg/dL       Normal          8.5-10.2        [facility]  
   
                                                    Comment on   
above:                                  Order Comment: Specimen Type: BLOOD SPEC  
IMENOrdering Facility:   
[facility] Address: [address] [address]   
   
                                                            Performed By: #### 2  
4321-2, FLORI,  ####[facility]   
[id number] [place] [address] [place]   
[place]   
   
                                                    Chloride   
[Moles/Vol]     106 mmol/L      High                      [facility]  
   
                                                    Comment on   
above:                                  Order Comment: Specimen Type: BLOOD SPEC  
IMENOrdering Facility:   
[facility] Address: [address] [place] [address]   
   
                                                            Performed By: #### 2  
4321-2, FLORI,  ####[facility]   
[id number] [address] [id number] [place]   
[place]   
   
                      CO2 [Moles/Vol] 26 mmol/L  Normal     22-30      [facility]  
   
                                                    Comment on   
above:                                  Order Comment: Specimen Type: BLOOD SPEC  
IMENOrdering Facility:   
[facility] Address: [address] [address]   
   
                                                            Performed By: #### 2  
4321-2, FLORI,  ####[facility]   
[id number] [address] [place]   
[place]   
   
                                                    Creatinine   
[Mass/Vol]      0.66 mg/dL      Normal          0.58-0.96       [facility]  
   
                                                    Comment on   
above:                                  Order Comment: Specimen Type: BLOOD SPEC  
IMENOrdering Facility:   
[facility] Address: [address] [place] [address]   
   
                                                            Performed By: #### 2  
4321-2, FLORI,  ####[facility]   
[id number] [address]   
   
                                                    ESTIMATED   
GLOMERULAR   
FILTRATION RATE 130 mL/min/1.73m??? Normal          >=60            [facility]  
   
                                                    Comment on   
above:                                  Order Comment: Specimen Type: BLOOD SPEC  
IMENOrdering Facility:   
[facility] Address: [address] [place] [address]   
   
                                                            Result Comment: Yesenia  
mated Glomerular Filtration Rate (eGFR) is   
calculated using the  CKD-EPI creatinine equation. This equation   
utilizes serum creatinine, sex, and age as parameters. The creatinine   
assay has traceable calibration to isotope dilution-mass   
spectrometry. Refer to KDIGO guidelines for clinical interpretation.   
In patients with unstable renal function, e.g. those with acute   
kidney injury, the eGFR may not accurately reflect actual GFR.   
   
                                                            Performed By: #### 2  
4321-2, FLORI,  ####[facility]   
[id number] [place] [address] [place]   
   
                                                    Glucose   
[Mass/Vol]      74 mg/dL        Normal          74-99           [facility]  
   
                                                    Comment on   
above:                                  Order Comment: Specimen Type: BLOOD SPEC  
IMENOrdering Facility:   
[facility] Address: [address] [place] [address]   
   
                                                            Result Comment: The   
American Diabetes Association (ADA) provides   
guidance for cutoff values for fasting glucose and random glucose.   
The ADA defines fasting as no caloric intake for at least 8 hours.   
Fasting plasma glucose results between 100 to 125 mg/dL indicate   
increased risk for diabetes (prediabetes).  
Fasting plasma glucose results greater than or equal to 126 mg/dL   
meet the criteria for diagnosis of diabetes. In the absence of   
unequivocal hyperglycemia, results should be confirmed by repeat   
testing. In a patient with classic symptoms of hyperglycemia or   
hyperglycemic crisis, random plasma glucose results greater than or   
equal to 200 mg/dL meet the criteria for diagnosis of diabetes.  
Reference: Standards of Medical Care in Diabetes 2016, American   
Diabetes Association. Diabetes Care. 2016.39(Suppl 1).   
   
                                                            Performed By: #### 2  
4321-2, FLORI,  ####[facility]   
[id number] [address] [place]   
[place]   
   
                                                    Potassium   
[Moles/Vol]     3.8 mmol/L      Normal          3.7-5.1         [facility]  
   
                                                    Comment on   
above:                                  Order Comment: Specimen Type: BLOOD SPEC  
IMENOrdering Facility:   
[facility] Address: [address] [address]   
   
                                                            Performed By: #### 2  
4321-2, FLORI,  ####[facility]   
[id number] [place] [address] [place]   
   
                                                    Sodium   
[Moles/Vol]     140 mmol/L      Normal          136-144         [facility]  
   
                                                    Comment on   
above:                                  Order Comment: Specimen Type: BLOOD SPEC  
IMENOrdering Facility:   
[facility] Address: [address] [address]   
   
                                                            Performed By: #### 2  
4321-2, FLORI,  ####[facility]   
[id number] [address] [place]   
[place]   
   
                                                    Urea nitrogen   
[Mass/Vol]      7 mg/dL         Normal          7-21            [facility]  
   
                                                    Comment on   
above:                                  Order Comment: Specimen Type: BLOOD SPEC  
IMENOrdering Facility:   
[facility] Address: [address] [place] [address]   
   
                                                            Performed By: #### 2  
4321-2, FLORI,  ####[facility]CL[facility]   
[id number] [address] [place]   
[place]   
   
                                                    CBC W Auto Differential pane  
l (Bld)on 2022   
   
                                                    Basophils (Bld)   
[#/Vol]         10*3/uL         Normal          <0.11           [facility]  
   
                                                    Comment on   
above:                                  Order Comment: Specimen Type: BLOOD SPEC  
IMENOrdering Facility:   
[facility] Address: [address] [address]   
   
                                                            Performed By: #### 5  
7021-8 ####ADKINS LABORATORYCLIA [id number]   
[address] [place] [facility]   
   
                                                    Basophils/100   
WBC (Bld)       0.3 %           Normal                          [facility]  
   
                                                    Comment on   
above:                                  Order Comment: Specimen Type: BLOOD SPEC  
IMENOrdering Facility:   
[facility] Address: [address] [address]   
   
                                                            Performed By: #### 5  
7021-8 ####ADKINS LABORATORYCLIA [id number]   
[address]   
   
                                                    Differential   
cell count   
method Nom (Bld) Auto            Normal                          [facility]  
   
                                                    Comment on   
above:                                  Order Comment: Specimen Type: BLOOD SPEC  
IMENOrdering Facility:   
[facility] Address: [address] [address]   
   
                                                            Performed By: #### 5  
7021-8 ####ADKINS LABORATORYCLIA [id number]   
[address]   
   
                                                    Eosinophils   
(Bld) [#/Vol]   10*3/uL         Normal          <0.46           [facility]  
   
                                                    Comment on   
above:                                  Order Comment: Specimen Type: BLOOD SPEC  
IMENOrdering Facility:   
[facility] Address: [address] [place] [address]   
   
                                                            Performed By: #### 5  
7021-8 ####ADKINS LABORATORYCLIA [id number]   
[address]   
   
                                                    Eosinophils/100   
WBC (Bld)       0.3 %           Normal                          [facility]  
   
                                                    Comment on   
above:                                  Order Comment: Specimen Type: BLOOD SPEC  
IMENOrdering Facility:   
[facility] Address: [address] [address]   
   
                                                            Performed By: #### 5  
7021-8 ####ADKINS LABORATORYCLIA [id number]   
[address]   
   
                                                    Erythrocyte   
distribution   
width (RBC)   
[Ratio]         15.1 %          High            11.5-15.0       [facility]  
   
                                                    Comment on   
above:                                  Order Comment: Specimen Type: BLOOD SPEC  
IMENOrdering Facility:   
[facility] Address: [address] [address]   
   
                                                            Performed By: #### 5  
7021-8 ####ADKINS LABORATORYCLIA [id number]   
[address]   
   
                                                    Hematocrit (Bld)   
[Volume   
fraction]       36.4 %          Normal          36.0-46.0       [facility]  
   
                                                    Comment on   
above:                                  Order Comment: Specimen Type: BLOOD SPEC  
IMENOrdering Facility:   
[facility] Address: [address] [address]   
   
                                                            Performed By: #### 5  
7021-8 ####ADKINS LABORATORYCLIA [id number]   
[address] [place]   
   
                                                    Hemoglobin (Bld)   
[Mass/Vol]      11.7 g/dL       Normal          11.5-15.5       [facility]  
   
                                                    Comment on   
above:                                  Order Comment: Specimen Type: BLOOD SPEC  
IMENOrdering Facility:   
[facility] [address] [address]   
   
                                                            Performed By: #### 5  
7021-8 ####ADKINS LABORATORYCLIA [id number]   
[address] [place]   
   
                      IMMATURE GRAN % 0.2 %      Normal                [facility]  
   
                                                    Comment on   
above:                                  Order Comment: Specimen Type: BLOOD SPEC  
IMENOrdering Facility:   
[facility] Address: [address] [place] [address]   
   
                                                            Performed By: #### 5  
7021-8 ####ADKINS LABORATORYCLIA [id number]   
[address] [place]   
   
                                                    IMMATURE GRAN   
ABS             <0.03           Normal          <0.10           [facility]  
   
                                                    Comment on   
above:                                  Order Comment: Specimen Type: BLOOD SPEC  
IMENOrdering Facility:   
[facility] Address: [address] [place] [address]   
   
                                                            Performed By: #### 5  
7021-8 ####ADKINS LABORATORYCLIA [id number]   
[address] [place]   
   
                                                    Lymphocytes   
(Bld) [#/Vol]   1.65 10*3/uL    Normal          1.00-4.00       [facility]  
   
                                                    Comment on   
above:                                  Order Comment: Specimen Type: BLOOD SPEC  
IMENOrdering Facility:   
[facility] Address: [address] [address]   
   
                                                            Performed By: #### 5  
7021-8 ####ADKINS LABORATORYCLIA [id number]   
[address]   
   
                                                    Lymphocytes/100   
WBC (Bld)       28.3 %          Normal                          [facility]  
   
                                                    Comment on   
above:                                  Order Comment: Specimen Type: BLOOD SPEC  
IMENOrdering Facility:   
[facility] Address: [address] [address]   
   
                                                            Performed By: #### 5  
7021-8 ####ADKINS LABORATORYCLIA [id number]   
[address]   
   
                                                    MCH (RBC)   
[Entitic mass]  26.8 pg         Normal          26.0-34.0       [facility]  
   
                                                    Comment on   
above:                                  Order Comment: Specimen Type: BLOOD SPEC  
IMENOrdering Facility:   
[facility] Address: [address] [place] [address]   
   
                                                            Performed By: #### 5  
7021-8 ####ADKINS LABORATORYCLIA [id number]   
[address]   
   
                                                    MCHC (RBC)   
[Mass/Vol]      32.1 g/dL       Normal          30.5-36.0       [facility]  
   
                                                    Comment on   
above:                                  Order Comment: Specimen Type: BLOOD SPEC  
IMENOrdering Facility:   
[facility] Address: [address] [place] [address]   
   
                                                            Performed By: #### 5  
7021-8 ####ADKINS LABORATORYCLIA [id number]   
[address]   
   
                                                    MCV (RBC)   
[Entitic vol]   83.3 fL         Normal          80.0-100.0      [facility]  
   
                                                    Comment on   
above:                                  Order Comment: Specimen Type: BLOOD SPEC  
IMENOrdering Facility:   
[facility] Address: [address] [place] [address]   
   
                                                            Performed By: #### 5  
7021-8 ####ADKINS LABORATORYCLIA [id number]   
[address]   
   
                                                    Monocytes (Bld)   
[#/Vol]         0.54 10*3/uL    Normal          <0.87           [facility]  
   
                                                    Comment on   
above:                                  Order Comment: Specimen Type: BLOOD SPEC  
IMENOrdering Facility:   
[facility] Address: [address] [place] [address]   
   
                                                            Performed By: #### 5  
7021-8 ####ADKINS LABORATORYCLIA [id number]   
[address]   
   
                                                    Monocytes/100   
WBC (Bld)       9.3 %           Normal                          [facility]  
   
                                                    Comment on   
above:                                  Order Comment: Specimen Type: BLOOD SPEC  
IMENOrdering Facility:   
[facility] Address: [address] [address]   
   
                                                            Performed By: #### 5  
7021-8 ####ADKINS LABORATORYCLIA [id number]   
[address] [place]   
   
                                                    Neutrophils   
(Bld) [#/Vol]   3.59 10*3/uL    Normal          1.45-7.50       [facility]  
   
                                                    Comment on   
above:                                  Order Comment: Specimen Type: BLOOD SPEC  
IMENOrdering Facility:   
[facility] Address: [address] [address]   
   
                                                            Performed By: #### 5  
7021-8 ####ADKINS LABORATORYCLIA [id number]   
[address]   
   
                                                    Neutrophils/100   
WBC (Bld)       61.6 %          Normal                          [facility]  
   
                                                    Comment on   
above:                                  Order Comment: Specimen Type: BLOOD SPEC  
IMENOrdering Facility:   
[facility] Address:  [address] [address]   
   
                                                            Performed By: #### 5  
7021-8 ####ADKINS LABORATORYCLIA [id number]   
[place] [address] [place]   
   
                                                    Nucleated RBC   
(Bld) [#/Vol]   10*3/uL         Normal          <0.01           [facility]  
   
                                                    Comment on   
above:                                  Order Comment: Specimen Type: BLOOD SPEC  
IMENOrdering Facility:   
[facility] Address: [address] [address]   
   
                                                            Performed By: #### 5  
7021-8 ####ADKINS LABORATORYCLIA [id number]   
[address]   
   
                                                    Nucleated   
RBC/100 WBC   
(Bld) [Ratio]   0.0 /100 WBC    Normal                          [facility]  
   
                                                    Comment on   
above:                                  Order Comment: Specimen Type: BLOOD SPEC  
IMENOrdering Facility:   
[facility] Address: [facility] [address]   
   
                                                            Performed By: #### 5  
7021-8 ####ADKINS LABORATORYCLIA [id number]   
[place] [address] [place]   
   
                                                    Platelet mean   
volume (Bld)   
[Entitic vol]   9.9 fL          Normal          9.0-12.7        [facility]  
   
                                                    Comment on   
above:                                  Order Comment: Specimen Type: BLOOD SPEC  
IMENOrdering Facility:   
[facility] Address: [address] [place], [place] [id number]   
   
                                                            Performed By: #### 5  
7021-8 ####ADKINS LABORATORYCLIA [id number]   
[address] [place]   
   
                                                    Platelets (Bld)   
[#/Vol]         255 10*3/uL     Normal          150-400         [facility]  
   
                                                    Comment on   
above:                                  Order Comment: Specimen Type: BLOOD SPEC  
IMENOrdering Facility:   
[facility] Address: [address] [place] [address]   
   
                                                            Performed By: #### 5  
7021-8 ####ADKINS LABORATORYCLIA [id number]   
[place] [address] [place]   
   
                                                    RBC (Bld)   
[#/Vol]         4.37 10*6/uL    Normal          3.90-5.20       [facility]  
   
                                                    Comment on   
above:                                  Order Comment: Specimen Type: BLOOD SPEC  
IMENOrdering Facility:   
[facility] Address: [address] [place] [address]   
   
                                                            Performed By: #### 5  
7021-8 ####ADKINS LABORATORYCLIA [id number]   
[address] [place]   
   
                                                    WBC (Bld)   
[#/Vol]         5.83 10*3/uL    Normal          3.70-11.00      [facility]  
   
                                                    Comment on   
above:                                  Order Comment: Specimen Type: BLOOD SPEC  
IMENOrdering Facility:   
[facility] Address: [address] [place], [place] [id number]   
   
                                                            Performed By: #### 5  
7021-8 ####ADKINS LABORATORYCLIA [id number]   
[address] [place]   
   
                                                    D dimer FEU PPP-mCncon    
   
                                                    Fibrin D-dimer   
FEU (PPP)   
[Mass/Vol]      200 ng/mL FEU   Normal          <500            [facility]  
   
                                                    Comment on   
above:                                  Order Comment: Specimen Type: BLOOD SPEC  
IMENOrdering Facility:   
[facility] Address: [address] [place], [place] [id number]   
   
                                                            Performed By: #### 4  
8065-7 ####ADKINS LABORATORYIA [id number]   
[place] [address] [place]   
   
                                                    ECG COMPLETEon 2022   
   
                                        ECG COMPLETE        Ventricular Rate : 6  
2 BPM  
Atrial Rate : 62 BPM  
P-R Interval : 168 ms  
QRS Duration : 82 ms  
Q-T Interval : 426 ms  
QTC Calculation(Bazett) :   
432 ms  
Calculated P Axis : 25   
degrees  
Calculated R Axis : 84   
degrees  
Calculated T Axis : 44   
degrees  
NORMAL SINUS RHYTHM  
NORMAL ECG  
1738 no STEMI  
Confirmed by ANILA BRUSH MD ([id number]),    
KARUNA SERVIN ([id number])   
on 9/15/2022 7:04:18 AM  
NAME : DEANNA ROWELL  
PID : [id number]  
 : Aug 25 2005  
Gender : Female  
Race :   
ORD : [id number]  
  
Procedure Date : Sep 14   
2022 17:30:44  
Edit Date : Sep 15 2022   
07:04:21  
  
Diagnosis: NORMAL SINUS   
RHYTHM  
NORMAL ECG  
  
1738 no STEMI  
Confirmed by ANILA BRUSH MD ([id number]),    
KARUNA SERVIN ([id number])   
on 9/15/2022 7:04:18 AM  
  
Test Reason : Chest Pain  
  
Location : 1 : ER ED10  
  
Overread By : ANILA BRUSH MD  
Edited By :   
KARUNA SERVIN  
Referred By : ,  
Acquired by : ELVIA SANDERS, [facility]  
   
                                                    ED NOTEon 2022   
   
                                        ED NOTE             HNO ID: [id number]  
Author: Dea Swan RN  
Service: ?  
Author Type: Registered   
Nurse  
Type: ED Notes  
Filed: 2022 7:03 PM  
Note Text:  
Patient discharged, given   
verbal and written   
discharge instructions.  
Patient and patients   
mother verbalized   
understanding. Nurse   
discussed  
medication (zofran and   
bentyl) that were   
prescribed and follow up  
appointments (pcp) and   
signs/symptoms of   
worsening conditions, and   
reasons  
why to come back to the   
emergency department. [facility]  
   
                                        ED NOTE             HNO ID: [id number]  
Author: Dea Swan RN  
Service: ?  
Author Type: Registered   
Nurse  
Type: ED Notes  
Filed: 2022 6:35 PM  
Note Text:  
Assumed care of patient,   
received report from   
DORCAS Alonso. Patient resting   
in  
bed, awaiting urine   
results. Mother at   
bedside.            [facility]  
   
                                        ED NOTE             HNO ID: [id number]  
Author: Celeste Sanders RN  
Service: Nursing  
Author Type: Registered   
Nurse  
Type: ED Notes  
Filed: 2022 5:54 PM  
Note Text:  
Pt up to bathroom to   
obtain urine sample. [facility]  
   
                                        ED NOTE             HNO ID: [id number]  
Author: Celeste Sanders RN  
Service: Nursing  
Author Type: Registered   
Nurse  
Type: ED Notes  
Filed: 2022 5:23 PM  
Note Text:  
Urine cup/urinal was   
provided and within reach   
in room. Pt made aware of  
needing urine sample for   
lab results.        [facility]  
   
                                        ED NOTE             HNO ID: [id number]  
Author: Dash Whelan RN  
Service: Nursing  
Author Type: Registered   
Nurse  
Type: ED Notes  
Filed: 2022 4:49 PM  
Note Text:  
Pt presents to ER from   
home for CC N/V/D since   
Monday. Pt woke up to go   
to  
class and was feeling   
unwell. Intermittent   
symptoms since then. VSS   
in  
triage. No CP or SOB. [facility]  
   
                                                    ED PROV NOTEon 2022   
   
                                        ED PROV NOTE        HNO ID: [id number]  
Author: Hung Phillips PA-C  
Service: ?  
Author Type: Physician   
Assistant  
Type: ED Provider Notes  
Filed: 2022 6:42 PM  
Note Text:  
ED Provider Note  
Patient Name: Deanna Rowell  
MRN: [id number]  
: 2005  
SERVICE DATE: 22  
History  
Patient presents with:  
Nausea AND Vomiting  
Diarrhea  
17-year-old female with   
no significant PMH   
presents for nausea,   
vomiting,  
diarrhea. Patient states   
that she has been having   
vomiting and diarrhea  
since Monday. She has had   
a mild cough. She was   
tested for COVID at her  
college campus which was   
negative. She denies sick   
contacts. Patient  
also states she has been   
having some intermittent   
chest pain on the right.  
She states that she is   
having none currently,   
but was having some   
earlier  
this week. She denies any   
fall or injury. She   
denies any abdominal   
pain.  
No urinary symptoms. No   
shortness of breath, but   
reports feeling  
 winded  after walking.   
No recent travel. No   
history of DVT/PE. No  
fevers or chills. No   
other complaints.  
PAST MEDICAL HISTORY  
Diagnosis Date  
NEGATIVE MEDICAL HISTORY  
PAST SURGICAL HISTORY  
Procedure Laterality Date  
LAPAROSCOPIC   
CHOLECYSTECTOMY   
2021  
FAMILY HISTORY  
Problem Relation Age of   
Onset  
other (heart attack)   
Paternal Grandfather  
Hypertension Other  
maternal side  
Social History  
Tobacco Use  
Smoking status: Never  
Passive exposure: Yes  
Smokeless tobacco: Never  
Tobacco comments:  
smokers outside  
Vaping Use  
Vaping Use: Never used  
Substance and Sexual   
Activity  
Alcohol use: No  
Drug use: No  
Sexual activity: Never  
ALLERGIES  
Allergen Reactions  
Welchol [Colesevela*   
Hives, Itching  
Review of Systems  
Constitutional: Positive   
for fatigue. Negative for   
fever.  
HENT: Negative for   
congestion.  
Eyes: Negative for visual   
disturbance.  
Respiratory: Negative for   
cough and shortness of   
breath.  
Cardiovascular: Positive   
for chest pain.  
Gastrointestinal:   
Positive for diarrhea,   
nausea and vomiting.   
Negative  
for abdominal pain.  
Endocrine: Negative for   
cold intolerance and heat   
intolerance.  
Genitourinary: Negative   
for dysuria, flank pain   
and hematuria.  
Musculoskeletal: Negative   
for arthralgias.  
Skin: Negative for rash.  
Neurological: Negative   
for dizziness and   
headaches.  
Psychiatric/Behavioral:   
Negative for confusion.  
Physical Exam  
Vitals [22 1649]  
BP Pulse Temp Temp src   
Resp SpO2 Weight Height  
123/78 83 37 ?C (98.6 ?F)   
Oral 18 98 % 68 kg (150   
lb) --  
Physical Exam  
Vitals and nursing note   
reviewed.  
Constitutional:  
General: She is not in   
acute distress.  
Appearance: Normal   
appearance. She is not   
toxic-appearing.  
HENT:  
Head: Normocephalic and   
atraumatic.  
Nose: Nose normal.  
Mouth/Throat:  
Mouth: Mucous membranes   
are moist.  
Eyes:  
Extraocular Movements:   
Extraocular movements   
intact.  
Conjunctiva/sclera:   
Conjunctivae normal.  
Pupils: Pupils are equal,   
round, and reactive to   
light.  
Cardiovascular:  
Rate and Rhythm: Normal   
rate and regular rhythm.  
Pulses: Normal pulses.  
Heart sounds: Normal   
heart sounds.  
Pulmonary:  
Effort: Pulmonary effort   
is normal. No respiratory   
distress.  
Breath sounds: Normal   
breath sounds.  
Abdominal:  
General: Bowel sounds are   
normal.  
Palpations: Abdomen is   
soft.  
Tenderness: There is no   
abdominal tenderness.  
Musculoskeletal:  
General: Normal range of   
motion.  
Cervical back: Normal   
range of motion.  
Skin:  
General: Skin is warm and   
dry.  
Capillary Refill:   
Capillary refill takes   
less than 2 seconds.  
Neurological:  
Mental Status: She is   
alert and oriented to   
person, place, and time.  
Psychiatric:  
Mood and Affect: Mood   
normal.  
Behavior: Behavior   
normal.  
Diagnostic Testing  
ED Labs Ordered and   
Reviewed - No data to   
display  
Procedures  
ED Course / Clinical   
Impression  
Clinical Impressions as   
of 22 1842  
Nausea vomiting and   
diarrhea  
Chest pain, unspecified   
type  
MDM / Disposition / Plan  
MDM  
Vitals reviewed.  
Triage records reviewed.  
Nursing notes reviewed.  
17-year-old female   
presents for nausea,   
vomiting, diarrhea.  
Patient   
nontoxic-appearing.   
Vitals are stable.   
Abdomen soft and  
nontender. Lungs clear.   
Afebrile.  
ECG reveals normal sinus   
rhythm, normal ECG  
CBC no leukocytosis, no   
anemia  
CMP reveals chloride 106  
Troponin T <0.010  
D-dimer 200  
hCG negative  
UA normal  
CXR no acute findings  
ED course: 1L NS  
Most likely diagnosis is   
nausea, vomiting,   
diarrhea, chest pain  
Low suspicion for ACS,   
PE, pneumonia,   
pneumothorax, acute   
surgical  
abdomen, appendicitis,   
cholecystitis based on   
HANDP, lab results and   
imaging  
studies.  
Patient has no abdominal   
pain on exam. Low   
suspicion for acute   
surgical  
abdomen. Labs and CXR are   
normal. Low suspicion for   
acute  
cardiopulmonary process.   
Likely this is a viral   
gastroenteritis. She is  
stable for discharge.  
Patient will be given   
Zofran and Bentyl for   
home. Advised to continue  
drinking plenty of fluids   
and return if worsening.  
Patient instructed follow   
up with PCP (more content   
not included)...    Normal                                  [facility]  
   
                                                    HCG QUAL BLDon 2022   
   
                      HCG, QUALITATIVE Negative   Normal     Negative   [facility]  
   
                                                    Comment on   
above:                                  Order Comment: Specimen Type: BLOOD SPEC  
IMENOrdering Facility:   
[facility] Address: [address] [place] [address]   
   
                                                            Performed By: #### H  
CG ####[facility]ORATORYCLIA [id number] [place] [address] [place]   
   
                                                    Magnesium SerPl-mCncon    
   
                                                    Magnesium   
[Mass/Vol]      1.9 mg/dL       Normal          1.7-2.3         [facility]  
   
                                                    Comment on   
above:                                  Order Comment: Specimen Type: BLOOD SPEC  
IMENOrdering Facility:   
[facility] Address: [address] [place] [address]   
   
                                                            Performed By: #### 2  
4321-2, FLORI, 88174-6 ####[facility]ORYCL[facility]   
[id number] [place] [address] [place]   
   
                                                    TROPONIN Ton 2022   
   
                                                    Troponin   
T.cardiac   
[Mass/Vol]      ug/L            Normal          0.000-0.029     [facility]  
   
                                                    Comment on   
above:                                  Order Comment: Specimen Type: BLOOD SPEC  
IMENOrdering Facility:   
[facility] Address: [address] [place] [address]   
   
                                                            Performed By: #### 2  
4321-2, FLORI, 45615-6 ####[facility]   
[id number] [place] [address] [place] [facility] [place]   
[place]   
   
                                                    URINALYSIS, REFLEX MICROSCOP  
ICon 2022   
   
                                                    Bacteria LM.HPF   
(Urine sed)   
[#/Area]        Few             Abnormal        None Seen       [facility]  
   
                                                    Comment on   
above:                                  Order Comment: Specimen Type: URINE SPEC  
IMENOrdering Facility:   
[facility] Address: [address] [place] [address]   
   
                                                            Performed By: #### L  
[id number] ####[facility]ATORYCL[facility] [id number]   
[address]   
   
                      Bilirubin Ql (U) Negative   Normal     Negative   [facility]  
   
                                                    Comment on   
above:                                  Order Comment: Specimen Type: URINE SPEC  
IMENOrdering Facility:   
[facility] Address: [address] [place] [address]   
   
                                                            Performed By: #### L  
[id number] ####[facility]ATORYCLIA [id number]   
[address]   
   
                                                    Clarity (Unsp   
spec)           Clear           Normal          Clear           [facility]  
   
                                                    Comment on   
above:                                  Order Comment: Specimen Type: URINE SPEC  
IMENOrdering Facility:   
[facility] Address: [address] [place] [address]   
   
                                                            Performed By: #### L  
[id number] ####[facility]ATORYCLIA [id number]   
[address]   
   
                      Color (U)  Yellow     Normal     Yellow     [facility]  
   
                                                    Comment on   
above:                                  Order Comment: Specimen Type: URINE SPEC  
IMENOrdering Facility:   
[facility] Address: [address] [place] [address]   
   
                                                            Performed By: #### L  
[id number] ####[facility]ATORYCLIA [id number]   
[address] [place]   
   
                                                    Epithelial cells   
LM.HPF (Urine   
sed) [#/Area]   Few             Normal                          [facility]  
   
                                                    Comment on   
above:                                  Order Comment: Specimen Type: URINE SPEC  
IMENOrdering Facility:   
[facility] Address: [address] [address]   
   
                                                            Performed By: #### L  
[id number] ####[facility]ATORYCLIA [id number]   
[address]   
   
                                                    Glucose Test   
strip (U)   
[Mass/Vol]      Negative        Normal          Negative        [facility]  
   
                                                    Comment on   
above:                                  Order Comment: Specimen Type: URINE SPEC  
IMENOrdering Facility:   
[facility] Address: [address] [address]   
   
                                                            Performed By: #### L  
[id number] ####[facility]ATORYCLIA [id number]   
[address] [place]   
   
                                                    Hemoglobin Ql   
(U)             Negative        Normal          Negative        [facility]  
   
                                                    Comment on   
above:                                  Order Comment: Specimen Type: URINE SPEC  
IMENOrdering Facility:   
[facility] Address: [address] [address]   
   
                                                            Performed By: #### L  
[id number] ####[facility]ATORYCLIA [id number]   
[address] [place]   
   
                      Ketones Ql (U) Negative   Normal     Negative   [facility]  
   
                                                    Comment on   
above:                                  Order Comment: Specimen Type: URINE SPEC  
IMENOrdering Facility:   
[facility] Address: [address] [place] [address]   
   
                                                            Performed By: #### L  
[id number] ####[facility]ATORYCLIA [id number]   
[address]   
   
                                                    Leukocyte   
esterase Test   
strip Ql (U)    Negative        Normal          Negative        [facility]  
   
                                                    Comment on   
above:                                  Order Comment: Specimen Type: URINE SPEC  
IMENOrdering Facility:   
[facility] Address: [address] [address]   
   
                                                            Performed By: #### L  
[id number] ####[facility]ATORYCLIA [id number]   
[address] [place]   
   
                      Nitrite Ql (U) Negative   Normal     Negative   [facility]  
   
                                                    Comment on   
above:                                  Order Comment: Specimen Type: URINE SPEC  
IMENOrdering Facility:   
[facility] Address: [address] [place] [address]   
   
                                                            Performed By: #### L  
[id number] ####[facility]ATORYCLIA [id number]   
[address]   
   
                      pH (U)     8.5 [pH]   High       5.0-8.0    [facility]  
   
                                                    Comment on   
above:                                  Order Comment: Specimen Type: URINE SPEC  
IMENOrdering Facility:   
[facility] Address: [address] [place] [address]   
   
                                                            Performed By: #### L  
[id number] ####[facility]ATORYCLIA [id number]   
[address]   
   
                                                    Protein (U)   
[Mass/Vol]      Trace           Abnormal        Negative        [facility]  
   
                                                    Comment on   
above:                                  Order Comment: Specimen Type: URINE SPEC  
IMENOrdering Facility:   
[facility] Address: [address] [place] [address]   
   
                                                            Performed By: #### L  
[id number] ####[facility]ORATORYCLIA [id number]   
[address]   
   
                                                    RBC LM.HPF   
(Urine sed)   
[#/Area]        0-3 /HPF        Normal          0-3 /HPF        [facility]  
   
                                                    Comment on   
above:                                  Order Comment: Specimen Type: URINE SPEC  
IMENOrdering Facility:   
[facility] Address: [address] [place] [address]   
   
                                                            Performed By: #### L  
[id number] ####[facility]ORATORYCLIA [id number]   
[address]   
   
                                                    Specific gravity   
(U) [Rel   
density]        1.015           Normal          1.005-1.030     [facility]  
   
                                                    Comment on   
above:                                  Order Comment: Specimen Type: URINE SPEC  
IMENOrdering Facility:   
[facility] Address: [address] [place] [address]   
   
                                                            Performed By: #### L  
[id number] ####[facility]ORATORYCLIA [id number]   
[address]   
   
                                                    Urobilinogen Ql   
(U)             0.2 EU/dL       Normal          0.2-1.0 EU/dL   [facility]  
   
                                                    Comment on   
above:                                  Order Comment: Specimen Type: URINE SPEC  
IMENOrdering Facility:   
[facility] Address: [address] [place] [address] [place]   
[address]   
   
                                                            Performed By: #### L  
[id number] ####[facility]ORATORYCLIA [id number]   
[address] [place]   
   
                                                    WBC LM.HPF   
(Urine sed)   
[#/Area]        0-5 /HPF        Normal          0-5 /HPF        [facility]  
   
                                                    Comment on   
above:                                  Order Comment: Specimen Type: URINE SPEC  
IMENOrdering Facility:   
[facility] Address: [address] [address] [id number]   
   
                                                            Performed By: #### L  
[id number] ####[facility]ORATORYCLIA [id number]   
[place] [address] [place]   
   
                                                    XR CHEST 2V FRONTAL/LATon    
   
                                                    XR CHEST 2V   
FRONTAL/LAT                             * * *Final Report* * *  
DATE OF EXAM: Sep 14 2022   
5:19PM  
MDX 5291 - XR CHEST 2V   
FRONTAL/LAT / ACCESSION #   
[id number]  
PROCEDURE REASON: Chest   
pain, nonspecific  
  
* * * * Physician   
Interpretation * * * *  
EXAMINATION: CHEST   
RADIOGRAPH (2 VIEW   
FRONTAL and LATERAL)  
CLINICAL HISTORY: Chest   
pain, nonspecific  
MQ: XC2_6  
EXAM DATE/TIME: 2022   
5:19 PM  
COMPARISON: No relevant   
prior studies available.  
RESULT:  
Lines, tubes, and   
devices: None.  
Lungs and pleura: No   
consolidation. No lung   
mass. No pleural   
effusion.  
No pneumothorax.  
Cardiomediastinal   
silhouette: Normal   
cardiomediastinal   
silhouette.  
Bones and soft tissues:   
Bilateral nipple   
piercings.  
IMPRESSION:  
No acute radiographic   
abnormality.  
: PSCB  
Transcribe Date/Time: Sep   
14 2022 5:22P  
Dictated by : FEDE PAIGE MD  
This examination was   
interpreted and the   
report reviewed and  
electronically signed by:  
FEDE PAIGE MD on Sep   
14 2022 5:23PM EST  
[id number] Normal                                  [facility]  
   
                                                    UA DIP, URINE (POC)on 2022   
   
                                                    BILIRUBIN UA   
(POCT)          Negative                        Negative        [facility]  
   
                                                    CLARITY UA   
(POCT)          Slightly Cloudy                                 [facility]  
   
                      COLOR UA (POCT) Yellow                           [facility]  
   
                                                    GLUCOSE UA   
(POCT)          Negative                        Negative mg/dL  [facility]  
   
                                                    HEMOGLOBIN/BLOOD   
UA (POCT)       Moderate        Abnormal        Negative        [facility]  
   
                      KETONE UA (POCT) Negative              [facility]  
[facility]  
   
                                                    LEUKOCYTES UA   
(POCT)          Small           Abnormal        Negative        [facility]  
   
                                                    NITRITE UA   
(POCT)          Negative                        Negative        [facility]  
   
                      PH UA (POCT) 5.5                   4.5 - 8.0  [facility]  
   
                      Protein Ql (U) 100 mg/dL  Abnormal   Negative mg[facility]dL [facility]  
   
                                                    SPECIFIC GRAVITY   
UA (POCT)       >=1.030                         1.005 - 1.030   [facility]  
   
                                                    UROBILINOGEN UA   
(POCT)          0.2 E.U./dL                     Normal E.U./dL  [facility]  
   
                                                    CBC and Differentialon 10-09  
-2021   
   
                      Abs Baso   0.03 k/uL  Normal     <0.11      [facility]  
   
                                                    Comment on   
above:                                  Performed By: #### CBCDIF, CMP, LIPA ###  
#[facility]   
[address] [place] [address]   
   
                      Abs Eosin  <0.03      Normal     <0.46      [facility]  
   
                                                    Comment on   
above:                                  Performed By: #### CBCDIF, CMP, LIPA ###  
#[facility]   
[address] [place] [address]   
   
                      Abs Mono   1.19 k/uL  High       <0.87      [facility]  
   
                                                    Comment on   
above:                                  Performed By: #### CBCDIF, CMP, LIPA ###  
#[facility]   
[address] [place] [address]   
   
                      Abs Neut   13.57 k/uL High       1.45-7.50  [facility]  
   
                                                    Comment on   
above:                                  Performed By: #### CBCDIF, CMP, LIPA ###  
#[address] [address]   
   
                      Absolute nRBC <0.01      Normal     <0.01      [facility]  
   
                                                    Comment on   
above:                                  Performed By: #### CBCDIF, CMP, LIPA ###  
#[address] [place] [address]   
   
                                                    Basophils/100   
WBC (Bld)       0.2 %           Normal                          [facility]  
   
                                                    Comment on   
above:                                  Performed By: #### CBCDIF, CMP, LIPA ###  
#[address] [place] [address]   
   
                      DTYPE      Auto Diff  Normal                [facility]  
   
                                                    Comment on   
above:                                  Performed By: #### CBCDIF, CMP, LIPA ###  
#[facility]   
[address] [place] [address]   
   
                                                    Eosinophils/100   
WBC (Bld)       0.1 %           Normal                          [facility]  
   
                                                    Comment on   
above:                                  Performed By: #### CBCDIF, CMP, LIPA ###  
#[address] [place] [address]   
   
                                                    Erythrocyte   
distribution   
width (RBC)   
[Ratio]         13.0 %          Normal          11.5-15.0       [facility]  
   
                                                    Comment on   
above:                                  Performed By: #### CBCDIF, CMP, LIPA ###  
#[address] [place] [address]   
   
                                                    Hematocrit (Bld)   
[Volume   
fraction]       35.5 %          Low             36.0-46.0       [facility]  
   
                                                    Comment on   
above:                                  Performed By: #### CBCDIF, CMP, LIPA ###  
#[address] [place] [address]   
   
                                                    Hemoglobin (Bld)   
[Mass/Vol]      11.5 g/dL       Normal          11.5-15.5       [facility]  
   
                                                    Comment on   
above:                                  Performed By: #### CBCDIF, CMP, LIPA ###  
#[address] [address]   
   
                                                    Lymphocytes   
(Bld) [#/Vol]   1.28 10*3/uL    Normal          1.00-4.00       [facility]  
   
                                                    Comment on   
above:                                  Performed By: #### CBCDIF, CMP, LIPA ###  
#[address] [place] [address]   
   
                                                    Lymphocytes/100   
WBC (Bld)       8.0 %           Normal                          [facility]  
   
                                                    Comment on   
above:                                  Performed By: #### CBCDIF, CMP, LIPA ###  
#[address] [place] [address]   
   
                      MCH        28.8 pG    Normal     26.0-34.0  [facility]  
   
                                                    Comment on   
above:                                  Performed By: #### CBCDIF, CMP, LIPA ###  
#[address] [place] [address]   
   
                                                    MCHC (RBC)   
[Mass/Vol]      32.4 g/dL       Normal          30.5-36.0       [facility]  
   
                                                    Comment on   
above:                                  Performed By: #### CBCDIF, CMP, LIPA ###  
#[address] [address]   
   
                                                    MCV (RBC)   
[Entitic vol]   89.0 fL         Normal          80.0-100.0      [facility]  
   
                                                    Comment on   
above:                                  Performed By: #### CBCDIF, CMP, LIPA ###  
#[facility]   
[address] [place] [address]   
   
                                                    Monocytes/100   
WBC (Bld)       7.4 %           Normal                          [facility]  
   
                                                    Comment on   
above:                                  Performed By: #### CBCDIF, CMP, LIPA ###  
#[facility]   
[address] [place] [address]   
   
                                                    Neutrophils/100   
WBC (Bld)       84.3 %          Normal                          [facility]  
   
                                                    Comment on   
above:                                  Performed By: #### CBCDIF, CMP, LIPA ###  
#[facility]   
[address] [place] [address]   
   
                      NRBCs      0.0 /100 WBC Normal     0          [facility]  
   
                                                    Comment on   
above:                                  Performed By: #### CBCDIF, CMP, LIPA ###  
#[facility]   
[address] [place] [address]   
   
                                                    Platelet mean   
volume (Bld)   
[Entitic vol]   10.6 fL         Normal          9.0-12.7        [facility]  
   
                                                    Comment on   
above:                                  Performed By: #### CBCDIF, CMP, LIPA ###  
#[facility]   
[address] [place] [address]   
   
                                                    Platelets (Bld)   
[#/Vol]         276 10*3/uL     Normal          150-400         [facility]  
   
                                                    Comment on   
above:                                  Performed By: #### CBCDIF, CMP, LIPA ###  
#[facility]   
[address] [place] [address]   
   
                                                    RBC (Bld)   
[#/Vol]         3.99 10*6/uL    Normal          3.90-5.20       [facility]  
   
                                                    Comment on   
above:                                  Performed By: #### CBCDIF, CMP, LIPA ###  
#[facility]   
[address] [place] [address]   
   
                                                    WBC (Bld)   
[#/Vol]         16.09 10*3/uL   High            3.70-11.00      [facility]  
   
                                                    Comment on   
above:                                  Performed By: #### CBCDIF, CMP, LIPA ###  
#[facility]   
[address] [place] [address]   
   
                                                    CT ABD/PEL W IVCONon 10-09-2  
021   
   
                                                    CT ABD/PEL W   
IVCON                                   * * *Final Report* * *  
DATE OF EXAM: Oct 9 2021   
4:24PM  
[facility] [place]BD/PEL [place]   
IVCON / ACCESSION #   
[id number]  
PROCEDURE REASON:   
Abdominal pain, acute,   
nonlocalized  
  
* * * * Physician   
Interpretation * * * *  
EXAMINATION: CT ABDOMEN   
AND PELVIS WITH IV   
CONTRAST  
CLINICAL HISTORY:   
Abdominal pain, acute,   
nonlocalized  
ABD PAIN  
TECHNIQUE: CT of the   
abdomen and pelvis was   
performed using standard  
technique, scanning from   
just above the dome of   
the diaphragm to the  
symphysis pubis.  
MQ: CTAP_3  
Contrast:  
IV: 100 ml of Omnipaque   
300  
CT Radiation dose:   
Integrated Dose-length   
product (DLP) for this   
visit =  
616 mGy*cm.  
CT Dose Reduction   
Employed: Automated   
exposure control (AEC)  
COMPARISON: 2020  
RESULT:  
Liver: No mass. There is   
low density adjacent to   
the falciform ligament  
which likely represents   
focal fat.  
Biliary: No bile duct   
dilation. Gallbladder is   
absent. There are  
surgical clips in the   
gallbladder fossa.  
Spleen: No mass. No   
splenomegaly. A small   
splenule is identified  
anteriorly.  
Pancreas: No mass or duct   
dilation.  
Adrenals: No mass.  
Kidneys: No mass,   
calculus or   
hydronephrosis.  
GI tract: No dilation or   
wall thickening. Cecum is   
in the right lower  
quadrant. The appendix is   
normal in caliber and   
filled with air.  
Lymph nodes: No abdominal   
or pelvic   
lymphadenopathy.  
Mesentery/Peritoneum: No   
ascites or mass.  
Retroperitoneum: No mass.  
Vasculature: The celiac   
axis and SMA are patent.   
The portal vein and  
branches, splenic vein,   
SMV, and hepatic veins   
are patent.  
Pelvis: Left adnexa   
appears larger than the   
right. There is   
suggestion  
of fluid-filled tubular   
structure on the left.   
Evaluation for pelvic  
structures and to   
determine if there is   
possible hydrosalpinx or   
cyst  
would be better done by   
pelvic ultrasound.  
Bones/Soft Tissues: No   
significant finding.  
Lower thorax:   
Unremarkable.  
 (topogram) images:   
Unremarkable.  
IMPRESSION:  
1. Left adnexa is larger   
than the right and there   
is suggestion of a  
fluid-filled tubular   
structure on the left.   
Pelvic ultrasound may be  
helpful, if clinically   
indicated, to evaluate   
for possible hydrosalpinx  
versus ovarian cyst,   
among other etiologies.  
2. Normal appendix.  
: ABDOUL  
Transcribe Date/Time: Oct   
9 2021 4:35P  
Dictated by : JOSE DE JESUS MARIE DO  
This examination was   
interpreted and the   
report reviewed and  
electronically signed by:  
JOSE DE JESUS MARIE DO on Oct 9   
2021 4:48PM EST  
[id number] Normal                                  [facility]  
   
                                                    Comp Metabolic Panelon 10-09  
-2021   
   
                                                    Albumin   
[Mass/Vol]      4.3 g/dL        Normal          3.9-4.9         [facility]  
   
                                                    Comment on   
above:                                  Performed By: #### CBCDIF, CMP, LIPA ###  
#[facility]   
[address] [place] [address]   
   
                                                    ALP [Catalytic   
activity/Vol]   78 U/L          Normal          45-87           [facility]  
   
                                                    Comment on   
above:                                  Result Comment: Reference ranges were no  
t locally established for   
this patient's age group. The normal values are based on the   
following source: Amarilis MP, Elias WHEELER, et al. CLSI based transference   
of the CALIPER database of pediatric reference intervals from [employer]   
to Otf, Ortho, Roche, and [employer] Clinical Chemistry Assays:   
Direct validation using reference samples from the CALIPER cohort.   
Clin Biochem.   
   
                                                            Performed By: #### C  
BCDIF, CMP, LIPA ####[facility]   
[address] [place] [address]   
   
                                                    ALT [Catalytic   
activity/Vol]   29 U/L          Normal          7-38            [facility]  
   
                                                    Comment on   
above:                                  Performed By: #### CBCDIF, CMP, LIPA ###  
#[facility]   
[address] [place] [address]   
   
                                                    Anion gap   
[Moles/Vol]     13 mmol/L       Normal          9-18            [facility]  
   
                                                    Comment on   
above:                                  Performed By: #### CBCDIF, CMP, LIPA ###  
#[facility]   
[address] [place] [address]   
   
                                                    AST [Catalytic   
activity/Vol]   25 U/L          Normal          13-35           [facility]  
   
                                                    Comment on   
above:                                  Performed By: #### CBCDIF, CMP, LIPA ###  
#[facility]   
[address] [place] [address]   
   
                                                    Bilirubin   
[Mass/Vol]      0.4 mg/dL       Normal          0.2-1.3         [facility]  
   
                                                    Comment on   
above:                                  Performed By: #### CBCDIF, CMP, LIPA ###  
#[facility]   
[address] [place] [address]   
   
                                                    Calcium   
[Mass/Vol]      9.2 mg/dL       Normal          8.5-10.2        [facility]  
   
                                                    Comment on   
above:                                  Performed By: #### CBCDIF, CMP, LIPA ###  
#[facility]   
[address] [place] [address]   
   
                                                    Chloride   
[Moles/Vol]     103 mmol/L      Normal                    [facility]  
   
                                                    Comment on   
above:                                  Performed By: #### CBCDIF, CMP, LIPA ###  
#[facility]   
[address] [place] [address]   
   
                      CO2 [Moles/Vol] 22 mmol/L  Normal     22-30      [facility]  
   
                                                    Comment on   
above:                                  Performed By: #### CBCDIF, CMP, LIPA ###  
#[facility]   
[address] [place] [address]   
   
                                                    Creatinine   
[Mass/Vol]      0.63 mg/dL      Normal          0.58-0.96       [facility]  
   
                                                    Comment on   
above:                                  Performed By: #### CBCDIF, CMP, LIPA ###  
#[facility]   
[address] [place] [address]   
   
                                                    eGFR-   
Amer.           >60             Normal                          [facility]  
   
                                                    Comment on   
above:                                  Performed By: #### CBCDIF, CMP, LIPA ###  
#[facility]   
[address] [place] [address]   
   
                                                    eGFR-All Other   
Races           >60             Normal                          [facility]  
   
                                                    Comment on   
above:                                  Result Comment: eGFR (Estimated GFR) Uni  
ts of measure: mL/min/1.73   
meters squared  
eGFR is derived from the reexpressed MDRD Study equation using the   
following parameters: serum creatinine, age, gender and race. The   
creatinine assay has been calibrated to be traceable to IDMS.  
An eGFR <60 mL/min/1.73m2 for >3 months is consistent with chronic   
kidney disease. Refer to KDOQI guidelines for clinical   
interpretation.  
In patients with unstable renal function, e.g. those with acute   
kidney injury, the eGFR may not accurately reflect actual GFR.   
   
                                                            Performed By: #### C  
BCDIF, CMP, LIPA ####[facility]   
[address] [place] [address]   
   
                                                    Glucose   
[Mass/Vol]      88 mg/dL        Normal          74-99           [facility]  
   
                                                    Comment on   
above:                                  Result Comment: The American Diabetes As  
sociation (ADA) provides   
guidance for cutoff values for fasting glucose and random glucose.   
The ADA defines fasting as no caloric intake for at least 8 hours.   
Fasting plasma glucose results between 100 to 125 mg/dL indicate   
increased risk for diabetes (prediabetes).  
Fasting plasma glucose results greater than or equal to 126 mg/dL   
meet the criteria for diagnosis of diabetes. In the absence of   
unequivocal hyperglycemia, results should be confirmed by repeat   
testing. In a patient with classic symptoms of hyperglycemia or   
hyperglycemic crisis, random plasma glucose results greater than or   
equal to 200 mg/dL meet the criteria for diagnosis of diabetes.  
Reference: Standards of Medical Care in Diabetes 2016, American   
Diabetes Association. Diabetes Care. 2016.39(Suppl 1).   
   
                                                            Performed By: #### C  
BCDIF, CMP, LIPA ####[facility]   
[address] [place] [address]   
   
                                                    Potassium   
[Moles/Vol]     3.8 mmol/L      Normal          3.7-5.1         [facility]  
   
                                                    Comment on   
above:                                  Performed By: #### CBCDIF, CMP, LIPA ###  
#[facility]   
[address] [place] [address]   
   
                                                    Protein   
[Mass/Vol]      7.6 g/dL        Normal          6.3-8.0         [facility]  
   
                                                    Comment on   
above:                                  Performed By: #### CBCDIF, CMP, LIPA ###  
#[facility]   
[address] [place] [address]   
   
                                                    Sodium   
[Moles/Vol]     138 mmol/L      Normal          136-144         [facility]  
   
                                                    Comment on   
above:                                  Performed By: #### CBCDIF, CMP, LIPA ###  
#[facility]   
[address] [place] [address]   
   
                                                    Urea nitrogen   
[Mass/Vol]      8 mg/dL         Normal          7-21            [facility]  
   
                                                    Comment on   
above:                                  Performed By: #### CBCDIF, CMP, LIPA ###  
#[facility]   
[address] [place] [address]   
   
                                                    ED NOTEon 10-   
   
                                        ED NOTE             HNO ID: [id number]  
Author: Krys Davis RN  
Service: ?  
Author Type: Registered   
Nurse  
Type: ED Notes  
Filed: 10/9/2021 5:52 PM  
Note Text:  
Pt leaving AMA. Discussed   
risks, pt verbalized   
understanding and  
appropriate paperwork   
signed. Discharge   
instructions reviewed   
with  
patient via teachback. Pt   
verbalizes understanding.   
Pt awake and alert,  
respirations regular and   
unlabored. No further   
questions for this RN. [facility]  
   
                                        ED NOTE             HNO ID: [id number]  
Author: Krys Davis RN  
Service: ?  
Author Type: Registered   
Nurse  
Type: ED Notes  
Filed: 10/9/2021 3:29 PM  
Note Text:  
Pt refusing COVID swab.   
PA notified.        [facility]  
   
                                        ED NOTE             HNO ID: [id number]  
Author: Steph Lind RN  
Service: Nursing  
Author Type: Registered   
Nurse  
Type: ED Notes  
Filed: 10/9/2021 3:13 PM  
Note Text:  
Patient presents to Ed   
with abdominal pain and   
nausea. Patient states   
she  
has had recurrent GI   
issues after having   
gallbladder and appendix   
out 3  
months ago          [facility]  
   
                                                    ED PROV NOTEon 10-   
   
                                        ED PROV NOTE        HNO ID: [id number]  
Author: Khloe Sandoval PA-C  
Service: ?  
Author Type: Physician   
Assistant  
Type: ED Provider Notes  
Filed: 10/9/2021 6:04 PM  
Note Text:  
ED Provider Note  
Patient Name: Deanna Rowell  
MRN: [id number]  
SERVICE DATE: 10/9/21  
History  
No chief complaint on   
file.  
16-year-old female,   
otherwise healthy,   
presents to the ED today   
with  
dizziness, body aches and   
vomiting. This started 2   
days ago. It started  
the morning after she   
started WelChol, 1 dose   
was had. She stopped   
taking  
this at the advice of Dr. Giordano. She was placed on   
this after she had an  
abnormal colonoscopy, she   
saw Dr. Giordano that week   
and reviewing her notes  
it looks as if she spoke   
with some colorectal   
surgeons and they  
recommended a flex   
sigmoid in 6 months. She   
had placed her on WelChol   
but  
told her to stop this   
after she had symptoms.   
She has not had fevers or  
chills. No abdominal pain   
but she has having a   
gnawing feeling in her  
stomach. She states she   
has had some sharp pain   
to her left flank  
intermittently but none   
now. Without diarrhea or   
urinary complaints.  
History provided by:   
Patient and parent  
PAST MEDICAL HISTORY  
Diagnosis Date  
- NEGATIVE MEDICAL   
HISTORY  
PAST SURGICAL HISTORY  
Procedure Laterality Date  
- LAPAROSCOPIC   
CHOLECYSTECTOMY   
2021  
FAMILY HISTORY  
Problem Relation Age of   
Onset  
- other (heart attack)   
Paternal Grandfather  
- Hypertension Other  
maternal side  
Social History  
Tobacco Use  
- Smoking status: Passive   
Smoke Exposure - Never   
Smoker  
- Smokeless tobacco:   
Never Used  
- Tobacco comment:   
smokers outside  
Vaping Use  
- Vaping Use: Never used  
Substance and Sexual   
Activity  
- Alcohol use: No  
- Drug use: No  
- Sexual activity: Never  
ALLERGIES  
No Known Allergies  
Review of Systems  
Constitutional: Negative   
for activity change,   
appetite change, chills   
and  
fever.  
HENT: Negative.  
Eyes: Negative for   
photophobia and visual   
disturbance.  
Respiratory: Negative for   
cough and shortness of   
breath.  
Gastrointestinal:   
Positive for nausea and   
vomiting. Negative for   
abdominal  
pain, constipation and   
diarrhea.  
Genitourinary: Negative   
for difficulty urinating   
and dysuria.  
Musculoskeletal: Negative   
for back pain.  
Skin: Negative for rash   
and wound.  
Neurological: Negative   
for dizziness, weakness,   
light-headedness,   
numbness  
and headaches.  
Hematological: Negative.  
Psychiatric/Behavioral:   
Negative.  
Physical Exam  
Vitals [10/09/21 1504]  
BP Pulse Temp Temp src   
Resp SpO2 Weight Height  
-- -- -- -- -- -- 68 kg   
(150 lb) --  
Physical Exam  
Vitals and nursing note   
reviewed.  
Constitutional:  
General: She is not in   
acute distress.  
Appearance: Normal   
appearance. She is not   
ill-appearing or  
toxic-appearing.  
HENT:  
Head: Normocephalic and   
atraumatic.  
Nose: Nose normal.  
Mouth/Throat:  
Mouth: Mucous membranes   
are moist.  
Pharynx: Oropharynx is   
clear.  
Eyes:  
Extraocular Movements:   
Extraocular movements   
intact.  
Conjunctiva/sclera:   
Conjunctivae normal.  
Cardiovascular:  
Rate and Rhythm: Normal   
rate and regular rhythm.  
Pulmonary:  
Effort: Pulmonary effort   
is normal. No respiratory   
distress.  
Breath sounds: Normal   
breath sounds. No   
wheezing.  
Abdominal:  
General: There is no   
distension.  
Palpations: Abdomen is   
soft.  
Tenderness: There is no   
abdominal tenderness.   
There is no guarding.  
Musculoskeletal:  
General: Normal range of   
motion.  
Cervical back: Normal   
range of motion and neck   
supple.  
Skin:  
General: Skin is warm and   
dry.  
Neurological:  
General: No focal deficit   
present.  
Mental Status: She is   
alert and oriented to   
person, place, and time.  
Cranial Nerves: No   
cranial nerve deficit.  
Coordination: Abnormal   
coordination:  
Psychiatric:  
Mood and Affect: Mood   
normal.  
Diagnostic Testing  
ED Labs Ordered and   
Reviewed  
URINALYSIS - Abnormal;   
Notable for the following   
components:  
Result Value Ref Range  
Ketones, Urine 1+ (*)   
Negative  
Protein, Urine Trace (*)   
Negative  
All other components   
within normal limits  
CBC + DIFF - Abnormal;   
Notable for the following   
components:  
WBC 16.09 (*) 3.70 -   
11.00 k/uL  
Hematocrit 35.5 (*) 36.0   
- 46.0 %  
Abs Neut (ANC) 13.57 (*)   
1.45 - 7.50 k/uL  
Abs Mono 1.19 (*) <0.87   
k/uL  
All other components   
within normal limits  
HCG QUAL UR  
COMP METABOLIC PANEL  
LIPASE BLD  
EXPEDITED COVID19  
CT ABD/PEL W IVCON  
Final Result  
IMPRESSION:  
1. Left adnexa is larger   
than the right and there   
is suggestion of a  
fluid-filled tubular   
structure on the left.   
Pelvic ultrasound may be  
helpful, if clinically   
indicated, to evaluate   
for possible hydrosalpinx  
versus ovarian cyst,   
among other etiologies.  
2. Normal appendix.  
: ABDOUL  
Transcribe Date/Time: Oct   
9 2021 4:35P  
Dictated by : JOSE DE JESUS MARIE DO  
This examination was   
interpreted and the   
report reviewed and  
electronically signed by:  
JOSE DE JESUS MARIE DO on Oct 9   
2021 4:48PM EST  
Procedures  
ED Course / Clinical   
Impression  
Clinical Impressions as   
of Oct 09 1746  
Left upper quadra (more   
content not included)... Normal                                  [facility]  
   
                                                    HCG Qual, Urineon 10-  
   
   
                                                    Beta HCG   
(pregnancy test)   
Ql (U)          Negative        Normal          Negative        [facility]  
   
                                                    Comment on   
above:                                  Result Comment: False positives and fals  
e negatives are rare but have   
been described. Clinical correlation of the findings is recommended.   
   
                                                            Performed By: #### U  
[facility] ####[facility] [address] [place] [phone]   
   
                                                    Lipaseon 10-   
   
                                                    Lipase   
[Catalytic   
activity/Vol]   22 U/L          Normal          16-61           [facility]  
   
                                                    Comment on   
above:                                  Performed By: #### CBCDIF, CMP, LIPA ###  
#[facility]   
[address] [place] [phone]   
   
                                                    Urinalysison 10-   
   
                      Bilirubin, Urine Negative   Normal     Negative   [facility]  
   
                                                    Comment on   
above:                                  Performed By: #### WOODROW [facility] ####[facility]  
ospital [address] [place] [phone]   
   
                      Clarity (U) Clear      Normal     Clear      [facility]  
   
                                                    Comment on   
above:                                  Performed By: #### WOODROW [facility] ####[facility]  
[facility] [address] [place] [address]   
   
                      Color (U)  Yellow     Normal     Yellow     [facility]  
   
                                                    Comment on   
above:                                  Performed By: #### [facility] ####[facility] [address] [place] [address]   
   
                      Glucose Ql (U) Negative   Normal     Negative   [facility]  
   
                                                    Comment on   
above:                                  Performed By: #### [facility] ####[facility]  
[facility] [address] [place] [address]   
   
                                                    Hemoglobin/Blood  
,Ur             Negative        Normal          Negative        [facility]  
   
                                                    Comment on   
above:                                  Performed By: #### [facility] ####[facility]  
[facility] [address] [place] [address]   
   
                                Ketones Ql (U)  1+              Critically   
abnormal                  Negative                  [facility]  
   
                                                    Comment on   
above:                                  Performed By: #### [facility] ####[facility] [address] [place] [address]   
   
                      Leukest    Negative   Normal     Negative   [facility]  
   
                                                    Comment on   
above:                                  Performed By: #### [facility] ####[facility] [address] [place] [address]   
   
                      Nitrite Ql (U) Negative   Normal     Negative   [facility]  
   
                                                    Comment on   
above:                                  Performed By: #### [facility] ####[facility] [address] [place] [address]   
   
                      pH (U)     7.5 [pH]   Normal     5.0-8.0    [facility]  
   
                                                    Comment on   
above:                                  Performed By: #### [facility] ####[facility] [address] [place] [address]   
   
                                Protein, Urine  Trace           Critically   
abnormal                  Negative                  [facility]  
   
                                                    Comment on   
above:                                  Performed By: #### [facility] ####[facility] [address] [place] [address]   
   
                                                    Specific   
Gravity, Ur     1.020           Normal          1.005-1.030     [facility]  
   
                                                    Comment on   
above:                                  Performed By: #### WOODROW [facility] ####[facility] [address] [place] [address]   
   
                                                    Urobilinogen Qn   
(U)             1.0 {Francisco'U}/dL Normal          0.2-1.0         [facility]  
   
                                                    Comment on   
above:                                  Performed By: #### [facility], [facility] ####[facility] [address] [place] [phone]   
   
                                                    ELBOW - LEFTon 10-   
   
                                        ELBOW - LEFT        [facility]  
N[place]   
[place]   
INTERPRETATION Patient   
Name: DEANNA ROWELL Med   
Rec #: [id number]   
DR: FAITH DAVIS PA-C Account #:   
[id number] Date:   
10/21/2017 Age: 12Y   
1MLocation: ER :   
2003Attending DR: DOMINIK RODRIGEZ M.D. Acces #:   
[id number] DR: PCP   
UNKNOWN Order #:   
[id number] DR:   
MD Christopher SierraExam   
Description: ELBOW - LEFT   
EXAMINATION:3 VIEWS OF   
THE LEFT ELBOW10/   
8:28   
pmCOMPARISON:None.HISTORY  
:ELBOW INJURYInitial   
encounter. Acute injury.   
Fall from horse 1 hour   
prior to   
arrival.FINDINGS:There is   
no acute fracture or   
suspect osseous lesion.   
Alignment is normal.Soft   
tissues are unremarkable.   
No joint effusion is   
seen.IMPRESSION:No acute   
osseous abnormality of   
the left   
elbow.###AGB/agbDict:   
10/21/2017 8:48:19   
PMTran: Job#:   
[id number] Name:   
Tonya [facility]LLmelyssaronicCommunity [facility]   
signed by Christopher Sierra MD on 10/21/2017   
8:48:48 PMRADIOLOGY   
INTERPRETATION Page 1 of   
1                   Normal                                  [facility]  
   
                                                    HIP BILATERAL W/PELVISon 10-  
   
   
                                                    HIP BILATERAL   
W/PELVIS                                [place]   
INTERPRETATION Patient   
Name: DEANNA ROWELL Med   
Rec #: [id number]   
DR: FAITH DAVIS PA-C Account #:   
[id number] Date:   
10/21/2017 Age: 12Y   
1MLocation: ER :   
2003Attending DR: DOMINIK RODRIGEZ M.D. Acces #:   
[id number] DR: PCP   
UNKNOWN Order #:   
[id number] DR:   
MD Christopher SierraExam   
Description: HIP   
BILATERAL W/PELVIS   
EXAMINATION:SINGLE VIEW   
OF THE PELVIS AND 2 VIEWS   
OF EACH OF THE BILATERAL   
HIPS10/ 8:28   
pmCOMPARISON:None.HISTORY  
:HIP INJURYInitial   
encounter. Acute injury.   
Fall from horse 1 hour   
prior to   
arrival.FINDINGS:The   
patient is skeletally   
immature. There is no   
acute fracture or   
suspectosseous lesion.   
Alignment of the hips,   
sacroiliac joints, and   
symphysispubis is   
maintained. Femoral head   
articular surfaces are   
smooth and wellseated   
within their respective   
acetabuli. No abnormality   
is seen onfrog-lateral   
views of the left or   
right hip. There is 1 cm   
radiopaqueforeign body   
projecting adjacent to   
the right iliac crest,   
which may beoutside the   
patient.IMPRESSION:No   
acute osseous abnormality   
of the pelvis or left or   
right hip.###AGB/agbDict:   
10/21/2017 8:47:12   
PMTran: Job#:   
[id number] Name:   
Tonya ROWELLectroniccarlos a   
signed by Christopher Sierra MD on 10/21/2017   
8:48:15 PMRADIOLOGY   
INTERPRETATION Page 1 of   
1                   Normal                                  [facility]  
   
                                                    Large Joint Arthro/Inj: L gl  
enohumeral   
   
                                                                  [facility]  
  
  
  
Vital Signs  
  
  
                          Date Time    Vital Sign   Value        Performing   
Clinician                               Facility  
   
                                                    2024   
07:          Body height         165.1 cm            Amalia SPICER.CNP  
Work Phone:   
[phone]                          [facility]  
   
                                                    2024   
07:                              Body mass index   
(BMI) [Ratio]             22.71 kg/m2               Amalia Marquez APRN.CNP  
Work Phone:   
[phone]                          [facility]  
   
                                                    2024   
07:          Body weight         61.9 kg             Amalia Marquez APRN.CNP  
Work Phone:   
[phone]                          [facility]  
   
                                                    2024   
07:                              Diastolic blood   
pressure                  76 mm[Hg]                 Amalia SPICER.CNP  
Work Phone:   
[phone]                          [facility]  
   
                                                    2024   
07:          Heart rate          96 /min             Amalia Marquez APRN.CNP  
Work Phone:   
[phone]                          [facility]  
   
                                                    2024   
07:                              SaO2% (BldA) [Mass   
fraction]                 99 %                      Amalia Dahlhausen   
APRN.CNP  
Work Phone:   
[phone]                          [facility]  
   
                                                    2024   
07:                              Systolic blood   
pressure                  114 mm[Hg]                Amalia Dahlhausen   
APRN.CNP  
Work Phone:   
[phone]                          [facility]  
   
                                                    2024   
13:                              Body mass index   
(BMI) [Ratio]             22.1 kg/m2                Socorro Enid   
APRN.CNP  
Work Phone:   
[phone]                          [facility]  
   
                                                    2024   
13:          Body weight         60.24 kg            Socorro Enid   
APRN.CNP  
Work Phone:   
[phone]                          [facility]  
   
                                                    2024   
13:                              Diastolic blood   
pressure                  58 mm[Hg]                 Socorro Enid   
APRN.CNP  
Work Phone:   
[phone]                          [facility]  
   
                                                    2024   
13:                              Systolic blood   
pressure                  98 mm[Hg]                 Socorro Sally   
APRN.CNP  
Work Phone:   
[phone]                          [facility]  
   
                                                    2024   
13:          Body weight         64.32 kg            Socorro Enid   
APRN.CNP  
Work Phone:   
[phone]                          [facility]  
   
                                                    2024   
13:                              Diastolic blood   
pressure                  64 mm[Hg]                 Socorro Sally   
APRN.CNP  
Work Phone:   
[phone]                          [facility]  
   
                                                    2024   
13:                              Systolic blood   
pressure                  100 mm[Hg]                Socorro Sally   
APRN.CNP  
Work Phone:   
[phone]                          [facility]  
   
                                                    2023   
10:                              Diastolic blood   
pressure                  74 mm[Hg]                 Amalia Dahlhausen   
APRN.CNP  
Work Phone:   
[phone]                          [facility]  
   
                                                    2023   
10:                              Systolic blood   
pressure                  118 mm[Hg]                Amlaia Dahlhausen   
APRN.CNP  
Work Phone:   
[phone]                          [facility]  
   
                                                    10-   
07:          Body height         165.1 cm            Amalia Dahlhausen   
APRN.CNP  
Work Phone:   
[phone]                          [facility]  
   
                                                    10-   
07:          Body weight         64.68 kg            Amalia Knutsonhausen   
APRN.CNP  
Work Phone:   
[phone]                          [facility]  
   
                                                    10-   
07:                              Diastolic blood   
pressure                  79 mm[Hg]                 Amalia Dahlhausen   
APRN.CNP  
Work Phone:   
[phone]                          [facility]  
   
                                                    10-   
07:          Heart rate          82 /min             Amalia Dahlhausen   
APRN.CNP  
Work Phone:   
[phone]                          [facility]  
   
                                                    10-   
07:                              SaO2% (BldA) [Mass   
fraction]                 100 %                     Amalia Dahlhausen   
APRN.CNP  
Work Phone:   
[phone]                          [facility]  
   
                                                    10-   
07:                              Systolic blood   
pressure                  123 mm[Hg]                Amalia Eamonhausen   
APRN.CNP  
Work Phone:   
[phone]                          [facility]  
   
                                                    10-   
11:          Body temperature    98.91 [degF]        Neto Money   
APRN.CNP  
Work Phone:   
[phone]                          [facility]  
   
                                                    10-   
11:          Body weight         65.27 kg            Neto Money   
APRN.CNP  
Work Phone:   
[phone]                          [facility]  
   
                                                    10-   
11:                              Diastolic blood   
pressure                  72 mm[Hg]                 Neto Money   
APRN.CNP  
Work Phone:   
[phone]                          [facility]  
   
                                                    10-   
11:          Heart rate          89 /min             Neto Money   
APRN.CNP  
Work Phone:   
[phone]                          [facility]  
   
                                                    10-   
11:          Respiratory rate    19 /min             Neto Money   
APRN.CNP  
Work Phone:   
[phone]                          [facility]  
   
                                                    10-   
11:                              SaO2% (BldA) [Mass   
fraction]                 98 %                      Neto Money   
APRN.CNP  
Work Phone:   
[phone]                          [facility]  
   
                                                    10-   
11:                              Systolic blood   
pressure                  108 mm[Hg]                Neto Antione ENRIQUEZCNP  
Work Phone:   
[phone]                          [facility]  
   
                                                    2023   
08:          Body temperature    97.4 [degF]         Ashley Sierra  
Work Phone:   
[phone]                          [facility]   
Orthopedics and   
[facility] [address]  
Work Phone:   
[phone]  
   
                                                    2023   
08:          Body temperature    98.2 [degF]         Sydnie Athy PA-C  
Work Phone:   
[phone]                          [facility]  
   
                                                    2023   
08:          Body weight         66.04 kg            Sydnie Athy PA-C  
Work Phone:   
[phone]                          [facility]  
   
                                                    2023   
08:                              Diastolic blood   
pressure                  70 mm[Hg]                 Sydnie Athy PA-C  
Work Phone:   
[phone]                          [facility]  
   
                                                    2023   
08:          Heart rate          85 /min             Sydnie Athy PA-C  
Work Phone:   
[phone]                          [facility]  
   
                                                    2023   
08:          Respiratory rate    20 /min             Sydnie Athy PA-C  
Work Phone:   
[phone]                          [facility]  
   
                                                    2023   
08:                              SaO2% (BldA) [Mass   
fraction]                 99 %                      Sydnie Athy PA-C  
Work Phone:   
[phone]                          [facility]  
   
                                                    2023   
08:                              Systolic blood   
pressure                  118 mm[Hg]                Sydnie Athy PA-C  
Work Phone:   
[phone]                          [facility]  
   
                                                    2023   
15:          Body temperature    97.7 [degF]         Ashley Sierra  
Work Phone:   
[phone]                          [facility]   
Orthopedics and   
Sports Medicine [address]  
Work Phone:   
[phone]  
   
                                                    2023   
15:          Body weight         66.23 kg            Ashley Sierra  
Work Phone:   
[phone]                          [facility]   
Orthopedics and   
Sports Medicine [address]  
Work Phone:   
[phone]  
   
                                                    2023   
15:                              [address] 1                Ashley Sierra  
Work Phone:   
[phone]                          [facility]   
Orthopedics and   
Sports Medicine [address]  
Work Phone:   
[phone]  
   
                                                    Comment on   
above:                                  2-20_WPerc   
   
                                                    2023   
11:                              Diastolic blood   
pressure            88 mm[Hg]           Ashley Sierra         [facility]  
   
                                                    2023   
11:      Heart rate      80 /min         Ashley Sierra     [facility]  
   
                                                    2023   
11:      Respiratory rate 17 /min         Ashley Sierra     [facility]  
   
                                                    2023   
11:                              SaO2% (BldA) [Mass   
fraction]           100 %               [facility]  
   
                                                    2023   
11:                              Systolic blood   
pressure            128 mm[Hg]          Ashley Sierra         [facility]  
   
                                                    2023   
08:      Body height     165.1 cm        [facility]  
   
                                                    2023   
08:      Body temperature 98.06 [degF]    [facility]  
   
                                                    2023   
08:      Body weight     63.6 kg         [facility]  
   
                                                    2023   
10:          Body temperature    98.2 [degF]         Zackary Pendlerey   
APRN.CNP  
Work Phone:   
[phone]                          [facility]  
   
                                                    2023   
10:          Body weight         66.04 kg            Zackary Trivedi   
APRN.CNP  
Work Phone:   
[phone]                          [facility]  
   
                                                    2023   
10:                              Diastolic blood   
pressure                  76 mm[Hg]                 Zackary Pendlebury   
APRN.CNP  
Work Phone:   
[phone]                          [facility]  
   
                                                    2023   
10:          Heart rate          80 /min             Zackary Pendlebury   
APRN.CNP  
Work Phone:   
[phone]                          [facility]  
   
                                                    2023   
10:          Respiratory rate    16 /min             Zackary Pendlebury   
APRN.CNP  
Work Phone:   
[phone]                          [facility]  
   
                                                    2023   
10:                              Systolic blood   
pressure                  130 mm[Hg]                Zackary Trivedi   
APRCARLOS.CNP  
Work Phone:   
[phone]                          [facility]  
   
                                                    2023   
12:          Body temperature    98.6 [degF]         Michelle SPICER.CNP  
Work Phone:   
[phone]                          [facility]  
   
                                                    2023   
12:          Body weight         66.68 kg            Michelle SPICER.CNP  
Work Phone:   
[phone]                          [facility]  
   
                                                    2023   
12:                              Diastolic blood   
pressure                  82 mm[Hg]                 Michelle SPICER.CNP  
Work Phone:   
[phone]                          [facility]  
   
                                                    2023   
12:          Heart rate          83 /min             Michelle SPICER.CNP  
Work Phone:   
[phone]                          [facility]  
   
                                                    2023   
12:          Respiratory rate    18 /min             Michelle SPICER.CNP  
Work Phone:   
[phone]                          [facility]  
   
                                                    2023   
12:                              SaO2% (BldA) [Mass   
fraction]                 99 %                      Michelle SPICER.CNP  
Work Phone:   
[phone]                          [facility]  
   
                                                    2023   
12:                              Systolic blood   
pressure                  124 mm[Hg]                Michelle SPICER.CNP  
Work Phone:   
[phone]                          [facility]  
   
                                                    2023   
10:          Body temperature    99 [degF]           Lionel Garcia MD  
Work Phone:   
[phone]                          [facility]  
   
                                                    2023   
10:          Body weight         66.5 kg             Lionel Garcia MD  
Work Phone:   
[phone]                          [facility]  
   
                                                    2023   
10:                              Diastolic blood   
pressure                  82 mm[Hg]                 Lionel Garcia MD  
Work Phone:   
[phone]                          [facility]  
   
                                                    2023   
10:          Heart rate          80 /min             Lionel Garcia MD  
Work Phone:   
[phone]                          [facility]  
   
                                                    2023   
10:          Respiratory rate    21 /min             Lionel Garcia MD  
Work Phone:   
[phone]                          [facility]  
   
                                                    2023   
10:                              SaO2% (BldA) [Mass   
fraction]                 100 %                     Lionel Garcia MD  
Work Phone:   
[phone]                          [facility]  
   
                                                    2023   
10:                              Systolic blood   
pressure                  118 mm[Hg]                Lionel Garcia MD  
Work Phone:   
[phone]                          [facility]  
   
                                                    2023   
13:          Body temperature    98.8 [degF]         Ely Tabitha   
APRN.CNP  
Work Phone:   
[phone]                          [facility]  
   
                                                    2023   
13:          Body weight         67.13 kg            Ely Tabtiha   
APRN.CNP  
Work Phone:   
[phone]                          [facility]  
   
                                                    2023   
13:                              Diastolic blood   
pressure                  78 mm[Hg]                 Ely Tabitha   
APRN.CNP  
Work Phone:   
[phone]                          [facility]  
   
                                                    2023   
13:          Heart rate          91 /min             Ely Tabitha   
APRN.CNP  
Work Phone:   
[phone]                          [facility]  
   
                                                    2023   
13:          Respiratory rate    16 /min             Ely Tabitha   
APRN.CNP  
Work Phone:   
[phone]                          [facility]  
   
                                                    2023   
13:                              SaO2% (BldA) [Mass   
fraction]                 97 %                      Ely Tabitha   
APRN.CNP  
Work Phone:   
[phone]                          [facility]  
   
                                                    2023   
13:                              Systolic blood   
pressure                  100 mm[Hg]                Ely Tabitha   
APRN.CNP  
Work Phone:   
[phone]                          [facility]  
   
                                                    2023   
10:          Body height         165.1 cm            Bijan Reeves MD  
Work Phone:   
[phone]                          [facility]  
   
                                                    2023   
10:          Body temperature    97.7 [degF]         Bijan Reeves MD  
Work Phone:   
[phone]                          [facility]  
   
                                                    2023   
10:          Body weight         66.32 kg            Bijan Reeves MD  
Work Phone:   
[phone]                          [facility]  
   
                                                    2023   
10:                              Diastolic blood   
pressure                  61 mm[Hg]                 Bijan Reeves MD  
Work Phone:   
[phone]                          [facility]  
   
                                                    2023   
10:          Heart rate          77 /min             Bijan Reeves MD  
Work Phone:   
[phone]                          [facility]  
   
                                                    2023   
10:                              SaO2% (BldA) [Mass   
fraction]                 98 %                      Bijan Reeves MD  
Work Phone:   
[phone]                          [facility]  
   
                                                    2023   
10:                              Systolic blood   
pressure                  106 mm[Hg]                Bijan Reeves MD  
Work Phone:   
[phone]                          [facility]  
   
                                                    2023   
16:          Body temperature    99.61 [degF]        Ely Tabitha   
APRN.CNP  
Work Phone:   
[phone]                          [facility]  
   
                                                    2023   
16:          Body weight         66.22 kg            Ely Tabitha   
APRN.CNP  
Work Phone:   
[phone]                          [facility]  
   
                                                    2023   
16:                              Diastolic blood   
pressure                  68 mm[Hg]                 Eyl Tabitha   
APRN.CNP  
Work Phone:   
[phone]                          [facility]  
   
                                                    2023   
16:          Heart rate          112 /min            Ely Tabitha   
APRN.CNP  
Work Phone:   
[phone]                          [facility]  
   
                                                    2023   
16:          Respiratory rate    16 /min             Ely Tabitha   
APRN.CNP  
Work Phone:   
[phone]                          [facility]  
   
                                                    2023   
16:                              SaO2% (BldA) [Mass   
fraction]                 97 %                      Ely Tabitha   
APRN.CNP  
Work Phone:   
[phone]                          [facility]  
   
                                                    2023   
16:                              Systolic blood   
pressure                  118 mm[Hg]                Ely Tabitha   
APRN.CNP  
Work Phone:   
[phone]                          [facility]  
   
                                                    04-   
17:          Body temperature    98.8 [degF]         Sydnie Mccormick PA-C  
Work Phone:   
[phone]                          [facility]  
   
                                                    04-   
17:          Body weight         66.22 kg            Sydnie Athy PA-C  
Work Phone:   
[phone]                          [facility]  
   
                                                    04-   
17:                              Diastolic blood   
pressure                  60 mm[Hg]                 Sydnie Athy PA-C  
Work Phone:   
[phone]                          [facility]  
   
                                                    04-   
17:          Heart rate          110 /min            Sydnie Athy PA-C  
Work Phone:   
[phone]                          [facility]  
   
                                                    04-   
17:          Respiratory rate    18 /min             Sydnie Athy PA-C  
Work Phone:   
[phone]                          [facility]  
   
                                                    04-   
17:                              SaO2% (BldA) [Mass   
fraction]                 99 %                      Sydnie Athy PA-C  
Work Phone:   
[phone]                          [facility]  
   
                                                    04-   
17:                              Systolic blood   
pressure                  122 mm[Hg]                Sydnie Athy PA-C  
Work Phone:   
[phone]                          [facility]  
   
                                                    2023   
10:          Body temperature    98.2 [degF]         Neto Money   
APRN.CNP  
Work Phone:   
[phone]                          [facility]  
   
                                                    2023   
10:          Body weight         67.59 kg            Neto Money   
APRN.CNP  
Work Phone:   
[phone]                          [facility]  
   
                                                    2023   
10:                              Diastolic blood   
pressure                  64 mm[Hg]                 Neto Money   
APRN.CNP  
Work Phone:   
[phone]                          [facility]  
   
                                                    2023   
10:          Heart rate          100 /min            Neto Money   
APRN.CNP  
Work Phone:   
[phone]                          [facility]  
   
                                                    2023   
10:          Respiratory rate    16 /min             Neto Money   
APRN.CNP  
Work Phone:   
[phone]                          [facility]  
   
                                                    2023   
10:                              Systolic blood   
pressure                  110 mm[Hg]                Neto Money   
APRN.CNP  
Work Phone:   
[phone]                          [facility]  
   
                                                    2023   
14:          Body temperature    98.8 [degF]         Kimberly Denbow PA-C  
Work Phone:   
[phone]                          [facility]  
   
                                                    2023   
14:          Body weight         64.86 kg            Kimberly Denbow PA-C  
Work Phone:   
[phone]                          [facility]  
   
                                                    2023   
14:                              Diastolic blood   
pressure                  72 mm[Hg]                 Kimberly Denbow PA-C  
Work Phone:   
[phone]                          [facility]  
   
                                                    2023   
14:          Heart rate          88 /min             Kimberly Denbow PA-C  
Work Phone:   
[phone]                          [facility]  
   
                                                    2023   
14:          Respiratory rate    16 /min             Kimberly Denbow PA-C  
Work Phone:   
[phone]                          [facility]  
   
                                                    2023   
14:                              SaO2% (BldA) [Mass   
fraction]                 98 %                      Kimberly Denbow PA-C  
Work Phone:   
[phone]                          [facility]  
   
                                                    2023   
14:                              Systolic blood   
pressure                  110 mm[Hg]                Kimberly Denbow PA-C  
Work Phone:   
[phone]                          [facility]  
   
                                                    2023   
09:          Body height         165.1 cm            Bijan Reeves MD  
Work Phone:   
[phone]                          [facility]  
   
                                                    2023   
09:          Body temperature    97.81 [degF]        Bijan Reeves MD  
Work Phone:   
[phone]                          [facility]  
   
                                                    2023   
09:          Body weight         63.78 kg            Bijan Reeves MD  
Work Phone:   
[phone]                          [facility]  
   
                                                    2023   
09:                              Diastolic blood   
pressure                  66 mm[Hg]                 Bijan Reeves MD  
Work Phone:   
[phone]                          [facility]  
   
                                                    2023   
09:          Heart rate          91 /min             Bijan Reeves MD  
Work Phone:   
[phone]                          [facility]  
   
                                                    2023   
09:                              SaO2% (BldA) [Mass   
fraction]                 98 %                      Bijan Reeves MD  
Work Phone:   
[phone]                          [facility]  
   
                                                    2023   
09:                              Systolic blood   
pressure                  101 mm[Hg]                Bijan Reeves MD  
Work Phone:   
[phone]                          [facility]  
   
                                                    2023   
14:          Body temperature    98.71 [degF]        Delmis Ball   
APRN.CNP  
Work Phone:   
[phone]                          [facility]  
   
                                                    2023   
14:          Body weight         65.41 kg            Delmis Ball   
APRN.CNP  
Work Phone:   
[phone]                          [facility]  
   
                                                    2023   
14:                              Diastolic blood   
pressure                  78 mm[Hg]                 Delmis Ball   
APRN.CNP  
Work Phone:   
[phone]                          [facility]  
   
                                                    2023   
14:          Heart rate          80 /min             Delmis Ball   
APRN.CNP  
Work Phone:   
[phone]                          [facility]  
   
                                                    2023   
14:          Respiratory rate    18 /min             Delmis Ball   
APRN.CNP  
Work Phone:   
[phone]                          [facility]  
   
                                                    2023   
14:                              SaO2% (BldA) [Mass   
fraction]                 100 %                     Delmis Ball   
APRN.CNP  
Work Phone:   
[phone]                          [facility]  
   
                                                    2023   
14:                              Systolic blood   
pressure                  122 mm[Hg]                Delmis Ball   
APRN.CNP  
Work Phone:   
[phone]                          [facility]  
   
                                                    2022   
15:          Body temperature    99 [degF]           Neto Money   
APRN.CNP  
Work Phone:   
[phone]                          [facility]  
   
                                                    2022   
15:          Body weight         65.77 kg            Neto Money   
APRN.CNP  
Work Phone:   
[phone]                          [facility]  
   
                                                    2022   
15:                              Diastolic blood   
pressure                  68 mm[Hg]                 Neto Money   
APRN.CNP  
Work Phone:   
[phone]                          [facility]  
   
                                                    2022   
15:          Heart rate          100 /min            Neto Money   
APRN.CNP  
Work Phone:   
[phone]                          [facility]  
   
                                                    2022   
15:          Respiratory rate    16 /min             Neto Talbot   
APRN.CNP  
Work Phone:   
[phone]                          [facility]  
   
                                                    2022   
15:                              Systolic blood   
pressure                  114 mm[Hg]                Neto Talbot   
APRN.CNP  
Work Phone:   
[phone]                          [facility]  
   
                                                    08-   
06:          Body weight         66.68 kg            Socorro Sally   
APRN.CNP  
Work Phone:   
[phone]                          [facility]  
   
                                                    08-   
06:                              Diastolic blood   
pressure                  62 mm[Hg]                 Socorro Enid   
APRN.CNP  
Work Phone:   
[phone]                          [facility]  
   
                                                    08-   
06:                              Systolic blood   
pressure                  108 mm[Hg]                Socorro Enid   
APRN.CNP  
Work Phone:   
[phone]                          [facility]  
   
                                                    2022   
14:          Body temperature    98.91 [degF]        Lionel Garcia MD  
Work Phone:   
[phone]                          [facility]  
   
                                                    2022   
14:          Body weight         68.95 kg            Lionel Garcia MD  
Work Phone:   
[phone]                          [facility]  
   
                                                    2022   
14:                              Diastolic blood   
pressure                  102 mm[Hg]                Lionel Garcia MD  
Work Phone:   
[phone]                          [facility]  
   
                                                    2022   
14:          Heart rate          70 /min             Lionel Garcia MD  
Work Phone:   
[phone]                          [facility]  
   
                                                    2022   
14:          Respiratory rate    21 /min             Lionel Garcia MD  
Work Phone:   
[phone]                          [facility]  
   
                                                    2022   
14:                              SaO2% (BldA) [Mass   
fraction]                 98 %                      Lionel Garcia MD  
Work Phone:   
[phone]                          [facility]  
   
                                                    2022   
14:                              Systolic blood   
pressure                  142 mm[Hg]                Lionel Garcia MD  
Work Phone:   
[phone]                          [facility]  
   
                                                    2022   
16:          Body temperature    99 [degF]           Alber Nicholson   
APRN.CNP  
Work Phone:   
[phone]                          [facility]  
   
                                                    2022   
16:          Body weight         68.77 kg            Alber Nicholson   
APRN.CNP  
Work Phone:   
[phone]                          [facility]  
   
                                                    2022   
16:                              Diastolic blood   
pressure                  78 mm[Hg]                 Alber Nicholson   
APRN.CNP  
Work Phone:   
[phone]                          [facility]  
   
                                                    2022   
16:          Heart rate          72 /min             Alber Nicholson   
APRN.CNP  
Work Phone:   
[phone]                          [facility]  
   
                                                    2022   
16:          Respiratory rate    18 /min             Alber Nicholson   
APRN.CNP  
Work Phone:   
[phone]                          [facility]  
   
                                                    2022   
16:                              Systolic blood   
pressure                  126 mm[Hg]                Alber Nicholson   
APRN.CNP  
Work Phone:   
[phone]                          [facility]  
   
                                                    2022   
09:          Body temperature    96.49 [degF]        Sydnie Athy PA-C  
Work Phone:   
[phone]                          [facility]  
   
                                                    2022   
09:          Body weight         69.31 kg            Sydnie Athy PA-C  
Work Phone:   
[phone]                          [facility]  
   
                                                    2022   
09:                              Diastolic blood   
pressure                  76 mm[Hg]                 Sydnie Athy PA-C  
Work Phone:   
[phone]                          [facility]  
   
                                                    2022   
09:          Heart rate          115 /min            Sydnie Athy PA-C  
Work Phone:   
[phone]                          [facility]  
   
                                                    2022   
09:          Respiratory rate    18 /min             Sydnie Athy PA-C  
Work Phone:   
[phone]                          [facility]  
   
                                                    2022   
09:                              SaO2% (BldA) [Mass   
fraction]                 99 %                      Sydnie Athy PA-C  
Work Phone:   
[phone]                          [facility]  
   
                                                    2022   
09:                              Systolic blood   
pressure                  120 mm[Hg]                Sydnie Athy PA-C  
Work Phone:   
[phone]                          [facility]  
   
                                                    2022   
10:          Body weight         69.85 kg            Socorro Enid   
APRN.CNP  
Work Phone:   
[phone]                          [facility]  
   
                                                    2022   
10:                              Diastolic blood   
pressure                  76 mm[Hg]                 Socorro Sally   
APRN.CNP  
Work Phone:   
[phone]                          [facility]  
   
                                                    2022   
10:                              Systolic blood   
pressure                  110 mm[Hg]                Socorro Enid   
APRN.CNP  
Work Phone:   
[phone]                          [facility]  
  
  
  
Encounters  
  
  
                          Encounter Date Encounter Type Care Provider Facility  
   
                                                    Start: 12-  
End: 12-           Refill                    Amalia Dahlhausen   
APRN.CNP  
Work Phone:   
[phone]                          Neurology  
   
                                        Comment on above:   Refill Request   
   
                                                    Start: 2024  
End: 2024           Refill                    Amalia Dahlhausen   
APRN.CNP  
Work Phone:   
[phone]                          Neurology  
   
                                        Comment on above:   Refill Request   
   
                                                    Start: 2024  
End: 2024     ambulatory          ASHLEY SIERRA       Facility:[facility]  
   
                                                    Start: 2024  
End: 2024           Telephone encounter       Amalia Marquez   
APRN.CNP  
Work Phone:   
[phone]                          Neurology  
   
                                        Comment on above:   Insurance Authorizat  
ion (Received insurance authorization   
approval   
from [facility] for MRI/CAT Scan. //Start date: 2024/End   
date:2024//Reference #[id number]/)   
   
                                                    Start: 2024  
End: 2024     ambulatory          AMALIA DAFELICITASHAUSEN  Facility:[facility]  
   
                                                    Start: 2024  
End: 2024                         Subsequent hospital   
visit by physician                      [facility]   
(I-Stat/1.5t)  
Work Phone:   
[phone]                          Radiology  
   
                                        Comment on above:   Migraine with aura a  
nd without status migrainosus, not   
intractable   
[G43.109]   
   
                                                    Start: 2024  
End: 2024     ambulatory          AMALIA DAGARYEN  Facility:[facility]  
   
                                                    Start: 2024  
End: 2024                         Patient encounter   
procedure                               Amaliaderek Marquez   
APRN.CNP  
Work Phone:   
[phone]                          Neurology  
   
                                        Comment on above:   Migraine with aura a  
nd without status migrainosus, not   
intractable   
(Primary Dx);  
New onset headache   
   
                                                    Start: 2024  
End: 2024     ambulatory          ASHLEY SIERRA       Facility:[facility]  
   
                                                    Start: 2024  
End: 2024                         Patient encounter   
procedure                               Socorro Enid   
APRN.CNP  
Work Phone:   
[phone]                          OB/Gynecology  
   
                                        Comment on above:   Folliculitis (Primar  
y Dx);  
Dysuria   
   
                                Start: 2024 ambulatory      Bijan Reeves MD  
Work Phone:   
[phone]                          Gastroenterology  
   
                                        Comment on above:   Question regarding s  
tomach pain/pressure   
   
                                Start: 2024 Refill          Socorro Sally   
APRN.CNP  
Work Phone:   
[phone]                          OB/Gynecology  
   
                                        Comment on above:   Refill Request   
   
                                Start: 2024 Telephone encounter Jose mendoza   
APRN.CNP  
Work Phone:   
[phone]                          OB/Gynecology  
   
                                        Comment on above:   Results   
   
                                                    Start: 2024  
End: 2024     ambulatory          ASHLEY SIERRA       Facility:[facility]  
   
                                                    Start: 2024  
End: 2024                         Patient encounter   
procedure                               Socorro Sally   
APRN.CNP  
Work Phone:   
[phone]                          OB/Gynecology  
   
                                        Comment on above:   Pelvic pain in femal  
e (Primary Dx)   
   
                                Start: 2024 Telephone encounter Socorro Cantuc  
connor   
APRN.CNP  
Work Phone:   
[phone]                          OB/Gynecology  
   
                                        Comment on above:   Results   
   
                                                    Start: 2024  
End: 2024     ambulatory          ASHLEY SIERRA       Facility:[facility]  
   
                                                    Start: 2024  
End: 2024                         Subsequent hospital   
visit by physician                      [facility] [address]  
Work Phone:   
[phone]                          Radiology  
   
                                        Comment on above:   Pelvic pain in femal  
e [R10.2]   
   
                                                    Start: 2024  
End: 2024     ambulatory          ASHLEY SIERRA       Facility:[facility]  
   
                                                    Start: 2023  
End: 2023     ambulatory          ASHLEY SIERRA       Facility:[facility]  
   
                                                    Start: 2023  
End: 2023                         Patient encounter   
procedure                               Amalia ENRIQUEZCNP  
Work Phone:   
[phone]                          Neurology  
   
                                        Comment on above:   Injury of head, subs  
equent encounter (Primary Dx);  
History of concussion;  
Cervical pain;  
Dizziness;  
Subjective visual disturbance   
   
                                                    Start: 10-  
End: 10-     ambulatory          AMALIA MARQUEZ  Facility:[facility]  
   
                                                    Start: 10-  
End: 10-                         Subsequent hospital   
visit by physician                      [facility]   
(I-Stat)  
Work Phone:   
[phone]                          Cat Scan  
   
                                        Comment on above:   History of concussio  
n [Z87.820]   
   
                                Start: 10- Telephone encounter Amalia GarciaCNP  
Work Phone:   
[phone]                          Neurology  
   
                                        Comment on above:   Insurance Authorizat  
ion (MRI/CT)   
   
                                                    Start: 10-  
End: 10-     [facility]        Facility:[facility]  
   
                                                    Start: 10-  
End: 10-                         Patient encounter   
procedure                               Amalia Marquez   
APRN.CNP  
Work Phone:   
[phone]                          Neurology  
   
                                        Comment on above:   Injury of head, subs  
equent encounter (Primary Dx);  
History of concussion;  
Cervical pain;  
Dizziness;  
Subjective visual disturbance   
   
                                                    Start: 10-  
End: 10-                         Patient encounter   
procedure                               Neto Money   
APRN.CNP  
Work Phone:   
[phone]                          [facility] [place]  
   
                                        Comment on above:   Procedure not robert  
d out (Primary Dx)   
   
                          Start: 10- ambulatory   NETO TALBOT Facility:[facility]  
   
                                                    Start: 10-  
End: 10-     ambulatory          [facility]        Facility:[facility]  
   
                                                    Start: 10-  
End: 10-                         Patient encounter   
procedure                               Neto Money   
APRN.CNP  
Work Phone:   
[phone]                          [facility] [place]  
   
                                        Comment on above:   History of concussio  
n (Primary Dx)   
   
                          Start: 2023 ambulatory   [facility]  
:[id number]  
   
                          Start: 2023 ambulatory   [facility]  
:[id number]  
   
                                Start: 2023 Chart Update    Ashley Sierra  
Work Phone:   
[phone]                          [facility]   
[facility] [address]  
Work Phone:   
[phone]  
   
                                        Start: 2023   Office outpatient vi  
sit   
15 minutes                              Ashley Sierra  
Work Phone:   
[phone]                          [facility]   
[facility] [address]  
Work Phone:   
[phone]  
   
                          Start: 2023 ambulatory   [facility]  
:[id number]  
   
                                Start: 2023 Telephone encounter Sydnie mendoza PA-C  
Work Phone:   
[phone]                          [facility]  
   
                                        Comment on above:   Results   
   
                                                    Start: 2023  
End: 2023                         Patient encounter   
procedure                               Sydnie Mccormick PA-C  
Work Phone:   
[phone]                          [facility]  
   
                                        Comment on above:   Recurrent UTI (Prima  
ry Dx)   
   
                                        Start: 2023   Office outpatient ne  
w   
45 minutes                              Ashley Sierra  
Work Phone:   
[phone]                          [facility]   
[facility] [address]  
Work Phone:   
[phone]  
   
                                        Start: 2023   Patient encounter   
procedure                               Ashley Sierra  
Work Phone:   
[phone]                          [facility]   
[facility] [address]  
Work Phone:   
[phone]  
   
                          Start: 2023 ambulatory   [facility]  
:[id number]  
   
                                                    Start: 2023  
End: 2023                         Emergency department   
patient visit             Александр Matt        [facility] 01  
   
                                                    Start: 2023  
End: 2023                         Office outpatient visit   
25 minutes                              Zackary Trivedi APRN.CNP  
Work Phone:   
[phone]                          [facility]  
   
                                        Comment on above:   Burning with urinati  
on (Primary Dx)   
   
                                                    Start: 2023  
End: 2023                         Patient encounter   
procedure                               Grisel Cardenas   
PA-C  
Work Phone:   
[phone]                          Orthopaedics  
   
                                        Comment on above:   Biceps tendonitis on  
 left (Primary Dx)   
   
                                                    Start: 2023  
End: 2023                         Subsequent hospital   
visit by physician                      [facility]  
Work Phone:   
[phone]                          Radiology  
   
                                        Comment on above:   Pain [R52]   
   
                                                    Start: 2023  
End: 2023                         Patient encounter   
procedure                               Michelle Velasquez APRN.CNP  
Work Phone:   
[phone]                          [facility]  
   
                                        Comment on above:   Pain (Primary Dx)   
   
                                                    Start: 2023  
End: 2023                         Patient encounter   
procedure                               Lionel Garcia MD  
Work Phone:   
[phone]                          [facility]  
   
                                        Comment on above:   Sore throat (Primary  
 Dx)   
   
                                Start: 05- ambulatory      Ashley Sierra DO  
Work Phone:   
[phone]                          [facility]  
   
                                        Comment on above:   Nurse Triage Call   
   
                                                    Start: 2023  
End: 2023                         Patient encounter   
procedure                               Ely SPICER.CNP  
Work Phone:   
[phone]                          [facility]  
   
                                        Comment on above:   Strep pharyngitis (P  
rimary Dx);  
Sore throat   
   
                                                    Start: 2023  
End: 2023                         Patient encounter   
procedure                               Bijan Reeves MD  
Work Phone:   
[phone]                          Gastroenterology  
   
                                        Comment on above:   Irritable bowel synd  
higinio with diarrhea (Primary Dx);  
Nausea;  
Dairy product intolerance;  
Generalized anxiety disorder with panic attacks   
   
                                                    Start: 2023  
End: 2023                         Patient encounter   
procedure                               Ely SPICER.CNP  
Work Phone:   
[phone]                          [facility]  
   
                                        Comment on above:   Plaque, tongue (Prim  
milka Dx);  
Strep throat;  
Rash   
   
                                Start: 2023 ambulatory      Ashley Sierra DO  
Work Phone:   
[phone]                          [facility]  
   
                                        Comment on above:   Deanna Rowell   
   
                                                    Start: 04-  
End: 04-           Refill                    Socorro ENRIQUEZCNP  
Work Phone:   
[phone]                          OB/Gynecology  
   
                                        Comment on above:   Refill Request   
   
                                                            Strep pharyngitis (P  
rimary Dx)   
   
                                                    Start: 2023  
End: 2023                         Patient encounter   
procedure                               Neto SPICER.CNP  
Work Phone:   
[phone]                          [facility]  
   
                                        Comment on above:   Anxiety (Primary Dx)  
   
   
                                Start: 2023 ambulatory      Ashley Sierra   
DO  
Work Phone:   
[phone]                          [facility]  
   
                                        Comment on above:   Nurse Triage Call   
   
                                Start: 2023 Telephone encounter Bijan Reeves MD  
Work Phone:   
[phone]                          Gastroenterology  
   
                                        Comment on above:   Medication Authoriza  
tion (vancomycin (VANCOCIN) 125 mg   
capsule)   
   
                                                    Start: 2023  
End: 2023                         Subsequent hospital   
visit by physician                      [facility]   
(I-Stat)                                Radiology  
   
                                        Comment on above:   Vomiting without thomas  
sea, unspecified vomiting type [R11.11]   
   
                                                    Start: 2023  
End: 2023                         Patient encounter   
procedure                               Kimberly Myers PA-C  
Work Phone:   
[phone]                          [facility]  
   
                                        Comment on above:   Otitis media with ef  
fusion, right (Primary Dx);  
Impacted cerumen of right ear   
   
                                                    Start: 2023  
End: 2023                         Patient encounter   
procedure                               Bijan Reeves MD  
Work Phone:   
[phone]                          Gastroenterology  
   
                                        Comment on above:   Generalized abdomina  
l pain (Primary Dx);  
Vomiting without nausea, unspecified vomiting type;  
Abnormal CT of the abdomen;  
Anxiety;  
Chronic diarrhea   
   
                                                    Start: 2023  
End: 2023                         Office outpatient visit   
15 minutes                              Delmis SPICER.CNP  
Work Phone:   
[phone]                          [facility]  
   
                                        Comment on above:   Nausea and vomiting,  
 unspecified vomiting type (Primary Dx);  
Headache, unspecified headache type   
   
                                                    Start: 2022  
End: 2022                         Patient encounter   
procedure                               Neto SPICER.CNP  
Work Phone:   
[phone]                          [facility]  
   
                                        Comment on above:   Vomiting without thomas  
sea, unspecified vomiting type (Primary   
Dx)   
   
                                                    Start: 2022  
End: 2022                         Patient encounter   
procedure                               Alber SPICER.CNP  
Work Phone:   
[phone]                          [facility]  
   
                                        Comment on above:   Chest pressure (Prim  
milka Dx)   
   
                                                    Start: 2022  
End: 2022                         Emergency department   
patient visit             ASHLEY SIERRA             Facility:[facility]  
   
                                Start: 2022 ambulatory      Ashley Sierra   
DO  
Work Phone:   
[phone]                          [facility]  
   
                                        Comment on above:   Nurse Triage Call   
   
                                Start: 2022 ambulatory      Ashley Sierra   
DO  
Work Phone:   
[phone]                          [facility] [place]  
   
                                        Comment on above:   Patient Question   
   
                                                    Start: 08-  
End: 08-                         Patient encounter   
procedure                               Socorro Zavala   
APRN.CNP  
Work Phone:   
[phone]                          OB/Gynecology  
   
                                        Comment on above:   Vulvar abscess (Prim  
milka Dx)   
   
                                                    Start: 2022  
End: 2022                         Patient encounter   
procedure                               Lionel Garcia MD  
Work Phone:   
[phone]                          [facility]  
   
                                        Comment on above:   Avulsion of fingerna  
il, subsequent encounter (Primary Dx)   
   
                                Start: 2022 Telephone encounter Regine Potter   
APRN.CNP  
Work Phone:   
[phone]                          [facility]  
   
                                        Comment on above:   Patient Question   
   
                                                    Start: 2022  
End: 2022                         Patient encounter   
procedure                               Alber Nicholson   
APRN.CNP  
Work Phone:   
[phone]                          [facility]  
   
                                        Comment on above:   Finger infection (Pr  
imary Dx)   
   
                                Start: 2022 Telephone encounter Lionel Fowler MD  
Work Phone:   
[phone]                          [facility]  
   
                                        Comment on above:   Results (Candida )   
   
                                                    Start: 2022  
End: 2022                         Patient encounter   
procedure                               Sydnie Mccormick PA-C  
Work Phone:   
[phone]                          [facility]  
   
                                        Comment on above:   Urinary frequency (P  
rimary Dx);  
Vaginal discharge   
   
                                Start: 2022 Telephone encounter Kristi goncalves   
APRN.CNM  
Work Phone:   
[phone]                          OB/Gynecology  
   
                                        Comment on above:   Results   
   
                                                    Start: 2022  
End: 2022                         Patient encounter   
procedure                               Socorro Zavala   
APRN.CNP  
Work Phone:   
[phone]                          OB/Gynecology  
   
                                        Comment on above:   Screen for STD (sexu  
ally transmitted disease) (Primary Dx)   
   
                                                    Start: 10-  
End: 10-                         Emergency department   
patient visit             [facility]  
   
                                                    Start: 10-  
End: 10-     Ambulatory          PCP UNKNOWN         [facility]  
nter  
  
  
  
Procedures  
  
  
                          Date         Procedure    Procedure Detail Performing   
Clinician  
   
                                        Start: 2024   Mri brain brain stem  
 w/o   
w/contrast material                                 Amalia Marquez   
APRN.CNP  
Work Phone:   
[phone]  
   
                          Start: 2024 Us transvaginal              Socorro M  
etcalf APRN.CNP  
Work Phone:   
[phone]  
   
                                        Start: 10-   Ct angiography neck   
w/contrast/noncontrast                              Amalia Marquez   
APRN.CNP  
Work Phone:   
[phone]  
   
                                        Start: 10-   Ct head/brain w/o co  
ntrast   
material                                            Amalia Marquez   
APRN.CNP  
Work Phone:   
[phone]  
   
                                        Start: 10-   Ct cervical spine w/  
o   
contrast material                                   Amalia Marquez   
APRN.CNP  
Work Phone:   
[phone]  
   
                                                    Start: 2023  
End: 2023                         Urnls dip stick/tablet rgnt   
auto w/o microscopy                                 Sydnie Mccormick PA-C  
Work Phone:   
[phone]  
   
                                        Start: 2023   Urnls dip stick/tabl  
et rgnt   
auto w/o microscopy                                 Michelle Velasquez   
APRN.CNP  
Work Phone:   
[phone]  
   
                                        Start: 2023   Arthrocentesis aspir  
&/inj   
major jt/bursa w/o us                               Grisel Cardenas PA-C  
Work Phone:   
[phone]  
   
                                        Start: 2023   Radex shoulder compl  
ete   
minimum 2 views                                     Michelle Velasquez   
APRN.CNP  
Work Phone:   
[phone]  
   
                          Start: 2023 STREP A MOLECULAR (POC)               
 Lionel Garcia MD  
Work Phone:   
[phone]  
   
                          Start: 2023 STREP A MOLECULAR (POC)               
 Ely Lu APRN.CNP  
Work Phone:   
[phone]  
   
                          Start: 04- STREP A MOLECULAR (POC)               
 Sydnie Mccormick PA-C  
Work Phone:   
[phone]  
   
                                        Start: 2023   Radiologic small int  
estine   
follow-through study                                Bijan Reeves MD  
Work Phone:   
[phone]  
   
                          Start: 2023 XR UPPER GI SINGLE CONTRAST           
     Bijan Reeves MD  
Work Phone:   
[phone]  
   
                          Start: 2023 COVID WITH FLUA+B, ROUTINE            
    Delmis Ball APRN.CNP  
Work Phone:   
[phone]  
   
                          Start: 2022 STREP A MOLECULAR (POC)               
 Neto Talbot APRN.CNP  
Work Phone:   
[phone]  
   
                                        Start: 2022   Urnls dip stick/tabl  
et rgnt   
auto w/o microscopy                                 Sydnie Mccormick PA-C  
Work Phone:   
[phone]  
   
                                        Start: 2020   Adult depression scr  
eening   
assessment                                          Socorro Zavala APRN.CNP  
Work Phone:   
[phone]  
  
  
  
Plan of Treatment  
  
  
                          Date         Care Activity Detail       Author  
   
                                        Start: 2026   Urine microalbumin   
profile                                             [facility]  
   
                                        Start: 2025   GC (Gonorrhea) Scree  
prabha   
(18-24)                                 GC (Gonorrhea) Screening   
(18-)                                 [facility]  
   
                                        Start: 2025   Screening for Chlamy  
ngozi   
trachomatis                             Chlamydia Screening   
()                                 [facility]  
   
                                                    Start: 2024  
End: 2024           ambulatory                2024 7:30 AM EDT   
Results Only [facility]   
[address] [place] [address]   
[phone]                            [facility]   
Laboratory  
   
                                                    Start: 2024  
End: 2024           ambulatory                2024 4:45 PM EDT   
Results Only [facility]   
[address] [address]   
[phone]                            [facility]   
Laboratory  
   
                                                    Start: 2024  
End: 2024                         Patient encounter   
procedure                               2024 1:00 PM EDT   
Appointment Radiology   
[address] [place] [address]   
[phone] Migraine   
with aura and without   
status migrainosus, not   
intractable [G43.109];   
New onset headache   
[R51.9]                                 Radiology  
   
                                        Comment on above:   Migraine with aura a  
nd without status migrainosus, not   
intractable   
[G43.109]; New onset headache [R51.9]   
   
                                        Start: 2024   Covid-19 Vaccine ( season)                         Covid-19 Vaccine (1 -   
2023-24 season)                         [facility]  
   
                                        Start: 2024   Covid-19 Vaccine (1   
-   
2024-25 season)                         Covid-19 Vaccine (1 -   
2024-25 season)                         [facility]  
   
                          Start: 2024 Influenza vaccination              C  
[facility]  
   
                                        Start: 2024   Screening for malign  
ant   
neoplasm of cervix                      Cervical Cancer   
Screening                               [facility]  
   
                                        Start: 2024   Behavioral Health   
Screening                               Behavioral Health   
Screening                               [facility]  
   
                          Start: 2024 Depression Assessment Depression Ass  
essment [facility]  
   
                                        Start: 2023   Covid-19 Vaccine (1   
-   
2023-24 season)                         Covid-19 Vaccine (1 -   
2023-24 season)                         [facility]  
   
                          Start: 2023 Influenza vaccination              C  
[facility]  
   
                                        Start: 2023   FUV, Provider:   
Meenu Ng, Status:   
Pen, Time: 8:30 AM                      FUV, Provider:   
Meenu Ng, Status:   
Pen, Time: 8:30 AM                      [facility]   
[facility]  
Work Phone:   
[phone]  
   
                                        Start: 2023   FUV, Provider:   
Meenu Ng, Status:   
Pen, Time: 8:30 AM                      FUV, Provider:   
Meenu Ng, Status:   
Pen, Time: 8:30 AM                      [facility]   
[facility]  
Work Phone:   
[phone]  
   
                                        Start: 2023   CHLAMYDIA SCREENING   
()                                 CHLAMYDIA SCREENING   
()                                 [facility]  
   
                                        Start: 2023   GC (GONORRHEA) SCREE  
PRABHA   
()                                 GC (GONORRHEA) SCREENING   
()                                 [facility]  
   
                                        Start: 2023   Screening for Chlamy  
ngozi   
trachomatis                             Chlamydia Screening   
()                                 [facility]  
   
                                        Start: 2023   CHLAMYDIA SCREENING   
(18-24)                                 CHLAMYDIA SCREENING   
(18-24)                                 [facility]  
   
                                                    Start: 2023  
End: 2023                         Fungus identified in   
Unspecified specimen by   
Culture                                             [facility]  
Work Phone:   
[phone]  
   
                                        Comment on above:   Expected: 2023  
, Expires: 2023   
   
                                        Start: 2023   GC (GONORRHEA) UVALDO PEREZG   
(18-24)                                 GC (GONORRHEA) SCREENING   
(18-24)                                 [facility]  
   
                                                    Start: 2023  
End: 03-                         Calprotectin [Mass/mass]   
in Stool                                CALPROTECTIN,FECAL [facility]   
Routine Chronic diarrhea   
Expected: 2023,   
Expires: 03/15/2023                     [facility]  
Work Phone:   
[phone]  
   
                                        Comment on above:   Expected: 2023  
, Expires: 03/15/2023   
   
                                                    Start: 2023  
End: 03-                         Clostridioides difficile   
toxin genes [Presence]   
in Stool by KASSIDY with   
probe detection                         C. DIFFICILE PCR Lab   
Routine Chronic diarrhea   
Expected: 2023,   
Expires: 03/15/2023                     [facility]  
Work Phone:   
[phone]  
   
                                        Comment on above:   Expected: 2023  
, Expires: 03/15/2023   
   
                                                    Start: 2023  
End: 03-                         ENTERIC BACTERIAL PANEL   
BY PCR                                  ENTERIC BACTERIAL PANEL   
BY PCR Lab Routine   
Chronic diarrhea   
Expected: 2023,   
Expires: 03/15/2023                     [facility]  
Work Phone:   
[phone]  
   
                                        Comment on above:   Expected: 2023  
, Expires: 03/15/2023   
   
                          Start: 2023 [facility]MENT [facility]  
   
                          Start: 2022 Influenza vaccination              [facility]  
   
                                        Start: 2022   MENINGOCOCCAL B:   
Consider based on risk   
(1 of 2 - Risk Bexsero   
2-dose series)                          MENINGOCOCCAL B:   
Consider based on risk   
(1 of 2 - Risk Bexsero   
2-dose series)                          [facility]  
   
                                        Comment on above:   Postponed from  (Declined at this time)   
   
                          Start: 01- DEPRESSION ASSESSMENT DEPRESSION ASS  
ESSMENT [facility]  
   
                                        Start: 2021   Adult depression   
screening assessment      DEPRESSION SCREENING      [facility]  
   
                          Start: 2021 Anxiety Screening Anxiety Screening   
[facility]  
   
                                        Start: 2021   CHLAMYDIA SCREENING   
(18-)                                 CHLAMYDIA SCREENING   
(18-24)                                 [facility]  
   
                          Start: 2021 Depression Screening Depression Scre  
ening [facility]  
   
                                        Start: 2021   GC (GONORRHEA) SCREE  
PRABHA   
(18-24)                                 GC (GONORRHEA) SCREENING   
(18)                                 [facility]  
   
                          Start: 2021 HEPATITIS C SCREENING HEPATITIS C [facility]  
   
                          Start: 2021 Hepatitis C screening Hepatitis C [facility]  
   
                          Start: 2021 HIV SCREENING HIV SCREENING [facility]  
   
                          Start: 2021 HIV screening HIV Screening [facility]  
   
                                        Start: 2019   Meningococcal B Vacc  
ine:   
Consider Based On Risk   
(1 of 2 - Patient Seeks   
Protection)                             Meningococcal B Vaccine:   
Consider Based On Risk   
(1 of 2 - Patient Seeks   
Protection)                             [facility]  
   
                                        Start: 2019   MENINGOCOCCAL B:   
Consider based on risk   
(1 of 2 - Patient Seeks   
Protection)                             MENINGOCOCCAL B:   
Consider based on risk   
(1 of 2 - Patient Seeks   
Protection)                             [facility]  
   
                                        Start: 2018   HPV Vaccine (1 - 3-d  
ose   
series)                                 HPV Vaccine (1 - 3-dose   
series)                                 [facility]  
   
                                        Start: 2017   PEDS TO ADULT TRANSI  
TION   
ANNUAL ASSESSMENT                       PEDS TO ADULT TRANSITION   
ANNUAL ASSESSMENT                       [facility]  
   
                                        Start: 2014   HPV VACCINE (1 - 2-d  
ose   
series)                                 HPV VACCINE (1 - 2-dose   
series)                                 [facility]  
   
                                        Start: 2013   MENINGOCOCCAL B:   
Consider based on risk   
(1 of 2 - Risk Bexsero   
2-dose series)                          MENINGOCOCCAL B:   
Consider based on risk   
(1 of 2 - Risk Bexsero   
2-dose series)                          [facility]  
   
                                        Start: 2012   HPV VACCINE (1 - 2-d  
ose   
series)                                 HPV VACCINE (1 - 2-dose   
series)                                 [facility]  
   
                          Start: 2009 PNEUMOCOCCAL (1 - PCV) PNEUMOCOCCAL   
(1 - PCV) [facility]  
   
                                Start: 2009 Pneumococcal vaccination Pneum  
ococcal Vaccine (1   
- PCV)                                  [facility]  
   
                          Start: 2008 COVID-19 VACCINE (#1) COVID-19 VACCI  
NE (#1) [facility]  
   
                          Start: 2008 COVID-19 VACCINE (1) COVID-19 VACCIN  
E (1) [facility]  
   
                          Start: 2004 COVID-19 VACCINE (#1) COVID-19 VACCI  
NE (#1) [facility]  
   
                                                            Bacteria identified   
in   
Urine by Culture                        URINE CULTURE   
Microbiology Routine   
Urinary frequency   
Ordered: 2022                     [facility]  
Work Phone:   
[phone]  
   
                                        Comment on above:   Ordered: 2022   
   
                                                            Bacteria identified   
in   
Urine by Culture                        URINE CULTURE   
Microbiology Routine   
Burning with urination   
Ordered: 2023                     [facility]  
Work Phone:   
[phone]  
   
                                        Comment on above:   Ordered: 2023   
   
                                                            Bacteria identified   
in   
Urine by Culture                        URINE CULTURE   
Microbiology Routine   
Recurrent UTI Ordered:   
2023                              [facility]  
Work Phone:   
[phone]  
   
                                        Comment on above:   Ordered: 2023   
   
                                                            Bacteria identified   
in   
Urine by Culture                        URINE CULTURE   
Microbiology Routine   
Dysuria 2024 2:27   
PM EDT                                  [facility]  
Work Phone:   
[phone]  
   
                                                            Bacteria identified   
in   
Wound by Culture                        WOUND CULTURE AND GRAM   
STAIN Microbiology   
Routine Vulvar abscess   
Ordered: 08/10/2022                     [facility]  
Work Phone:   
[phone]  
   
                                        Comment on above:   Ordered: 08/10/2022   
   
                                                            BACTERIAL VAGINOSIS   
AMPLIFICATION                           BACTERIAL VAGINOSIS   
AMPLIFICATION Lab   
Routine Screen for STD   
(sexually transmitted   
disease) Ordered:   
2022                              [facility]  
Work Phone:   
[phone]  
   
                                        Comment on above:   Ordered: 2022   
   
                                                            BACTERIAL VAGINOSIS   
AMPLIFICATION                           BACTERIAL VAGINOSIS   
AMPLIFICATION Lab   
Routine Vaginal   
discharge Ordered:   
2022                              [facility]  
Work Phone:   
[phone]  
   
                                        Comment on above:   Ordered: 2022   
   
                                                BACTERIAL VAGINOSIS NAAT BACTERI  
AL VAGINOSIS NAAT   
Lab Routine Pelvic pain   
in female 2024   
1:47 PM EST                             [facility]  
Work Phone:   
[phone]  
   
                                                            MOLLY / TRICHOMONA  
S   
AMPLIFICATION                           MOLLY / TRICHOMONAS   
AMPLIFICATION Lab   
Routine Screen for STD   
(sexually transmitted   
disease) Ordered:   
2022                              [facility]  
Work Phone:   
[phone]  
   
                                        Comment on above:   Ordered: 2022   
   
                                                            MOLLY / TRICHOMONA  
S   
AMPLIFICATION                           MOLLY / TRICHOMONAS   
AMPLIFICATION Lab   
Routine Vaginal   
discharge Ordered:   
2022                              [facility]  
Work Phone:   
[phone]  
   
                                        Comment on above:   Ordered: 2022   
   
                                                MOLLY/TRICHOMONAS NAAT MOLLY  
/TRICHOMONAS NAAT   
Lab Routine Pelvic pain   
in female 2024   
1:47 PM EST                             [facility]  
Work Phone:   
[phone]  
   
                                                            Chlamydia   
trachomatis+Neisseria   
gonorrhoeae DNA   
[Presence] in   
Unspecified specimen by   
KASSIDY with probe detection                GC/CHLAMYDIA DNA DET Lab   
Routine Screen for STD   
(sexually transmitted   
disease) Ordered:   
2022                              [facility]  
Work Phone:   
[phone]  
   
                                        Comment on above:   Ordered: 2022   
   
                                                            Chlamydia   
trachomatis+Neisseria   
gonorrhoeae DNA   
[Presence] in   
Unspecified specimen by   
KASSIDY with probe detection                GC/CHLAMYDIA DNA DET Lab   
Routine Vaginal   
discharge Ordered:   
2022                              [facility]  
Work Phone:   
[phone]  
   
                                        Comment on above:   Ordered: 2022   
   
                                                            Chlamydia   
trachomatis+Neisseria   
gonorrhoeae DNA   
[Presence] in   
Unspecified specimen by   
KASSIDY with probe detection                GONORRHEA/CHLAMYDIA NAAT   
Lab Routine Pelvic pain   
in female 2024   
1:47 PM [facility]  
Work Phone:   
[phone]  
   
                                                      
End: 2024           CT BRAIN WO IVCON         CT BRAIN WO IVCON   
Radiology Routine Injury   
of head, subsequent   
encounter 1 Occurrences   
starting 10/13/2023   
until 2024                        [facility]  
Work Phone:   
[phone]  
   
                                        Comment on above:   1 Occurrences starti  
ng 10/13/2023 until 2024   
   
                                                      
End: 2024                         Ct cervical spine w/o   
contrast material                       CT CERVICAL SPINE WO   
IVCON Radiology Routine   
History of concussion   
Injury of head,   
subsequent encounter 1   
Occurrences starting   
10/13/2023 until   
2024                              [facility]  
Work Phone:   
[phone]  
   
                                        Comment on above:   1 Occurrences starti  
ng 10/13/2023 until 2024   
   
                                                      
End: 2024           CTA HEAD W IVCON          CTA HEAD W IVCON   
Radiology Routine   
History of concussion   
Injury of head,   
subsequent encounter 1   
Occurrences starting   
10/13/2023 until   
2024                              [facility]  
Work Phone:   
[phone]  
   
                                        Comment on above:   1 Occurrences starti  
ng 10/13/2023 until 2024   
   
                                                      
End: 2024           CTA NECK W IVCON          CTA NECK W IVCON   
Radiology Routine Injury   
of head, subsequent   
encounter 1 Occurrences   
starting 10/13/2023   
until 2024                        [facility]  
Work Phone:   
[phone]  
   
                                        Comment on above:   1 Occurrences starti  
ng 10/13/2023 until 2024   
   
                                                      
End: 2024                         Gastric emptying imaging   
study                                   NM GASTRIC EMPTYING   
SOLID Radiology Routine   
Nausea 1 Occurrences   
starting 2023   
until 2024                        [facility]  
Work Phone:   
[phone]  
   
                                        Comment on above:   1 Occurrences starti  
ng 2023 until 2024   
   
                                                            Giardia   
lamblia+Cryptosporidium   
sp Ag [Presence] in   
Stool by Immunoassay                    CRYPTOSPORIDIUM AND   
GIARDIA ANTIGENS BY EIA   
Microbiology Routine   
Chronic diarrhea   
Ordered: 2023                     [facility]  
Work Phone:   
[phone]  
   
                                        Comment on above:   Ordered: 2023   
   
                                                      
End: 09-                         MR Brain WO and W   
contrast IV                             MRI BRAIN WO/W IVCON   
Radiology Routine   
Migraine with aura and   
without status   
migrainosus, not   
intractable New onset   
headache 1 Occurrences   
starting 2024   
until 09/15/2025                        [facility]  
Work Phone:   
[phone]  
   
                                        Comment on above:   1 Occurrences starti  
ng 2024 until 09/15/2025   
   
                                                            Mycoplasma sp identi  
fied   
in Unspecified specimen   
by Organism specific   
culture                                 MYCOPLASMA CULT   
Microbiology Routine   
Pelvic pain in female   
2024 1:47 PM EST                  [facility]  
Work Phone:   
[phone]  
   
                                                            Removal impacted cer  
umen   
irrigation/lvg unilat                   AMBULATORY EAR   
LAVAGE/IRRIGATION   
Procedures Routine   
Impacted cerumen of   
right ear Ordered:   
2023                              [facility]  
Work Phone:   
[phone]  
   
                                        Comment on above:   Ordered: 2023   
   
                                                      
End: 2024                         US MESENTERIC ARTERY   
CMPLT VAS LAB                           US MESENTERIC ARTERY   
CMPLT VAS LAB Vascular   
Lab Routine Vomiting   
without nausea,   
unspecified vomiting   
type Generalized   
abdominal pain 1   
Occurrences starting   
2023 until   
2024                              [facility]  
Work Phone:   
[phone]  
   
                                        Comment on above:   1 Occurrences starti  
ng 2023 until 2024   
   
                                                      
End: 2024           Us transvaginal           US FEMALE PELVIS   
TRANSVAG Radiology   
Routine Abnormal CT of   
the abdomen 1   
Occurrences starting   
2023 until   
2024                              [facility]  
Work Phone:   
[phone]  
   
                                        Comment on above:   1 Occurrences starti  
ng 2023 until 2024   
   
                                                      
End: 2024                         XR GI SMALL BOWEL   
FOLLOW-THRU                             XR GI SMALL BOWEL   
FOLLOW-THRU Radiology   
Routine Vomiting without   
nausea, unspecified   
vomiting type   
Generalized abdominal   
pain 1 Occurrences   
starting 2023   
until 2024                        [facility]  
Work Phone:   
[phone]  
   
                                        Comment on above:   1 Occurrences starti  
ng 2023 until 2024   
   
                                                      
End: 2024                         XR UPPER GI SINGLE   
CONTRAST                                XR UPPER GI SINGLE   
CONTRAST Radiology   
Routine Vomiting without   
nausea, unspecified   
vomiting type   
Generalized abdominal   
pain 1 Occurrences   
starting 2023   
until 2024                        [facility]  
Work Phone:   
[phone]  
   
                                        Comment on above:   1 Occurrences starti  
ng 2023 until 2024   
   
                                                                 [facility]  
  
  
  
Immunizations  
  
  
                      Immunization Date Immunization Notes      Care Provider Fa  
[facility]  
   
                                        2021          meningococcal   
polysaccharide (groups   
A, C, Y and W-135)   
diphtheria toxoid   
conjugate vaccine   
(MCV4P)                                             Socorro Zavala APRN.CNP  
Work Phone:   
[phone]                          [facility]  
   
                                        2016          meningococcal   
polysaccharide (groups   
A, C, Y and W-135)   
diphtheria toxoid   
conjugate vaccine   
(MCV4P)                                             Socorro Zavala APRN.CNP  
Work Phone:   
[phone]                          [facility]  
   
                                        2016          tetanus toxoid, redu  
nicole   
diphtheria toxoid, and   
acellular pertussis   
vaccine, adsorbed                                   Socorro Sally   
APRN.[facility]  
Work Phone:   
[phone]                          [facility]  
   
                                        2009          diphtheria, tetanus   
toxoids and acellular   
pertussis vaccine                                   Socorro Sally   
APRN.[facility]  
Work Phone:   
[phone]                          [facility]  
Work Phone:   
[phone]  
   
                                        2009          measles, mumps and   
rubella virus vaccine                               Socorro Sally   
APRN.[facility]  
Work Phone:   
[phone]                          [facility]  
Work Phone:   
[phone]  
   
                                        2009          poliovirus vaccine,   
inactivated                                         Socorro Sally   
APRN.[facility]  
Work Phone:   
[phone]                          [facility]  
Work Phone:   
[phone]  
   
                          2009   varicella virus vaccine              Curtis  
e Enid   
APRN.[facility]  
Work Phone:   
[phone]                          [facility]  
Work Phone:   
[phone]  
   
                                        2006          influenza virus vacc  
ine,   
unspecified formulation                             Socorro Sally   
APRN.[facility]  
Work Phone:   
[phone]                          [facility]  
Work Phone:   
[phone]  
   
                                        2005          influenza virus vacc  
ine,   
unspecified formulation                             Socorro Enid   
APRN.[facility]  
Work Phone:   
[phone]                          [facility]  
Work Phone:   
[phone]  
   
                                        2004          diphtheria, tetanus   
toxoids and acellular   
pertussis vaccine                                   Socorro Sally   
APRN.[facility]  
Work Phone:   
[phone]                          [facility]  
Work Phone:   
[phone]  
   
                                        2004          pneumococcal conjuga  
te   
vaccine, 7 valent                                   Socorro Enid   
APRN.[facility]  
Work Phone:   
[phone]                          [facility]  
Work Phone:   
[phone]  
   
                                        11-          influenza virus vacc  
ine,   
unspecified formulation                             Socorro Sally   
APRN.CNP  
Work Phone:   
[phone]                          [facility]  
Work Phone:   
[phone]  
   
                                        10-          influenza virus vacc  
ine,   
unspecified formulation                             Socorro Sally   
APRN.[facility]  
Work Phone:   
[phone]                          [facility]  
Work Phone:   
[phone]          haemophilus influenz  
ae   
type b vaccine, HbOC   
conjugate                                           Socorro Enid   
APRN.[facility]  
Work Phone:   
[phone]                          [facility]  
Work Phone:   
[phone]          measles, mumps and   
rubella virus vaccine                               Socorro Sally   
APRN.CNP  
Work Phone:   
[phone]                          [facility]  
Work Phone:   
[phone]   varicella virus vaccine              Curtis  
e Enid   
APRN.[facility]  
Work Phone:   
[phone]                          [facility]  
Work Phone:   
[phone]          hepatitis B vaccine,  
   
pediatric or   
pediatric/adolescent   
dosage                                              Socorro Enid   
APRN.[facility]  
Work Phone:   
[phone]                          [facility]  
Work Phone:   
[phone]          diphtheria, tetanus   
toxoids and acellular   
pertussis vaccine                                   Socorro Sally   
APRN.[facility]  
Work Phone:   
[phone]                          [facility]  
Work Phone:   
[phone]          haemophilus influenz  
ae   
type b vaccine, HbOC   
conjugate                                           Socorro Enid   
APRN.[facility]  
Work Phone:   
[phone]                          [facility]  
Work Phone:   
[phone]          pneumococcal conjuga  
te   
vaccine, 7 valent                                   Socorro Sally   
APRN.[facility]  
Work Phone:   
[phone]                          [facility]  
Work Phone:   
[phone]          poliovirus vaccine,   
inactivated                                         Socorro Enid   
APRN.CNP  
Work Phone:   
[phone]                          [facility]  
Work Phone:   
[phone]          diphtheria, tetanus   
toxoids and acellular   
pertussis vaccine                                   Socorro Sally   
APRN.[facility]  
Work Phone:   
[phone]                          [facility]  
Work Phone:   
[phone]          haemophilus influenz  
ae   
type b vaccine, HbOC   
conjugate                                           Socorro Enid   
APRN.[facility]  
Work Phone:   
[phone]                          [facility]  
Work Phone:   
[phone]          pneumococcal conjuga  
te   
vaccine, 7 valent                                   Socorro Sally   
APRN.[facility]  
Work Phone:   
[phone]                          [facility]  
Work Phone:   
[phone]  
   
                                        2003          poliovirus vaccine,   
inactivated                                         Socorro Enid   
APRN.[facility]  
Work Phone:   
[phone]                          [facility]  
Work Phone:   
[phone]  
   
                                        2003          diphtheria, tetanus   
toxoids and acellular   
pertussis vaccine                                   Socorro Enid   
APRN.[facility]  
Work Phone:   
[phone]                          [facility]  
Work Phone:   
[phone]  
   
                                        2003          haemophilus influenz  
ae   
type b vaccine, HbOC   
conjugate                                           Socorro Sally   
APRN.[facility]  
Work Phone:   
[phone]                          [facility]  
Work Phone:   
[phone]  
   
                                        2003          pneumococcal conjuga  
te   
vaccine, 7 valent                                   Socorro Enid   
APRN.[facility]  
Work Phone:   
[phone]                          [facility]  
Work Phone:   
[phone]  
   
                                        2003          poliovirus vaccine,   
inactivated                                         Socorro Sally   
APRN.[facility]  
Work Phone:   
[phone]                          [facility]  
Work Phone:   
[phone]  
   
                                        2003          hepatitis B vaccine,  
   
pediatric or   
pediatric/adolescent   
dosage                                              Socorro Sally   
APRN.[facility]  
Work Phone:   
[phone]                          [facility]  
Work Phone:   
[phone]  
   
                                        2003          hepatitis B vaccine,  
   
pediatric or   
pediatric/adolescent   
dosage                                              Socorro Sally   
APRN.[facility]  
Work Phone:   
[phone]                          [facility]  
Work Phone:   
[phone]  
  
  
  
Payers  
  
  
                          Date         Payer Category Payer        Policy ID  
   
                                2021      Unknown         [employer]  
 PPO OOS   
[id number]   
2021-Present   
[phone]  [id number]   
[place] [address] PPO                   [id number]   
[id number]  
[id number]  
   
                          2021   Unknown                   [id number]  
[id number]  
[id number]  
   
                          2021   Unknown                   [id number]  
   
                          2003   Unknown                   [id number]   
[id number]  
   
                          2003   Unknown                   [id number]   
[id number]  
   
                          2003   Unknown                   [id number]   
[id number]  
   
                          2003   Unknown                   [id number]   
[id number]  
   
                          2003   Unknown                   [id number]   
[id number]  
   
                          2003   Unknown                   [id number]   
[id number]  
   
                                       Unknown                   [id number]  
  
  
  
Social History  
  
  
                          Date         Type         Detail       Facility  
   
                                                    Start: 2014  
End: 2024                         Tobacco smoking   
status NHIS               Never smoked tobacco      [facility]  
   
                                                    Start: 2014  
End: 2024                         Tobacco use and   
exposure                                Smokeless tobacco   
non-user                                [facility]  
   
                                                    Start: 2022  
End: 12-           Alcohol intake            Current non-drinker of   
alcohol (finding)                       [facility]  
   
                                                    Start: 2014  
End: 08-     Tobacco Comment     smokers outside     [facility]  
   
                                        Start: 2003   Sex Assigned At   
Birth                     Not on file               [facility]  
   
                                                    Start: 2022  
End: 08-                         Exposure to   
SARS-CoV-2 (event)        Not sure                  [facility]  
Work Phone:   
[phone]  
   
                                                            History of tobacco   
use                       Passive smoker            [facility]  
   
                                        Start: 2023   Tobacco smoking   
status NHIS               Smokes tobacco daily      [facility]  
   
                                                            History of tobacco   
use                       Cigarette Smoker          [facility]  
   
                          Start: 2023 Education    13           [facility]  
   
                          Start: 2023 Tobacco Comment Vaping       [facility]  
[facility]  
   
                                                    Start: 2023  
End: 2023                         History of Social   
function                                            [facility]  
   
                                                    Start: 2023  
End: 2023     Tobacco use panel                       [facility]  
   
                                                            National Score   
(1-100), lower   
number is lower risk      69                        [facility]  
   
                                                                Tobacco smoking   
consumption unknown                     [facility]  
   
                                Start: 2023 Tobacco Comment VAPING-NICOTIN  
E,DISPOS  
ABLE PODS- NOTED   
23- [facility]  
   
                                Start: 2024 Tobacco Comment VAPING-NICOTIN  
E,DISPOS  
ABLE PODS- NOTED   
23- SS2024:   
Pt weaning off vaping                   [facility]  
   
                                                    NEGATED: Highlighted   
rowStart: NINF                          History of tobacco   
use                       Passive smoker            [facility]  
  
  
  
Clinical Notes 2021 to 12-  
Telephone Encounter - Ivette Fair RN - 12/10/2024 9:46 AM ESTTelephone Encounter  
 - Ivette Fair RN - 12/10/2024 9:46 AM ESTTelephone Encounter - Angelic Boone OCCA - 2024 9:58 AM EST  
  
                                Note Date & Type Note            Facility  
   
                                                    12- Telephone   
encounter Note                          Formatting of this note is   
different from the original.  
Prescription Refill Information  
  
The patient has been identified   
by name and date of birth: Yes  
  
Caregiver verified no other   
encounters exist for this   
prescription request: Yes  
  
Caregiver confirmed with   
patient/requestor that no other   
refills are due, in the near   
future, with this provider at   
this time: Yes  
  
The last office visit in the   
department: 2024 w/Amalia Marquez CNP  
  
Does the patient have a future   
office visit with this   
provider/department: No  
  
Requested Prescriptions  
  
Pending Prescriptions Disp   
Refills  
topiramate (TOPAMAX) 25 mg   
tablet [Pharmacy Med Name:   
Topiramate 25 MG Oral Tablet] 30   
tablet 0  
Sig: TAKE 1 TABLET BY MOUTH ONCE   
DAILY AT BEDTIME  
Last ordered: 2024 by   
Amalia Marquez CNP  
  
Routing to provider for review.  
Ivette Fair RN  
December 10, 2024 9:46 AM  
Electronically signed by Ivette Fair RN at 12/10/2024 9:47 AM   
EST  
                                        [facility]  
   
                                                    12- Miscellaneous   
Notes                                   Formatting of this note is   
different from the original.  
Prescription Refill Information  
  
The patient has been identified   
by name and date of birth: Yes  
  
Caregiver verified no other   
encounters exist for this   
prescription request: Yes  
  
Caregiver confirmed with   
patient/requestor that no other   
refills are due, in the near   
future, with this provider at   
this time: Yes  
  
The last office visit in the   
department: 2024 w/Amalia Marquez CNP  
  
Does the patient have a future   
office visit with this   
provider/department: No  
  
Requested Prescriptions  
  
Pending Prescriptions Disp   
Refills  
topiramate (TOPAMAX) 25 mg   
tablet [Pharmacy Med Name:   
Topiramate 25 MG Oral Tablet] 30   
tablet 0  
Sig: TAKE 1 TABLET BY MOUTH ONCE   
DAILY AT BEDTIME  
Last ordered: 2024 by   
Amalia Marquez CNP  
  
Routing to provider for review.  
Ivette Fair RN  
December 10, 2024 9:46 AM  
Electronically signed by Ivette Fair RN at 12/10/2024 9:47 AM   
EST  
documented in this encounter            [facility]  
   
                                                    2024 Telephone   
encounter Note                          Formatting of this note might be   
different from the original.  
Patient requesting refill. Has   
not completed labs.  
  
LOV 2024  
  
Routed to provider for review   
and further assistance.  
Electronically signed by   
Angelic Boone OCCA at   
2024 9:59 AM EST  
                                        [facility]  
   
                                                    2024 Miscellaneous   
Notes                                   Formatting of this note might be   
different from the original.  
Patient requesting refill. Has   
not completed labs.  
  
LOV 2024  
  
Routed to provider for review   
and further assistance.  
Electronically signed by   
Angelic Boone OCCA at   
2024 9:59 AM EST  
documented in this encounter            [facility]  
   
                                                    2024 Telephone   
encounter Note                          Formatting of this note might be   
different from the original.  
Received insurance authorization   
approval from [facility]/ for   
MRI/CAT Scan.  
  
Start date: 2024  
End date:2024  
  
Reference #[id number]  
Electronically signed by   
Angelic Boone OCCA at   
2024 1:11 PM EDT  
                                        [facility]  
   
                                                    2024 Miscellaneous   
Notes                                   Formatting of this note might be   
different from the original.  
Received insurance authorization   
approval from [facility]/ for   
MRI/CAT Scan.  
  
Start date: 2024  
End date:2024  
  
Reference #[id number]  
Electronically signed by   
Angelic Boone OCCA at   
2024 1:11 PM EDT  
documented in this encounter            [facility]  
   
                                                    2024 History of   
Present illness Narrative               Formatting of this note is   
different from the original.  
Radiology Service Progress Note  
  
DATE OF SERVICE: 2024  
TIME: 1:12 PM  
  
PATIENT IDENTITY VERIFICATION   
COMPLETED USING TWO (2) STANDARD   
IDENTIFIERS: Name and Date of   
Birth confirmed by patient   
verbally.  
FALL SCREENING: Has the patient   
had 2 falls in the last year or   
1 fall with injury or currently   
using an Ambulatory Assistive   
Device (Walker, Cane,   
Wheelchair, Crutches, etc.)? No  
PATIENT GENDER DATA: Female.   
Pregnancy status: Pregnant: No   
Breastfeeding status: NO.  
PATIENT RELEVANT IMPLANT DATA   
REVIEWED: Yes  
PATIENT PRESENTS WITH AN   
IMPLANTABLE OR ATTACHED MEDICAL   
DEVICE: No  
  
ALLERGIES: Reviewed and   
unchanged  
CONTRAST ALLERGY: NO.  
  
EXAM: MRI - CONTRAST TYPE: GROUP   
II  
  
PERIPHERAL IV DATA: Ambulatory:   
A peripheral IV was started in   
the Left antecubital site with a   
Angio cath: 22 gauge.  
RADIOLOGY DEPARTMENT: MR;   
Exam(s) Completed: Head: Routine   
Brain  
  
SIGNATURE: APOLONIA Salmeron)   
PATIENT NAME: Deanna Rowell  
DATE: 2024 MRN:   
[id number]  
TIME: 1:12 PM  
  
Electronically signed by Anila Mann RT (R) at 2024 1:12   
PM EDT  
documented in this encounter            [facility]  
   
                                        2024 Note     HNO ID: [id number]  
Author: ANILA MANN RT (R)  
Service: ?  
Author Type: Technologist  
Type: Progress Notes  
Filed: 2024 13:12  
Note Text:  
Radiology Service Progress Note  
DATE OF SERVICE: 2024  
TIME: 1:12 PM  
PATIENT IDENTITY VERIFICATION   
COMPLETED USING TWO (2) STANDARD   
IDENTIFIERS:  
Name and Date of Birth confirmed   
by patient verbally.  
FALL SCREENING: Has the patient   
had 2 falls in the last year or   
1 fall with  
injury or currently using an   
Ambulatory Assistive Device   
(Walker, Cane,  
Wheelchair, Crutches, etc.)? No  
PATIENT GENDER DATA: Female.   
Pregnancy status: Pregnant: No   
Breastfeeding  
status: NO.  
PATIENT RELEVANT IMPLANT DATA   
REVIEWED: Yes  
PATIENT PRESENTS WITH AN   
IMPLANTABLE OR ATTACHED MEDICAL   
DEVICE: No  
ALLERGIES: Reviewed and   
unchanged  
CONTRAST ALLERGY: NO.  
EXAM: MRI - CONTRAST TYPE: GROUP   
II  
PERIPHERAL IV DATA: Ambulatory:   
A peripheral IV was started in   
the Left  
antecubital site with a Angio   
cath: 22 gauge.  
RADIOLOGY DEPARTMENT: MR;   
Exam(s) Completed: Head: Routine   
Brain  
SIGNATURE: RT Jaron(R)   
PATIENT NAME: Deanna Rowell  
DATE: 2024 MRN:   
[id number]  
TIME: 1:12 PM                           [facility]  
   
                                                    2024 History of   
Present illness Narrative               Formatting of this note is   
different from the original.  
Images from the original note   
were not included.  
  
  
[facility]   
[place]  
Follow-up Visit  
  
  
Follow-up note  
  
August 15, 2024  
  
HPI: Ms. Rowell presents today   
for a follow-up visit.  
  
Per her previous visit on   
23:  
S09.90XD Injury of head,   
subsequent encounter (primary   
encounter diagnosis)  
Z87.820 History of concussion  
M54.2 Cervical pain  
R42 Dizziness  
H53.10 Subjective visual   
disturbance  
Comment: Pt with pmh concussion   
(x8), anxiety previously seen   
for concussion. She reports she   
was riding a horse that bucked   
and threw her forward. Following   
the event she suffered a severe   
headache, cervical pain,   
peripheral vision loss,   
intermittent blurred vision,   
dizziness/vertigo, and L facial   
numbness. For further evaluation   
CT brain/neck and CTA head/neck   
were completed and unremarkable.   
She was started on gabapentin   
100 mg twice daily for treatment   
of symptoms as well as Naproxen   
500mg BID prn. At time of   
appointment today she reports   
that all symptoms have resolved.   
She has since stopped taking   
gabapentin without recurrence of   
symptoms. She reports no further   
head injuries. As she has no   
further symptoms, no additional   
work-up or treatment is   
necessary at this time.   
Recommend continuing to avoid   
activities that may lead to   
higher susceptibility of head   
injury. She may follow-up as   
needed.  
  
Headaches have been going on for   
about 3 weeks; nonstop. Has   
dealt with headaches the past   
couple weeks. Happened suddenly   
and stayed for one week.  
  
Sometimes throws neck/back out   
when riding horses and will go   
to see chiro. Goes once monthly   
for routine maintenance. Has   
gone 3x in the past month. No   
improvement at all. The day he   
adjusts her the sx become very   
severe.  
  
Two nights ago headache was the   
worse pain of her life.  
  
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   
works at who rec'd heat/ice. Was   
sent a prescription for Fioricet   
by an MD at the office.  
  
Headache pulls on the back of   
her eyes. Stuck around forehead.   
Took 1-2 hours after taking   
medication. Was then given   
Imitrex which she took last   
night. Tried Imitrex last night   
and had severe SE; vomiting,   
muscle weakness, felt cold and   
confused. Went to bed but   
headache became severe and woke   
up again at 2:30 and hasn't   
slept since.  
  
Had n/t on L with severe HA two   
nights ago. Only time this has   
happened. Could not focus when   
driving; no depth perception.  
  
Has tried Tylenol and Aleve;   
avoids NSAIDS d/t ulcer. Uses   
icy hot patches on neck.  
  
HEADACHE LOCATION: frontal   
region bilateral and   
retroorbital bilateral  
Onset: Headaches began 6-8 years   
ago (middle school); only   
occasional headaches after that   
time and no migraines for years   
after  
Quality:  aching ,  pressure ,   
and  stabbing   
Photophobia? No Phonophobia? Yes  
Nausea? Yes Vomitting? Yes  
Aura? Yes blurred vision and   
motor (eye twitching)  
Worse with activity? No  
Frequency: daily headaches but   
as of  just has been very   
severe  
  
Caffeine intake? Occasional red   
bull (2p/mos); does not help HA  
Water intake? Eight or more 8   
ounce glasses daily.  
Sleep concerns? No  
History of concussion? Yes ; no   
injury since last appt. Wears   
helmet when riding regularly.  
  
Autonomic features: None   
Autonomic headache features -:   
none  
  
  
Previous/Current Headache   
treatment  
Preventative: None  
  
Rescue: Imitrex and Butalbital;   
Excedrin  
  
Positional? Yes (worse laying   
down); last November/December   
worse  
Double vision? No  
Worse with   
valsalva/sneeze/cough? No  
  
Plans for pregnancy? No  
  
Family Hx of migraines,   
aneurysms, brain tumors: Dad   
with migraines; sister with   
headaches and benign tumor; no   
aneurysm  
  
No focal weakness to   
extremities. When driving with L   
hand L hand can become numb.   
Last night felt dizzy when going   
to throw up.  
  
PAST MEDICAL HISTORY  
No date: Panic attacks  
  
PAST SURGICAL HISTORY  
2021: LAPAROSCOPIC   
CHOLECYSTECTOMY  
  
Current Outpatient Medications   
on File Prior to Visit  
Medication Sig  
Norethin-Eth Estrad Triphasic   
(ORTHO-NOVUM , ,)   
0.5/0.75/1 mg- 35 mcg per tablet   
Take 1 tablet by mouth once   
daily.  
busPIRone (BUSPAR) 7.5 mg tablet   
Take 7.5 mg by mouth daily at   
bedtime.  
amitriptyline (ELAVIL) 10 mg   
tablet Take 1 tablet by mouth   
daily at bedtime.  
ondansetron (ZOFRAN) 4 mg tablet   
Take 4 mg by mouth every 8 hours   
as needed. Taking as needed.  
  
No current facility-administered   
medications on file prior to   
visit.  
  
Social History  
  
Tobacco Use  
Smoking status: Never  
Passive exposure: Never  
Smokeless tobacco: Never  
Tobacco comments:  
VAPING-NICOTINE,DISPOSABLE PODS-   
NOTED 23-   
Vaping Use  
Vaping Use: current everyday   
user  
Substances: Nicotine  
Substance Use Topics  
Alcohol use: No  
Drug use: No  
  
ALLERGIES  
Allergen Reactions  
Welchol [Colesevela* Hives,   
Itching  
  
Review of Systems:  
Cardiopulmonary: denies chest   
pain, palpitations  
Respiratory: denies shortness of   
breath  
GI/: denies recent nausea,   
vomiting, diarrhea,   
constipation, incontinence  
Musculoskeletal: denies weakness  
Neuro: denies tremors, loss of   
feeling, dizziness, seizure,   
blackout, paresthesia, facial   
paresthesia, facial weakness,   
difficulty in speech, slurring   
of words, dysarthria, dysphagia,   
memory loss, + headache, +   
vision changes, loss of hearing,   
tinnitus  
  
Physical Exam:  
  
24  
0742  
BP: 114/76  
BP Site: Left Arm  
BP Position: Sitting  
BP Cuff Size: Regular Adult  
Pulse: 96  
SpO2: 99%  
Weight: 61.9 kg (136 lb 7.4 oz)  
Height: 165.1 cm (5' 5 )  
  
Patient is alert and in no   
distress. Dress is appropriate.   
Mood is appropriate  
Breathing appears regular and   
unstressed  
  
Neurologic examination:  
  
Cognitively intact. No deficits.   
No formal MOCA performed.  
CN: Pupils equal and reactive to   
light, extraocular movements   
intact with no nystagmus, no   
facial weakness, hearing intact   
bilaterally, tongue is midline   
with no deviation, shoulder   
shrug is symmetric.  
  
Motor Examination:  
Right Upper Extremity: (of 5)   
Left Upper Extremity: (of 5)  
Shoulder Abduction: Deltoid   
(Ax/C5) 5 Shoulder Abduction 5  
Elbow Flexion: Biceps (MC/R and   
C5/6) 5 Elbow Flexion 5  
Elbow Extension: Triceps (R/C7)   
5 Elbow Extension 5  
Wrist Flexion (M/U and C6/7) 5   
Wrist Flexion 5  
Wrist Extension (R/C6)  
5 Wrist Extension 5  
Finger Abduction (U/T1) 5 Finger   
Abduction 5  
  
5  5  
  
Right Lower Extremity: (of 5)   
Left Lower Extremity: (of 5)  
Hip Flexion: Iliopsoas (F and   
L1/2) 5 Hip Flexion 5  
Knee Extension: Quadriceps (F   
and L3/4) 5 Knee Extension 5  
Knee Flexion: Hamstrings (S/S1)   
5 Knee Flexion 5  
Dorsiflexion: Tibialis Anterior   
(DP and L4/5) 5 Dorsiflexion 5  
Plantarflexion:   
Gastrocnemius/Soleus (T and   
S1/2) 5 Plantarflexion 5  
  
Reflexes:  
Right Extremities Left Lower   
Extremities:  
Triceps (C7-8R) 2 Triceps 2  
Biceps (C5-6MC) 2 Biceps 2  
Brachioradialis (C5-6R) 2   
Brachioradialis 2  
Patellar (L3-4F) 1 Patellar 1  
Achilles (S1-2S) 2 Achilles 2  
  
Ankle Clonus: negative   
bilaterally  
Farris's: negative bilaterally  
  
Sensory  
Intact to light touch upper and   
lower extremities bilaterally  
Temperature: intact in all   
extremities  
Normal toe and finger vibratory   
sensation  
  
Coordination: No dysmetria on   
finger to nose. No tremors   
noted. No drift seen.  
Gait normal in stance and   
pattern.  
Tandem without imbalance.  
Negative Romberg.  
  
Labs/studies:  
CT Brain Report  
  
CTA HEAD W IVCON Exam End:   
10/27/2023 12:08 PM (Final   
result)  
Narrative: * * *Final Report* *   
*  
  
DATE OF EXAM: Oct 27 2023   
12:08PM  
  
[facility] 0022 - CTA HEAD W IVCON /   
ACCESSION # [id number]  
  
PROCEDURE REASON: multiple   
diagnoses  
  
* * * * Physician Interpretation   
* * * *  
  
EXAMINATION: CT CERVICAL SPINE   
WO IVCON, CTA HEAD W IVCON, CTA   
NECK W  
IVCON, CT BRAIN WO IVCON  
  
HISTORY: Recent riding injury   
with subsequent headaches,   
peripheral  
visual field loss and blurred   
vision suggesting concussion.  
  
TECHNIQUE: Routine CT of the   
brain without IV contrast. Next,   
high  
resolution axial images were   
obtained through the head, neck   
and superior  
mediastinum following bolus   
administration of intravenous   
contrast for CT  
angiography. 3D maximum   
intensity projection images were   
created,  
reviewed and archived . MQ:   
[id number]; Spiral, high   
resolution axial  
unenhanced images were obtained   
from the skull base to the  
cervicothoracic junction with   
sagittal and coronal planar  
reconstructions. MQ: [id number]  
  
Contrast: 85 mL Omnipaque 350 IV  
CT Radiation dose: Integrated   
Dose-Length Product (DLP) for   
this visit =  
415 (accession [id number]), 1430   
(accession [id number]), 1430   
(accession  
[id number]), 1430 (accession   
[id number]) mGy*cm.  
CT Dose Reduction Employed:   
Automated exposure control(AEC)   
and iterative  
recon  
  
COMPARISON: 2020  
  
  
RESULT:  
  
  
BRAIN:  
  
Acute change: No evidence of an   
acute contusion or other acute  
parenchymal process. ASPECT   
Score = 10  
  
Hemorrhage: No evidence of acute   
intracranial hemorrhage. ECASS  
hemorrhagic transformation   
score: Not Applicable  
  
Mass Lesion / Mass Effect: There   
is no evidence of an   
intracranial  
mass or extra-axial fluid   
collection. No significant mass   
effect.  
  
Chronic change: None apparent.  
  
Parenchyma: There is no   
significant volume loss. The   
brain parenchyma  
is otherwise within normal   
limits for age.  
  
Ventricles: The ventricles are   
within normal limits of size and  
configuration for age.  
  
Other: No evidence of prior   
calvarial fracture. Minimal   
mucosal  
thickening is noted in the   
visualized anterior ethmoid air   
cells. Mild  
mucosal thickening and an   
air-fluid level are noted in   
each maxillary  
antrum suggesting acute   
sinusitis in the appropriate   
clinical setting.  
No clear evidence of underlying   
acute facial bone fracture.  
  
  
NECK:  
  
Soft tissues: The soft tissue   
planes are maintained   
throughout. No  
evidence of a soft tissue mass   
in the neck or superior   
mediastinum. No  
significant lymphadenopathy is   
seen.  
  
Lung apices: The visualized lung   
apices are clear.  
  
CERVICAL SPINE:  
  
Counting reference:   
Craniocervical junction.   
Anatomic Variants: None.  
  
 (topogram) images: No   
additional findings.  
  
Alignment: Alignment is   
anatomic.  
  
Craniocervical junction:   
Craniocervical junction is   
normal.  
  
Osseous structures/fracture: No   
evidence of a lytic or blastic   
process  
in the visualized spine. No   
evidence of acute or chronic   
fracture.  
  
Cervical soft tissues: The   
paraspinal soft tissues are   
within normal  
limits.  
  
Canal and foramina: Cervical   
canal and foramina widely   
patent.  
  
  
CT ARTERIOGRAM:  
  
Extracranial Circulation:  
  
Aortic Arch: There is a normal   
branching pattern from the   
aortic arch..  
There is no significant stenosis   
in the proximal brachiocephalic   
vessels.  
  
Carotid Stenosis:  
Right Common: No significant   
stenosis.  
Right Internal Carotid Plaque:   
The cervical right ICA is within   
normal  
limits of caliber and   
configuration. No clear evidence   
of prior  
dissection.  
Right Internal Carotid Stenosis   
(% by NASCET Criteria): 0  
  
Left Common: No significant   
stenosis.  
Left Internal Carotid Plaque:   
The cervical left ICA is within   
normal  
limits of caliber and   
configuration. No clear evidence   
of prior  
dissection.  
Left Internal Carotid Stenosis   
(% by NASCET Criteria): 0  
  
Cervical Vertebral Arteries:  
Patency: Bilateral. Both   
cervical vertebral arteries also   
appear to be  
within normal limits of caliber   
and configuration.  
Dominance: Right  
  
Intracranial Circulation:  
  
Spot Sign Presence: Not   
Applicable  
Spot Sign Number: Not Applicable  
  
Anterior Circulation: The distal   
ICAs are patent and within   
normal  
limits of caliber and   
configuration. Proximal ACAs and   
MCAs are patent.  
A1 segments are codominant.   
Proximal ACAs and MCAs are   
within normal  
limits of caliber and   
configuration. No evidence of   
focal significant  
stenosis, intraluminal filling   
defect, anomalous luminal   
tapering,  
aneurysm, or vascular   
malformation in the anterior   
intracranial  
circulation.  
  
Vertebrobasilar Circulation: The   
distal vertebral arteries are   
patent.  
Right vertebral artery is   
dominant. The distal vertebral   
and basilar  
arteries are within normal   
limits of caliber and   
configuration. The  
proximal PICAs, AICAs, SCA's and   
PCAs are patent and within   
normal limits  
of caliber and configuration.   
The left PCOM is patent but   
diminutive in  
caliber. No distinct right PCOM   
is seen. No evidence of focal  
significant stenosis, abrupt   
vessel occlusion, aneurysm or   
vascular  
malformation in the posterior   
intracranial circulation.  
  
There is uniform enhancement of   
the superior sagittal, straight,   
and both  
transverse and both sigmoid   
sinuses.  
  
  
 (topogram) images: No   
additional findings.  
  
Impression: IMPRESSION:  
  
Normal head CT. No evidence of   
an intracranial bleed of recent   
closed  
head injury.  
  
Normal cervical spine. No   
evidence of acute cervical spine   
fracture.  
  
Normal extracranial vasculature.   
No evidence of cervical internal  
carotid or vertebral artery   
dissection.  
  
No evidence of significant large   
vessel intracranial occlusive   
disease or  
intracranial aneurysm.  
  
Arterial blood flow was measured   
to detect acute large vessel   
occlusion  
by computer aided detection   
software: Not Performed.  
Concordance between software and   
imaging review: Not Applicable.  
  
  
  
  
  
  
  
  
  
: ABDOUL  
Transcribe Date/Time: Oct 27   
2023 1:52P  
  
Dictated by : KALEB JIMENEZ MD  
  
This examination was interpreted   
and the report reviewed and  
electronically signed by:  
KALEB JIMENEZ MD on Oct 27 2023   
2:08PM EST  
  
  
  
  
  
  
  
  
  
  
CT Brain Report  
  
CT BRAIN WO IVCON Exam End:   
2020 12:22 AM (Final   
result)  
Narrative: * * *Final Report* *   
*  
  
DATE OF EXAM: 2020   
12:22AM  
  
[facility] [place] /   
ACCESSION # [id number]  
  
PROCEDURE REASON: Head trauma,   
headache  
  
* * * * Physician Interpretation   
* * * *  
  
EXAMINATION: CT BRAIN WO IVCON  
  
CLINICAL HISTORY: Head trauma.   
Flipped a 4 rico without   
helmet.  
Headache.  
  
TECHNIQUE: Serial axial images   
without IV contrast were   
obtained from  
the vertex to the foramen   
magnum.  
MQ: [id number]  
  
CT Dose-Length Product (DLP):   
445 mGy*cm  
CT Dose Reduction Employed: No   
dose reduction techniques were   
required  
  
COMPARISON: None.  
  
  
RESULT:  
  
Post-operative change: None.  
  
Acute change: No evidence of an   
acute infarct or other acute  
parenchymal process.  
  
Hemorrhage: No evidence of acute   
intracranial hemorrhage.  
  
Mass Lesion / Mass Effect: There   
is no evidence of an   
intracranial mass  
or extraaxial fluid collection.   
No significant mass effect.  
  
Chronic change: None apparent.  
  
Parenchyma: There is no   
significant volume loss. The   
brain parenchyma  
is otherwise within normal   
limits for age.  
  
Ventricles: The ventricles are   
within normal limits of size and  
configuration for age.  
  
Paranasal sinuses and skull   
base: The visualized paranasal   
sinuses are  
grossly clear. The skull base   
and imaged soft tissues are   
unremarkable.  
  
 (topogram) images: No   
additional findings.  
  
  
Impression: IMPRESSION:  
  
No evidence for acute   
intracranial injury.  
  
  
  
  
: ABDOUL  
Transcribe Date/Time: 2020 12:34A  
  
Dictated by : HUMBLE ELI MD  
  
This examination was interpreted   
and the report reviewed and  
electronically signed by:  
HUMBLE ELI MD on 2020 1:05AM EST  
  
  
  
  
  
  
  
  
  
MRI Report  
No resulted procedures found.  
  
  
  
  
Hemoglobin (g/dL)  
Date Value  
2022 11.7  
10/09/2021 11.5  
  
  
Hematocrit (%)  
Date Value  
2022 36.4  
10/09/2021 35.5  
  
  
WBC (k/uL)  
Date Value  
2022 5.83  
10/09/2021 16.09  
  
  
Platelet Count (k/uL)  
Date Value  
2022 255  
10/09/2021 276  
  
  
BMP Latest Ref Rng & Units   
2022 10/9/2021 2021  
GLUCOSE 74 - 99 mg/dL 74 88 78  
BUN 7 - 21 mg/dL 7 8 10  
CREATININE 0.58 - 0.96 mg/dL   
0.66 0.63 0.74  
SODIUM 136 - 144 mmol/L 140 138   
140  
POTASSIUM 3.7 - 5.1 mmol/L 3.8   
3.8 4.0  
CHLORIDE 97 - 105 mmol/L 106(H)   
103 104  
CO2 22 - 30 mmol/L 26 22 23  
ANION GAP 9 - 18 mmol/L 8(L) 13   
13  
CALCIUM, TOTAL 8.5 - 10.2 mg/dL   
9.1 9.2 9.7  
eGFR >=60 mL/min/1.73m 130 >60 -  
EGFR- - - >60 -  
EGFR-ALL OTHER RACES . - >60 -  
EGFR-PEDIATRIC FACTOR - -  
  
Assessment/Plan:  
G43.109 Migraine with aura and   
without status migrainosus, not   
intractable (primary encounter   
diagnosis)  
R51.9 New onset headache  
Comment: Pt previously seen for   
concussion presenting today for   
new concern of migraine   
headache. Headaches began   
roughly 3 weeks ago and have   
been occurring daily. She   
reports severe headache roughly   
2 nights ago at which time she   
experienced facial paresthesias   
as well. Headaches are   
retro-orbital or frontal and   
associated with N/V, difficulty   
focusing/blurred vision,   
phonophobia. She does have a   
notable history of concussion   
with last imaging including CT/A   
of brain and CT C-spine in 10/23   
ordered at time of previous OV.   
Exam today in office is   
nonfocal. Treatment thus far has   
been provided by a family   
physician including Fioricet and   
Imitrex prn. She reports no   
improvement with use of Fioricet   
and SCE with Imitrex. She has   
also attempted to use previously   
prescribed Flexeril without   
improvement of symptoms. After   
review of symptoms and   
discussion, we will proceed with   
plan as noted below:  
  
-MRI brain WO/W given new onset   
persistent headache and visual   
disturbance.  
-Begin Topamax 25mg QHS as   
headache preventative (SE   
reviewed and patient aware to   
use backup form of BC while   
taking medication).  
-Continue amitriptyline 10mg as   
previously prescribed by other   
provider which may also act as   
migraine preventive.  
-Discontinue Fioricet and   
Imitrex and instead begin Maxalt   
5mg SL at onset of migraine.   
Should she experience SE she   
will notify the office at which   
time we will transition to   
Nurtec.  
-Medrol pack to attempt to break   
headache cycle.  
  
Reviewed red flag symptoms and   
when she should present to the   
ED including worst headache of   
life or headache with associated   
neurological deficits including   
unilateral weakness, loss of   
sensation, speech changes,   
vision changes (double/loss),   
etc. she will reach out to the   
office should she experience SE   
with medication or ongoing   
headaches. Otherwise, she will   
follow-up in 8 weeks (virtually)   
for review of imaging and   
medication.  
  
Office Visit on 24  
MRI BRAIN WO/W NAYAN Kothari.CNP  
  
I spent a total of 46 minutes on   
the date of the service which   
included preparing to see the   
patient, face-to-face patient   
care, completing clinical   
documentation, obtaining and/or   
reviewing separately obtained   
history, performing a medically   
appropriate examination,   
counseling and educating the   
patient/family/caregiver, and   
ordering medications, tests, or   
procedures.  
  
  
Electronically signed by   
Amalia Marquez APRN.CNP at   
2024 8:54 AM EDT  
documented in this encounter            [facility]  
   
                                        2024 Note     HNO ID: [id number]  
Author: AMALIA MARQUEZ APRN.CNP  
Service: ?  
Author Type: Nurse Practitioner  
Type: Progress Notes  
Filed: 2024 08:54  
Note Text:  
[facility]   
[place]  
Follow-up Visit  
Follow-up note  
August 15, 2024  
HPI: Ms. Rowell presents today   
for a follow-up visit.  
Per her previous visit on   
23:  
S09.90XD Injury of head,   
subsequent encounter (primary   
encounter diagnosis)  
Z87.820 History of concussion  
M54.2 Cervical pain  
R42 Dizziness  
H53.10 Subjective visual   
disturbance  
Comment: Pt with pmh concussion   
(x8), anxiety previously seen   
for concussion.  
She reports she was riding a   
horse that bucked and threw her   
forward. Following  
the event she suffered a severe   
headache, cervical pain,   
peripheral vision loss,  
intermittent blurred vision,   
dizziness/vertigo, and L facial   
numbness. For  
further evaluation CT brain/neck   
and CTA head/neck were completed   
and  
unremarkable. She was started on   
gabapentin 100 mg twice daily   
for treatment of  
symptoms as well as Naproxen   
500mg BID prn. At time of   
appointment today she  
reports that all symptoms have   
resolved. She has since stopped   
taking  
gabapentin without recurrence of   
symptoms. She reports no further   
head  
injuries. As she has no further   
symptoms, no additional work-up   
or treatment  
isnecessary at this time.   
Recommend continuing to avoid   
activities that may  
lead to higher susceptibility of   
head injury. She may follow-up   
as needed.  
Headaches have been going on for   
about 3 weeks; nonstop. Has   
dealt with  
headaches the past couple weeks.   
Happened suddenly and stayed for   
one week.  
Sometimes throws neck/back out   
when riding horses and will go   
to see chiro. Goes  
once monthly for routine   
maintenance. Has gone 3x in the   
past month. No  
improvement at all. The day he   
adjusts her the sx become very   
severe.  
Two nights ago headache was the   
worse pain of her life.  
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   
works at [employer] rec'd heat/ice. Was   
sent a  
prescription for Fioricet by an   
MD at the office.  
Headache pulls on the back of   
her eyes. Stuck around forehead.   
Took 1-2 hours  
after taking medication. Was   
then given Imitrex which she   
took last night. Tried  
Imitrex last night and had   
severe SE; vomiting, muscle   
weakness, felt cold and  
confused. Went to bed but   
headache became severe and woke   
up again at 2:30 and  
hasn't slept since.  
Had n/t on L with severe HA two   
nights ago. Only time this has   
happened. Could  
not focus when driving; no depth   
perception.  
Has tried Tylenol and Aleve;   
avoids NSAIDS d/t ulcer. Uses   
icy hot patches on  
neck.  
HEADACHE LOCATION: frontal   
region bilateral and   
retroorbital bilateral  
Onset: Headaches began 6-8 years   
ago (middle school); only   
occasional headaches  
after that time and no migraines   
for years after  
Quality:  aching ,  pressure ,   
and  stabbing   
Photophobia? No Phonophobia? Yes  
Nausea? Yes Vomitting? Yes  
Aura? Yes blurred vision and   
motor (eye twitching)  
Worse with activity? No  
Frequency: daily headaches but   
as of  just has been very   
severe  
Caffeine intake? Occasional red   
bull (2p/mos); does not help HA  
Water intake? Eight or more 8   
ounce glasses daily.  
Sleep concerns? No  
History of concussion? Yes ; no   
injury since last appt. Wears   
helmet when riding  
regularly.  
Autonomic features: None   
Autonomic headache features -:   
none  
Previous/Current Headache   
treatment  
Preventative: None  
Rescue: Imitrex and Butalbital;   
Excedrin  
Positional? Yes (worse laying   
down); last November/December   
worse  
Double vision? No  
Worse with   
valsalva/sneeze/cough? No  
Plans for pregnancy? No  
Family Hx of migraines,   
aneurysms, brain tumors: Dad   
with migraines; sister with  
headaches and benign tumor; no   
aneurysm  
No focal weakness to   
extremities. When driving with L   
hand L hand can become  
numb. Last night felt dizzy when   
going to throw up.  
PAST MEDICAL HISTORY  
No date: Panic attacks  
PAST SURGICAL HISTORY  
2021: LAPAROSCOPIC   
CHOLECYSTECTOMY  
Current Outpatient Medications   
on File Prior to Visit  
Medication Sig  
Norethin-Eth Estrad Triphasic   
(ORTHO-NOVUM , 28,)   
0.5/0.75/1 mg- 35 mcg  
per tablet Take 1 tablet by   
mouth once daily.  
busPIRone (BUSPAR) 7.5 mg tablet   
Take 7.5 mg by mouth daily at   
bedtime.  
amitriptyline (ELAVIL) 10 mg   
tablet Take 1 tablet by mouth   
daily at bedtime.  
ondansetron (ZOFRAN) 4 mg tablet   
Take 4 mg by mouth every 8 hours   
as needed.  
Taking as needed.  
No current facility-administered   
medications on file prior to   
visit.  
Social History  
Tobacco Use  
Smoking status: Never  
Passive exposure: Never  
Smokeless tobacco: Never  
Tobacco comments:  
VAPING-NICOTINE,DISPOSABLE PODS-   
NOTED 23- SS  
Vaping Use  
Vaping Use: current everyday   
user  
Substances: Nicotine  
Substance Use Topics  
Alcohol use: No  
Drug use: No  
ALLERGIES  
Allergen Reactions  
Welchol [Colesevela* Hives,   
Itching  
Review of Systems:  
Cardiopulmonary: denies chest   
pain, palpitations  
Respiratory: denies shor (more   
content not included)...                [facility]  
   
                                        2024 Note     HNO ID: [id number]  
Author: SOCORRO ZAVALA APRN.CNP  
Service: ?  
Author Type: Nurse Practitioner  
Type: Progress Notes  
Filed: 2024 14:21  
Note Text:  
Chaperone offered: Patient   
declines.  
Deanna Rowell is a 18 year   
old female who presents for   
problem visit vulvar  
cyst for 5 days.  
HPI: notice cyst last Thursday,   
it did drained yesterday and is   
much smaller.  
Also c/o dysuria x 3 days.  
OB History  
 T0  L0  
SAB0 IAB0 Ectopic0 Multiple0   
Live Births0  
Gyn History  
LMP: 2024 (Approximate),   
Having periods  
Age at Menarche:  
Age at First Pregnancy:  
Age at Menopause:  
Gyn History Comments:  
Sexual Activity: Yes; Male  
Contraception: Pill  
PAST MEDICAL HISTORY  
Diagnosis Date  
Panic attacks  
PAST SURGICAL HISTORY  
Procedure Laterality Date  
LAPAROSCOPIC CHOLECYSTECTOMY   
2021  
FAMILY HISTORY  
Problem Relation Age of Onset  
No Known Problems Father  
No Known Problems Mother  
No Known Problems Brother  
No Known Problems Maternal   
Grandfather  
Hypertension Maternal   
Grandmother  
Stroke Maternal Grandmother  
Heart Attack Paternal   
Grandfather  
No Known Problems Paternal   
Grandmother  
Hypertension Other  
maternal side  
Systemic Lupus Erythematosus   
Maternal Aunt  
Coronary Artery Disease No   
Family History  
Hyperlipidemia No Family History  
Diabetes No Family History  
Thyroid No Family History  
Blood Disease No Family History  
Blood Clots No Family History  
Factor 5 Leiden No Family   
History  
DVT No Family History  
Rheumatologic disease No Family   
History  
Mental illness No Family History  
Depression No Family History  
Bipolar disorder No Family   
History  
Schizophrenia No Family History  
Alzheimer's Disease No Family   
History  
Dementia No Family History  
Parkinson?s Disease No Family   
History  
Tremor No Family History  
Essential Tremor No Family   
History  
Aneurysm No Family History  
COPD No Family History  
Kidney Disease No Family History  
Breast Cancer No Family History  
Cervical Cancer No Family   
History  
Uterine Cancer No Family History  
Ovarian cancer No Family History  
Social History  
Tobacco Use  
Smoking status: Never  
Passive exposure: Never  
Smokeless tobacco: Never  
Tobacco comments:  
VAPING-NICOTINE,DISPOSABLE PODS-   
NOTED 23- SS  
Vaping Use  
Vaping Use: current everyday   
user  
Substances: Nicotine  
Substance Use Topics  
Alcohol use: No  
Drug use: No  
Current Outpatient Medications  
Medication Sig  
Norethin-Eth Estrad Triphasic   
(ORTHO-NOVUM , 28,)   
0.5/0.75/1 mg- 35 mcg  
per tablet Take 1 tablet by   
mouth once daily.  
busPIRone (BUSPAR) 7.5 mg tablet   
Take 7.5 mg by mouth daily at   
bedtime.  
amitriptyline (ELAVIL) 10 mg   
tablet Take 1 tablet by mouth   
daily at bedtime.  
ondansetron (ZOFRAN) 4 mg tablet   
Take 4 mg by mouth every 8 hours   
as needed.  
Taking as needed.  
No current facility-administered   
medications for this visit.  
Allergies As of Date: 2024  
Allergen Noted Reaction  
WELCHOL [COLESEVELAM] 2022   
Hives and Itching  
Fully Assessed 2024  
REVIEW OF SYSTEMS  
Bladder: No gross hematuria,   
urinary frequency, urinary   
urgency, or  
incontinence.  
Expanded ROS: N/A  
Allergies and current medication   
updated:Yes  
EXAM: Wt 132 lb 12.8 oz (60.2kg)   
  LMP 2024  
GENERAL: pleasant,    
female in no apparent distress  
HEENT: Normocephalic,   
atraumatic, mucus membranes   
moist, and no lesions  
CHEST: Normal inspiratory effort  
PELVIC: external genitalia   
normal, normal Bartholin's   
glands, urethra, Pearsall's  
glands, physiologic discharge   
present, normal appearing   
perineal body and  
perianal region, resolving   
folliculitis  
NEURO: alert and oriented   
x3,exam grossly non-focal  
EXTREMITIES: normal  
ASSESSMENT/PLAN:  
1. Folliculitis - ICD9: 704.8,   
ICD10: L73.9 (primary diagnosis)  
resolving  
2. Dysuria - ICD9: 788.1, ICD10:   
R30.0  
acute  
- Send urine for culture  
- Patient education for   
prevention given  
- URINE CULTURE  
Will notify patient of test   
results.  
NAYAN Zamora.BRIAN  
Medical Decision Making:  
Problems:  
Low: Acute, uncomplicated   
illness or injury  
Data:  
Unique test(s) ordered: 2  
Risk:  
Low: Low risk from   
testing/treatment  
Medical Decision Making Level: 3   
- Low                                   [facility]  
   
                                                    2024 History of   
Present illness Narrative               Formatting of this note is   
different from the original.  
Chaperone offered: Patient   
declines.  
  
Deanna Rowell is a 18 year   
old female who presents for   
problem visit vulvar cyst for 5   
days.  
  
HPI: notice cyst last Thursday,   
it did drained yesterday and is   
much smaller. Also c/o dysuria x   
3 days.  
  
OB History  
 T0  L0  
SAB0 IAB0 Ectopic0 Multiple0   
Live Births0  
  
Gyn History  
  
LMP: 2024 (Approximate),   
Having periods  
Age at Menarche:  
Age at First Pregnancy:  
Age at Menopause:  
Gyn History Comments:  
Sexual Activity: Yes; Male  
Contraception: Pill  
  
  
  
PAST MEDICAL HISTORY  
Diagnosis Date  
Panic attacks  
  
PAST SURGICAL HISTORY  
Procedure Laterality Date  
LAPAROSCOPIC CHOLECYSTECTOMY   
2021  
  
FAMILY HISTORY  
Problem Relation Age of Onset  
No Known Problems Father  
No Known Problems Mother  
No Known Problems Brother  
No Known Problems Maternal   
Grandfather  
Hypertension Maternal   
Grandmother  
Stroke Maternal Grandmother  
Heart Attack Paternal   
Grandfather  
No Known Problems Paternal   
Grandmother  
Hypertension Other  
maternal side  
Systemic Lupus Erythematosus   
Maternal Aunt  
Coronary Artery Disease No   
Family History  
Hyperlipidemia No Family History  
Diabetes No Family History  
Thyroid No Family History  
Blood Disease No Family History  
Blood Clots No Family History  
Factor 5 Leiden No Family   
History  
DVT No Family History  
Rheumatologic disease No Family   
History  
Mental illness No Family History  
Depression No Family History  
Bipolar disorder No Family   
History  
Schizophrenia No Family History  
Alzheimer's Disease No Family   
History  
Dementia No Family History  
Parkinson s Disease No Family   
History  
Tremor No Family History  
Essential Tremor No Family   
History  
Aneurysm No Family History  
COPD No Family History  
Kidney Disease No Family History  
Breast Cancer No Family History  
Cervical Cancer No Family   
History  
Uterine Cancer No Family History  
Ovarian cancer No Family History  
  
Social History  
  
Tobacco Use  
Smoking status: Never  
Passive exposure: Never  
Smokeless tobacco: Never  
Tobacco comments:  
VAPING-NICOTINE,DISPOSABLE PODS-   
NOTED 23- SS  
Vaping Use  
Vaping Use: current everyday   
user  
Substances: Nicotine  
Substance Use Topics  
Alcohol use: No  
Drug use: No  
  
Current Outpatient Medications  
Medication Sig  
Norethin-Eth Estrad Triphasic   
(ORTHO-NOVUM , 28,)   
0.5/0.75/1 mg- 35 mcg per tablet   
Take 1 tablet by mouth once   
daily.  
busPIRone (BUSPAR) 7.5 mg tablet   
Take 7.5 mg by mouth daily at   
bedtime.  
amitriptyline (ELAVIL) 10 mg   
tablet Take 1 tablet by mouth   
daily at bedtime.  
ondansetron (ZOFRAN) 4 mg tablet   
Take 4 mg by mouth every 8 hours   
as needed. Taking as needed.  
  
No current facility-administered   
medications for this visit.  
  
Allergies As of Date: 2024  
Allergen Noted Reaction  
WELCHOL [COLESEVELAM] 2022   
Hives and Itching  
  
Fully Assessed 2024  
  
REVIEW OF SYSTEMS  
Bladder: No gross hematuria,   
urinary frequency, urinary   
urgency, or incontinence.  
  
Expanded ROS: N/A  
Allergies and current medication   
updated:Yes  
  
EXAM: Wt 132 lb 12.8 oz (60.2kg)   
  LMP 2024  
  
  
GENERAL: pleasant,    
female in no apparent distress  
HEENT: Normocephalic,   
atraumatic, mucus membranes   
moist, and no lesions  
CHEST: Normal inspiratory effort  
PELVIC: external genitalia   
normal, normal Bartholin's   
glands, urethra, Pearsall's glands,   
physiologic discharge present,   
normal appearing perineal body   
and perianal region, resolving   
folliculitis  
NEURO: alert and oriented   
x3,exam grossly non-focal  
EXTREMITIES: normal  
  
ASSESSMENT/PLAN:  
1. Folliculitis - ICD9: 704.8,   
ICD10: L73.9 (primary diagnosis)  
resolving  
  
2. Dysuria - ICD9: 788.1, ICD10:   
R30.0  
acute  
- Send urine for culture  
- Patient education for   
prevention given  
- URINE CULTURE  
Will notify patient of test   
results.  
  
Socorro Zavala APRN.CNP  
  
Medical Decision Making:  
  
Problems:  
Low: Acute, uncomplicated   
illness or injury  
Data:  
Unique test(s) ordered: 2  
Risk:  
Low: Low risk from   
testing/treatment  
  
Medical Decision Making Level: 3   
- Low  
  
  
Electronically signed by   
Socorro Zavala APRN.CNP at   
2024 2:21 PM EDT  
documented in this encounter            [facility]  
   
                                                    2024 Miscellaneous   
Notes                                   Formatting of this note might be   
different from the original.  
This patient gave consent to   
this Medical Advice Message and   
is aware that it may result in a   
bill to their insurance, as well   
as the possibility of receiving   
a bill for a copay and/or   
deductible. They are an   
established patient, but are not   
seeking information exclusively   
about a problem treated during   
an in person or video visit in   
the last seven days. I did not   
recommend an in person or video   
visit within seven days of my   
reply.  
  
See the [email or web address] message reply   
for my assessment and plan.  
  
I spent a total of 11 minutes   
reviewing the patient's prior   
medical records and current   
request for medical advice,   
prescribing medications or   
ordering tests (if applicable),   
replying to the patient, and   
documenting the encounter.  
  
Electronically signed by Bijan Reeves MD at 2024 7:47 AM   
EDT  
documented in this encounter            [facility]  
   
                                                    2024 Miscellaneous   
Notes                                   Formatting of this note is   
different from the original.  
Last office visit 24  
  
Requested Prescriptions  
  
Pending Prescriptions Disp   
Refills  
Norethin-Eth Estrad Triphasic   
(ORTHO-NOVUM , ,)   
0.5/0.75/1 mg- 35 mcg per tablet   
84 tablet 3  
Sig: Take 1 tablet by mouth once   
daily.  
  
ARIN ENCISO RN  
  
  
Electronically signed by   
Arin Enciso RN at   
2024 4:02 PM EDT  
documented in this encounter            [facility]  
   
                                        2024 Note     HNO ID: [id number]  
Author: SOCORRO ZAVALA APRN.CNP  
Service: ?  
Author Type: Nurse Practitioner  
Type: Progress Notes  
Filed: 2024 13:47  
Note Text:  
Chaperone offered: Patient   
declines.  
Deanna Rowell is a 18 year   
old female who presents for   
ongoing pelvic pain  
HPI: pt had a normal pelvic US   
and present today for vaginal   
cultures to rule  
out any infection. She has   
continue to have a sharp   
stabbing pain.  
OB History  
 T0  L0  
SAB0 IAB0 Ectopic0 Multiple0   
Live Births0  
Gyn History  
LMP: 2024 (Approximate),   
Having periods  
Age at Menarche:  
Age at First Pregnancy:  
Age at Menopause:  
Gyn History Comments:  
Sexual Activity: Yes; Male  
Contraception: Pill  
PAST MEDICAL HISTORY  
Diagnosis Date  
Panic attacks  
PAST SURGICAL HISTORY  
Procedure Laterality Date  
LAPAROSCOPIC CHOLECYSTECTOMY   
2021  
FAMILY HISTORY  
Problem Relation Age of Onset  
No Known Problems Father  
No Known Problems Mother  
No Known Problems Brother  
No Known Problems Maternal   
Grandfather  
Hypertension Maternal   
Grandmother  
Stroke Maternal Grandmother  
Heart Attack Paternal   
Grandfather  
No Known Problems Paternal   
Grandmother  
Hypertension Other  
maternal side  
Systemic Lupus Erythematosus   
Maternal Aunt  
Coronary Artery Disease No   
Family History  
Hyperlipidemia No Family History  
Diabetes No Family History  
Thyroid No Family History  
Blood Disease No Family History  
Blood Clots No Family History  
Factor 5 Leiden No Family   
History  
DVT No Family History  
Rheumatologic disease No Family   
History  
Mental illness No Family History  
Depression No Family History  
Bipolar disorder No Family   
History  
Schizophrenia No Family History  
Alzheimer's Disease No Family   
History  
Dementia No Family History  
Parkinson?s Disease No Family   
History  
Tremor No Family History  
Essential Tremor No Family   
History  
Aneurysm No Family History  
COPD No Family History  
Kidney Disease No Family History  
Breast Cancer No Family History  
Cervical Cancer No Family   
History  
Uterine Cancer No Family History  
Ovarian cancer No Family History  
Social History  
Tobacco Use  
Smoking status: Never  
Passive exposure: Never  
Smokeless tobacco: Never  
Tobacco comments:  
VAPING-NICOTINE,DISPOSABLE PODS-   
NOTED 23- SS  
Vaping Use  
Vaping Use: current everyday   
user  
Substances: Nicotine  
Substance Use Topics  
Alcohol use: No  
Drug use: No  
Current Outpatient Medications  
Medication Sig  
busPIRone (BUSPAR) 7.5 mg tablet   
Take 7.5 mg by mouth daily at   
bedtime.  
amitriptyline (ELAVIL) 10 mg   
tablet Take 1 tablet by mouth   
daily at bedtime.  
ondansetron (ZOFRAN) 4 mg tablet   
Take 4 mg by mouth every 8 hours   
as needed.  
Taking as needed.  
Norethin-Eth Estrad Triphasic   
(ORTHO-NOVUM , 28,)   
0.5/0.75/1 mg- 35 mcg  
per tablet Take 1 tablet by   
mouth once daily.  
No current facility-administered   
medications for this visit.  
Allergies As of Date: 2024  
Allergen Noted Reaction  
WELCHOL [COLESEVELAM] 2022   
Hives and Itching  
Fully Assessed 2024  
REVIEW OF SYSTEMS  
Abdomen: No bloating, early   
satiety, indigestion, or   
increased flatulence. No  
nausea, vomiting, diarrhea, or   
constipation.  
Bladder: No dysuria, gross   
hematuria, urinary frequency,   
urinary urgency, or  
incontinence.  
Expanded ROS: N/A  
Allergies and current medication   
updated:Yes  
EXAM: /64   Wt 141 lb 12.8   
oz (64.3kg)   LMP 2024  
GENERAL: pleasant,    
female in no apparent distress  
HEENT: Normocephalic,   
atraumatic, mucus membranes   
moist, and no lesions  
CHEST: Normal inspiratory effort  
PELVIC: external genitalia   
normal, normal Bartholin's   
glands, urethra, Pearsall's  
glands, no vulvar lesions, no   
cervical lesions, good vaginal   
support,  
physiologic discharge present,   
normal appearing perineal body   
and perianal  
region  
BIMANUAL: deferred  
NEURO: alert and oriented   
x3,exam grossly non-focal  
EXTREMITIES: normal  
ASSESSMENT/PLAN:  
1. Pelvic pain in female - ICD9:   
625.9, ICD10: R10.2  
- BACTERIAL VAGINOSIS NAAT  
- CANDIDA/TRICHOMONAS NAAT  
- GONORRHEA/CHLAMYDIA NAAT  
- MYCOPLASMA CULT  
Will notify patient of test   
results.  
If results are negative will try   
to get MRI ??endometriosis  
Socorro Zavala, APRN.CNP  
Medical Decision Making:  
Problems:  
Moderate: New problem with   
uncertain prognosis  
Data:  
Unique test(s) ordered: 3+  
Risk:  
Low: Low risk from   
testing/treatment  
Medical Decision Making Level: 4   
- Moderate                              [facility]  
   
                                                    2024 History of   
Present illness Narrative               Formatting of this note is   
different from the original.  
Chaperone offered: Patient   
declines.  
  
Deanna Rowell is a 18 year   
old female who presents for   
ongoing pelvic pain  
  
HPI: pt had a normal pelvic US   
and present today for vaginal   
cultures to rule out any   
infection. She has continue to   
have a sharp stabbing pain.  
  
OB History  
 T0  L0  
SAB0 IAB0 Ectopic0 Multiple0   
Live Births0  
  
Gyn History  
  
LMP: 2024 (Approximate),   
Having periods  
Age at Menarche:  
Age at First Pregnancy:  
Age at Menopause:  
Gyn History Comments:  
Sexual Activity: Yes; Male  
Contraception: Pill  
  
  
  
PAST MEDICAL HISTORY  
Diagnosis Date  
Panic attacks  
  
PAST SURGICAL HISTORY  
Procedure Laterality Date  
LAPAROSCOPIC CHOLECYSTECTOMY   
2021  
  
FAMILY HISTORY  
Problem Relation Age of Onset  
No Known Problems Father  
No Known Problems Mother  
No Known Problems Brother  
No Known Problems Maternal   
Grandfather  
Hypertension Maternal   
Grandmother  
Stroke Maternal Grandmother  
Heart Attack Paternal   
Grandfather  
No Known Problems Paternal   
Grandmother  
Hypertension Other  
maternal side  
Systemic Lupus Erythematosus   
Maternal Aunt  
Coronary Artery Disease No   
Family History  
Hyperlipidemia No Family History  
Diabetes No Family History  
Thyroid No Family History  
Blood Disease No Family History  
Blood Clots No Family History  
Factor 5 Leiden No Family   
History  
DVT No Family History  
Rheumatologic disease No Family   
History  
Mental illness No Family History  
Depression No Family History  
Bipolar disorder No Family   
History  
Schizophrenia No Family History  
Alzheimer's Disease No Family   
History  
Dementia No Family History  
Parkinson s Disease No Family   
History  
Tremor No Family History  
Essential Tremor No Family   
History  
Aneurysm No Family History  
COPD No Family History  
Kidney Disease No Family History  
Breast Cancer No Family History  
Cervical Cancer No Family   
History  
Uterine Cancer No Family History  
Ovarian cancer No Family History  
  
Social History  
  
Tobacco Use  
Smoking status: Never  
Passive exposure: Never  
Smokeless tobacco: Never  
Tobacco comments:  
VAPING-NICOTINE,DISPOSABLE PODS-   
NOTED 23- SS  
Vaping Use  
Vaping Use: current everyday   
user  
Substances: Nicotine  
Substance Use Topics  
Alcohol use: No  
Drug use: No  
  
Current Outpatient Medications  
Medication Sig  
busPIRone (BUSPAR) 7.5 mg tablet   
Take 7.5 mg by mouth daily at   
bedtime.  
amitriptyline (ELAVIL) 10 mg   
tablet Take 1 tablet by mouth   
daily at bedtime.  
ondansetron (ZOFRAN) 4 mg tablet   
Take 4 mg by mouth every 8 hours   
as needed. Taking as needed.  
Norethin-Eth Estrad Triphasic   
(ORTHO-NOVUM 7/7/7, 28,)   
0.5/0.75/1 mg- 35 mcg per tablet   
Take 1 tablet by mouth once   
daily.  
  
No current facility-administered   
medications for this visit.  
  
Allergies As of Date: 2024  
Allergen Noted Reaction  
WELCHOL [COLESEVELAM] 2022   
Hives and Itching  
  
Fully Assessed 2024  
  
REVIEW OF SYSTEMS  
Abdomen: No bloating, early   
satiety, indigestion, or   
increased flatulence. No nausea,   
vomiting, diarrhea, or   
constipation.  
Bladder: No dysuria, gross   
hematuria, urinary frequency,   
urinary urgency, or   
incontinence.  
  
Expanded ROS: N/A  
Allergies and current medication   
updated:Yes  
  
EXAM: /64   Wt 141 lb 12.8   
oz (64.3kg)   LMP 2024  
  
  
GENERAL: pleasant,    
female in no apparent distress  
HEENT: Normocephalic,   
atraumatic, mucus membranes   
moist, and no lesions  
CHEST: Normal inspiratory effort  
PELVIC: external genitalia   
normal, normal Bartholin's   
glands, urethra, Pearsall's glands,   
no vulvar lesions, no cervical   
lesions, good vaginal support,   
physiologic discharge present,   
normal appearing perineal body   
and perianal region  
BIMANUAL: deferred  
NEURO: alert and oriented   
x3,exam grossly non-focal  
EXTREMITIES: normal  
  
ASSESSMENT/PLAN:  
1. Pelvic pain in female - ICD9:   
625.9, ICD10: R10.2  
- BACTERIAL VAGINOSIS NAAT  
- CANDIDA/TRICHOMONAS NAAT  
- GONORRHEA/CHLAMYDIA NAAT  
- MYCOPLASMA CULT  
Will notify patient of test   
results.  
  
If results are negative will try   
to get MRI ??endometriosis  
  
Socorro Zavala APRN.CNP  
  
Medical Decision Making:  
  
Problems:  
Moderate: New problem with   
uncertain prognosis  
Data:  
Unique test(s) ordered: 3+  
Risk:  
Low: Low risk from   
testing/treatment  
  
Medical Decision Making Level: 4   
- Moderate  
  
  
Electronically signed by   
Socorro Zavala APRN.CNP at   
2024 1:47 PM EST  
documented in this encounter            [facility]  
   
                                                    2024 Miscellaneous   
Notes                                   Formatting of this note might be   
different from the original.  
Patient notified  
Electronically signed by   
Ana Lopez LPN at   
2024 2:54 PM EST  
Formatting of this note might be   
different from the original.  
Please let the patient know that   
her pelvic ultrasound is normal.   
If she is still having the   
pelvic pain I would recommend   
that she returns for vaginal   
cultures to rule out any type of   
infection.  
Socorro Zavala APRN.CNP  
Electronically signed by   
Socorro Zavala APRN.CNP at   
2024 10:13 AM EST  
documented in this encounter            [facility]  
   
                                                    2024 History of   
Present illness Narrative               Formatting of this note might be   
different from the original.  
Radiology Service Progress Note  
  
PATIENT NAME: Deanna Rowell  
MRN: [id number]  
  
DATE OF SERVICE: 2024  
TIME: 9:47 AM  
PATIENT IDENTITY VERIFICATION   
COMPLETED USING TWO (2)   
IDENTIFIERS: Name and Date of   
Birth confirmed by patient   
verbally.  
FALL SCREENING: Has the patient   
had 2 falls in the last year or   
1 fall with injury or currently   
using an Ambulatory Assistive   
Device (Walker, Cane,   
Wheelchair, Crutches, etc.)? No  
PATIENT GENDER DATA: Female.   
Pregnancy status: Pregnant: No   
Breastfeeding status: NO.  
PATIENT RELEVANT IMPLANT DATA   
REVIEWED: Not Applicable  
PATIENT PRESENTS WITH AN   
IMPLANTABLE OR ATTACHED MEDICAL   
DEVICE: No  
  
RADIOLOGY DEPARTMENT: Ultrasound  
  
PERIPHERAL IV DATA: Not   
applicable  
  
SIGNED BY: Marianna Kilpatrick RDMS  
2024 9:47 AM  
  
  
Electronically signed by Marianna Kilpatrick RDMS at 2024 9:47   
AM EST  
documented in this encounter            [facility]  
   
                                        2024 Note     HNO ID: [id number]  
Author: MARIANNA KILPATRICK RDMS  
Service: ?  
Author Type: Technician  
Type: Progress Notes  
Filed: 2024 09:47  
Note Text:  
Radiology Service Progress Note  
PATIENT NAME: Deanna Rowell  
MRN: [id number]  
DATE OF SERVICE: 2024  
TIME: 9:47 AM  
PATIENT IDENTITY VERIFICATION   
COMPLETED USING TWO (2)   
IDENTIFIERS: Name and  
Date of Birth confirmed by   
patient verbally.  
FALL SCREENING: Has the patient   
had 2 falls in the last year or   
1 fall with  
injury or currently using an   
Ambulatory Assistive Device   
(Walker, Cane,  
Wheelchair, Crutches, etc.)? No  
PATIENT GENDER DATA: Female.   
Pregnancy status: Pregnant: No   
Breastfeeding  
status: NO.  
PATIENT RELEVANT IMPLANT DATA   
REVIEWED: Not Applicable  
PATIENT PRESENTS WITH AN   
IMPLANTABLE OR ATTACHED MEDICAL   
DEVICE: No  
RADIOLOGY DEPARTMENT: Ultrasound  
PERIPHERAL IV DATA: Not   
applicable  
SIGNED BY: Marianna Kilpatrick RDMS  
2024 9:47 AM                [facility]  
   
                                        2024 Note     HNO ID: [id number]  
Author: SOCORRO ZAVALA APRN.CNP  
Service: ?  
Author Type: Nurse Practitioner  
Type: Progress Notes  
Filed: 2024 09:40  
Note Text:  
Chaperone offered: Patient   
declines.  
Deanna Rowell is a 18 year   
old female who presents for   
problem visit pelvic  
pain for 2 month(s).  
HPI: sharp stabbing pain that is   
random at times, the pain does   
happen with  
intercourse. Pt denies any   
bleeding or concerns for STDs.  
OB History  
 T0  L0  
SAB0 IAB0 Ectopic0 Multiple0   
Live Births0  
Gyn History  
LMP: 2024 (Approximate),   
Having periods  
Age at Menarche:  
Age at First Pregnancy:  
Age at Menopause:  
Gyn History Comments:  
Sexual Activity: Yes; Male  
Contraception: Pill  
PAST MEDICAL HISTORY  
Diagnosis Date  
Panic attacks  
PAST SURGICAL HISTORY  
Procedure Laterality Date  
LAPAROSCOPIC CHOLECYSTECTOMY   
2021  
FAMILY HISTORY  
Problem Relation Age of Onset  
No Known Problems Father  
No Known Problems Mother  
No Known Problems Brother  
No Known Problems Maternal   
Grandfather  
Hypertension Maternal   
Grandmother  
Stroke Maternal Grandmother  
Heart Attack Paternal   
Grandfather  
No Known Problems Paternal   
Grandmother  
Hypertension Other  
maternal side  
Systemic Lupus Erythematosus   
Maternal Aunt  
Coronary Artery Disease No   
Family History  
Hyperlipidemia No Family History  
Diabetes No Family History  
Thyroid No Family History  
Blood Disease No Family History  
Blood Clots No Family History  
Factor 5 Leiden No Family   
History  
DVT No Family History  
Rheumatologic disease No Family   
History  
Mental illness No Family History  
Depression No Family History  
Bipolar disorder No Family   
History  
Schizophrenia No Family History  
Alzheimer's Disease No Family   
History  
Dementia No Family History  
Parkinson?s Disease No Family   
History  
Tremor No Family History  
Essential Tremor No Family   
History  
Aneurysm No Family History  
COPD No Family History  
Kidney Disease No Family History  
Breast Cancer No Family History  
Cervical Cancer No Family   
History  
Uterine Cancer No Family History  
Ovarian cancer No Family History  
Social History  
Tobacco Use  
Smoking status: Never  
Passive exposure: Never  
Smokeless tobacco: Never  
Tobacco comments:  
VAPING-NICOTINE,DISPOSABLE PODS-   
NOTED 23- SS  
Vaping Use  
Vaping Use: current everyday   
user  
Substances: Nicotine  
Substance Use Topics  
Alcohol use: No  
Drug use: No  
Current Outpatient Medications  
Medication Sig  
amitriptyline (ELAVIL) 10 mg   
tablet Take 1 tablet by mouth   
daily at bedtime.  
ondansetron (ZOFRAN) 4 mg tablet   
Take 4 mg by mouth every 8 hours   
as needed.  
Taking as needed.  
Norethin-Eth Estrad Triphasic   
(ORTHO-NOVUM , 28,)   
0.5/0.75/1 mg- 35 mcg  
per tablet Take 1 tablet by   
mouth once daily.  
busPIRone (BUSPAR) 7.5 mg tablet   
Take 7.5 mg by mouth daily at   
bedtime.  
gabapentin (NEURONTIN) 100 mg   
capsule Take 3 capsules by mouth   
two times a day  
for 90 days. (Patient not   
taking: Reported on 2023)  
No current facility-administered   
medications for this visit.  
Allergies As of Date: 2024  
Allergen Noted Reaction  
WELCHOL [COLESEVELAM] 2022   
Hives and Itching  
Fully Assessed 2024  
REVIEW OF SYSTEMS  
Abdomen: No bloating, early   
satiety, indigestion, or   
increased flatulence. No  
nausea, vomiting, diarrhea, or   
constipation.  
Bladder: No dysuria, gross   
hematuria, urinary frequency,   
urinary urgency, or  
incontinence.  
Expanded ROS: N/A  
Allergies and current medication   
updated:Yes  
EXAM: Wt 141 lb 3.2 oz (64.0kg)   
  LMP 2024  
GENERAL: pleasant,    
female in no apparent distress  
HEENT: Normocephalic,   
atraumatic, mucus membranes   
moist, and no lesions  
CHEST: Normal inspiratory effort  
NEURO: alert and oriented   
x3,exam grossly non-focal  
EXTREMITIES: normal  
ASSESSMENT/PLAN:  
1. Pelvic pain in female - ICD9:   
625.9, ICD10: R10.2  
- US FEMALE PELVIS TRANSVAG  
- if results are negative will   
consider vaginal cultures  
Will notify patient of test   
results.  
Socorro Zavala, NAYAN.CNP  
Medical Decision Making:  
Problems:  
Moderate: New problem with   
uncertain prognosis  
Data:  
Unique test(s) ordered: 1  
Risk:  
Low: Low risk from   
testing/treatment  
Medical Decision Making Level: 3   
- Low                                   [facility]  
   
                                                    2023 History of   
Present illness Narrative               Formatting of this note is   
different from the original.  
Images from the original note   
were not included.  
  
  
[facility]   
[place]  
Follow-up Visit  
  
  
Follow-up note  
  
2023  
  
HPI: Ms. Rowell presents today   
for a follow-up visit.  
  
Per her previous visit on   
10/13/23:  
S09.90XD Injury of head,   
subsequent encounter (primary   
encounter diagnosis)  
Z87.820 History of concussion  
M54.2 Cervical pain  
R42 Dizziness  
H53.10 Subjective visual   
disturbance  
Comment: Pt with pmh concussion   
(x8), anxiety presenting today   
for follow-up after concussion.   
She reports that roughly 3 weeks   
ago she was riding a horse that   
bucked and threw her forward.   
She did not strike her head but   
does report that following this   
event she suffered a severe   
headache. Headache has persisted   
since that time. She also   
reports intermittent cervical   
pain and has been following with   
her chiropractor who she states   
diagnosed her with whiplash.   
Notably, she reports that more   
recently she had an episode of   
loss of peripheral vision and   
intermittent blurred vision as   
well as persistent vertigo. She   
also experienced an episode of L   
facial numbness which has since   
resolved. We will proceed with   
plan as follows  
  
-CT brain/neck to evaluate for   
posttraumatic changes given   
recent cervical strain as well   
as past history of multiple head   
injuries.  
-CTA head/neck given   
neurological deficits (loss of   
sensation, visual deficit,   
vertigo and recent chiropractic   
work on C spine) to evaluate for   
vessel changes such as   
dissection.  
-Begin gabapentin 100 mg BID for   
treatment of headaches, vertigo,   
cervical pain. Will increase to   
200mg after one week and again   
to 300mg after one week if no   
SE.  
-Begin naproxen 500 mg BID as   
needed at onset of headache for   
headache . She should   
not take more than 3 days/week   
or in combination with other   
NSAIDs.  
-May continue Flexeril as needed   
as prescribed by her   
chiropractor for cervical pain.  
-Consult to physical therapy for   
improvement of cervical pain,   
vertigo, and relief of   
headaches.  
  
Discussed concussion as well as   
expected SE and methods for   
assisting/expediting the   
recovery process. Have   
encouraged her to avoid   
increased physical/mental   
exertion (horseback riding)   
until postconcussion symptoms   
have improved.  
  
Recommend continuing to avoid   
activities that may lead to   
higher susceptibility of head   
injury as well as activities   
that may exacerbate her symptoms   
(bright lights, loud sounds,   
irregular sleep schedule,   
etc.).She will follow-up in 4- 6   
weeks at which time we will   
reassess symptoms and determine   
if further intervention is   
needed. If experiencing red flag   
symptoms (including worst   
headache of life, focal   
weakness, return of sensory   
changes, vision or speech   
changes) she should present to   
the ED.  
  
  
Has not had a headache in three   
weeks. Maybe had one when at   
home and not felt it was due to   
head injury. No further vision   
changes. No dizziness. No facial   
numbness.  
Feels gabapentin has been   
helpful in regards to dizziness.   
Was taking one per day. No   
longer taking medication as of   
two weeks ago. Hasn't needed to   
use naproxen. Has not needed   
muscle relaxant.  
Did see chiro last week; will be   
going back in six months.  
Was able to work normal hours.  
Has been riding without   
difficulty. Wearing a helmet.   
Has been able to focus. Work is   
going ok.  
No further head injuries.  
  
PAST MEDICAL HISTORY  
Diagnosis Date  
Panic attacks  
  
PAST SURGICAL HISTORY  
Procedure Laterality Date  
LAPAROSCOPIC CHOLECYSTECTOMY   
2021  
  
Current Outpatient Medications   
on File Prior to Visit  
Medication Sig  
amitriptyline (ELAVIL) 10 mg   
tablet Take 1 tablet by mouth   
daily at bedtime.  
gabapentin (NEURONTIN) 100 mg   
capsule Take 3 capsules by mouth   
two times a day for 90 days.  
naproxen (NAPROSYN) 500 mg   
tablet Take 1 tablet by mouth   
two times a day as needed (for   
pain. Take with food.).  
ondansetron (ZOFRAN) 4 mg tablet   
Take 4 mg by mouth every 8 hours   
as needed. Taking as needed.  
Norethin-Eth Estrad Triphasic   
(ORTHO-NOVUM , ,)   
0.5/0.75/1 mg- 35 mcg per tablet   
Take 1 tablet by mouth once   
daily.  
  
No current facility-administered   
medications on file prior to   
visit.  
  
Social History  
  
Tobacco Use  
Smoking status: Every Day  
Types: Cigarettes  
Passive exposure: Yes  
Smokeless tobacco: Never  
Tobacco comments:  
Vaping  
Vaping Use  
Vaping Use: current everyday   
user  
Substances: Nicotine  
Substance Use Topics  
Alcohol use: No  
Drug use: No  
  
ALLERGIES  
Allergen Reactions  
Amoxicillin Rash  
Welchol [Colesevela* Hives,   
Itching  
  
Review of Systems:  
ENT: denies loss of hearing,   
vertigo  
Vision: denies blurring vison,   
double vision/diplopia  
Cardiopulmonary: denies chest   
pain, palpitations  
Respiratory: denies shortness of   
breath  
GI: denies recent nausea,   
vomiting, diarrhea, constipation  
: denies incontinence  
Psych: denies depression, +   
anxiety  
Sleep: denies issues with   
sleeping  
Heme: denies easy   
bruising/bleeding  
Musculoskeletal: denies weakness  
Back/spine: denies + low back or   
cervical pains  
Neuro: denies tremors, loss of   
feeling, + dizziness, seizure,   
blackout, paresthesia, facial   
paresthesia, facial weakness,   
difficulty in speech, slurring   
of words, dysarthria, dysphagia,   
memory loss, headache  
  
Physical Exam:  
  
23  
1034  
BP: 118/74  
BP Site: Left Arm  
BP Position: Sitting  
BP Cuff Size: Regular Adult  
  
Patient is alert and in no   
distress. Dress is appropriate.   
Mood is appropriate  
Breathing appears regular and   
unstressed  
  
Neurologic examination:  
  
Cognitively intact. No deficits.   
No formal MOCA performed.  
CN: Pupils equal and reactive to   
light, extraocular movements   
intact with no nystagmus, no   
facial weakness, hearing intact   
bilaterally, tongue is midline   
with no deviation, shoulder   
shrug is symmetric.  
Motor exam shows 5/5 strength   
symmetric through the upper and   
lower extremities in all groups   
tested.  
Sensory intact to light touch   
and temperature in all   
extremities. Vibratory sensation   
is intact and symmetric all   
extremities.  
Deep tendon reflexes are   
decreased symmetrically at the   
biceps, brachioradialis,   
triceps, patella, and achilles   
bilaterally.  
Coordination: No dysmetria on   
finger to nose. No tremors   
noted. No drift seen.  
Gait normal in stance and   
pattern.  
Tandem without imbalance.  
Negative Romberg.  
  
Labs/studies:  
  
CT Brain Report  
  
CTA HEAD W IVCON Exam End:   
10/27/2023 12:08 PM (Final   
result)  
Narrative: * * *Final Report* *   
*  
  
DATE OF EXAM: Oct 27 2023   
12:08PM  
  
[facility] 0022 - CTA HEAD W IVCON /   
ACCESSION # [id number]  
  
PROCEDURE REASON: multiple   
diagnoses  
  
* * * * Physician Interpretation   
* * * *  
  
EXAMINATION: CT CERVICAL SPINE   
WO IVCON, CTA HEAD W IVCON, CTA   
NECK W  
IVCON, CT BRAIN WO IVCON  
  
HISTORY: Recent riding injury   
with subsequent headaches,   
peripheral  
visual field loss and blurred   
vision suggesting concussion.  
  
TECHNIQUE: Routine CT of the   
brain without IV contrast. Next,   
high  
resolution axial images were   
obtained through the head, neck   
and superior  
mediastinum following bolus   
administration of intravenous   
contrast for CT  
angiography. 3D maximum   
intensity projection images were   
created,  
reviewed and archived . MQ:   
CTABNPlus_4; Spiral, high   
resolution axial  
unenhanced images were obtained   
from the skull base to the  
cervicothoracic junction with   
sagittal and coronal planar  
reconstructions. MQ: [id number]  
  
Contrast: 85 mL Omnipaque 350 IV  
CT Radiation dose: Integrated   
Dose-Length Product (DLP) for   
this visit =  
415 (accession [id number]), 1430   
(accession [id number]), 1430   
(accession  
[id number]), 1430 (accession   
[id number]) mGy*cm.  
CT Dose Reduction Employed:   
Automated exposure control(AEC)   
and iterative  
recon  
  
COMPARISON: 2020  
  
RESULT:  
  
BRAIN:  
  
Acute change: No evidence of an   
acute contusion or other acute  
parenchymal process. ASPECT   
Score = 10  
  
Hemorrhage: No evidence of acute   
intracranial hemorrhage. ECASS  
hemorrhagic transformation   
score: Not Applicable  
  
Mass Lesion / Mass Effect: There   
is no evidence of an   
intracranial  
mass or extra-axial fluid   
collection. No significant mass   
effect.  
  
Chronic change: None apparent.  
  
Parenchyma: There is no   
significant volume loss. The   
brain parenchyma  
is otherwise within normal   
limits for age.  
  
Ventricles: The ventricles are   
within normal limits of size and  
configuration for age.  
  
Other: No evidence of prior   
calvarial fracture. Minimal   
mucosal  
thickening is noted in the   
visualized anterior ethmoid air   
cells. Mild  
mucosal thickening and an   
air-fluid level are noted in   
each maxillary  
antrum suggesting acute   
sinusitis in the appropriate   
clinical setting.  
No clear evidence of underlying   
acute facial bone fracture.  
  
NECK:  
  
Soft tissues: The soft tissue   
planes are maintained   
throughout. No  
evidence of a soft tissue mass   
in the neck or superior   
mediastinum. No  
significant lymphadenopathy is   
seen.  
  
Lung apices: The visualized lung   
apices are clear.  
  
CERVICAL SPINE:  
  
Counting reference:   
Craniocervical junction.   
Anatomic Variants: None.  
  
 (topogram) images: No   
additional findings.  
  
Alignment: Alignment is   
anatomic.  
  
Craniocervical junction:   
Craniocervical junction is   
normal.  
  
Osseous structures/fracture: No   
evidence of a lytic or blastic   
process  
in the visualized spine. No   
evidence of acute or chronic   
fracture.  
  
Cervical soft tissues: The   
paraspinal soft tissues are   
within normal  
limits.  
  
Canal and foramina: Cervical   
canal and foramina widely   
patent.  
  
CT ARTERIOGRAM:  
  
Extracranial Circulation:  
  
Aortic Arch: There is a normal   
branching pattern from the   
aortic arch..  
There is no significant stenosis   
in the proximal brachiocephalic   
vessels.  
  
Carotid Stenosis:  
Right Common: No significant   
stenosis.  
Right Internal Carotid Plaque:   
The cervical right ICA is within   
normal  
limits of caliber and   
configuration. No clear evidence   
of prior  
dissection.  
Right Internal Carotid Stenosis   
(% by NASCET Criteria): 0  
  
Left Common: No significant   
stenosis.  
Left Internal Carotid Plaque:   
The cervical left ICA is within   
normal  
limits of caliber and   
configuration. No clear evidence   
of prior  
dissection.  
Left Internal Carotid Stenosis   
(% by NASCET Criteria): 0  
  
Cervical Vertebral Arteries:  
Patency: Bilateral. Both   
cervical vertebral arteries also   
appear to be  
within normal limits of caliber   
and configuration.  
Dominance: Right  
  
Intracranial Circulation:  
  
Spot Sign Presence: Not   
Applicable  
Spot Sign Number: Not Applicable  
  
Anterior Circulation: The distal   
ICAs are patent and within   
normal  
limits of caliber and   
configuration. Proximal ACAs and   
MCAs are patent.  
A1 segments are codominant.   
Proximal ACAs and MCAs are   
within normal  
limits of caliber and   
configuration. No evidence of   
focal significant  
stenosis, intraluminal filling   
defect, anomalous luminal   
tapering,  
aneurysm, or vascular   
malformation in the anterior   
intracranial  
circulation.  
  
Vertebrobasilar Circulation: The   
distal vertebral arteries are   
patent.  
Right vertebral artery is   
dominant. The distal vertebral   
and basilar  
arteries are within normal   
limits of caliber and   
configuration. The  
proximal PICAs, AICAs, SCA's and   
PCAs are patent and within   
normal limits  
of caliber and configuration.   
The left PCOM is patent but   
diminutive in  
caliber. No distinct right PCOM   
is seen. No evidence of focal  
significant stenosis, abrupt   
vessel occlusion, aneurysm or   
vascular  
malformation in the posterior   
intracranial circulation.  
  
There is uniform enhancement of   
the superior sagittal, straight,   
and both  
transverse and both sigmoid   
sinuses.  
  
 (topogram) images: No   
additional findings.  
  
Impression: IMPRESSION:  
  
Normal head CT. No evidence of   
an intracranial bleed of recent   
closed  
head injury.  
  
Normal cervical spine. No   
evidence of acute cervical spine   
fracture.  
  
Normal extracranial vasculature.   
No evidence of cervical internal  
carotid or vertebral artery   
dissection.  
  
No evidence of significant large   
vessel intracranial occlusive   
disease or  
intracranial aneurysm.  
  
Arterial blood flow was measured   
to detect acute large vessel   
occlusion  
by computer aided detection   
software: Not Performed.  
Concordance between software and   
imaging review: Not Applicable.  
  
: ABDOUL  
Transcribe Date/Time: Oct 27   
2023 1:52P  
  
Dictated by : KAELB JIMENEZ MD  
  
This examination was interpreted   
and the report reviewed and  
electronically signed by:  
KALEB JIMENEZ MD on Oct 27 2023   
2:08PM EST  
  
  
  
  
  
  
CT Brain Report  
  
CT BRAIN WO IVCON Exam End:   
2020 12:22 AM (Final   
result)  
Narrative: * * *Final Report* *   
*  
  
DATE OF EXAM: 2020   
12:22AM  
  
[facility] [place] /   
ACCESSION # [id number]  
  
PROCEDURE REASON: Head trauma,   
headache  
  
* * * * Physician Interpretation   
* * * *  
  
EXAMINATION: CT BRAIN WO IVCON  
  
CLINICAL HISTORY: Head trauma.   
Flipped a 4 rico without   
helmet.  
Headache.  
  
TECHNIQUE: Serial axial images   
without IV contrast were   
obtained from  
the vertex to the foramen   
magnum.  
MQ: [id number]_3  
  
CT Dose-Length Product (DLP):   
445 mGy*cm  
CT Dose Reduction Employed: No   
dose reduction techniques were   
required  
  
COMPARISON: None.  
  
  
RESULT:  
  
Post-operative change: None.  
  
Acute change: No evidence of an   
acute infarct or other acute  
parenchymal process.  
  
Hemorrhage: No evidence of acute   
intracranial hemorrhage.  
  
Mass Lesion / Mass Effect: There   
is no evidence of an   
intracranial mass  
or extraaxial fluid collection.   
No significant mass effect.  
  
Chronic change: None apparent.  
  
Parenchyma: There is no   
significant volume loss. The   
brain parenchyma  
is otherwise within normal   
limits for age.  
  
Ventricles: The ventricles are   
within normal limits of size and  
configuration for age.  
  
Paranasal sinuses and skull   
base: The visualized paranasal   
sinuses are  
grossly clear. The skull base   
and imaged soft tissues are   
unremarkable.  
  
 (topogram) images: No   
additional findings.  
  
  
Impression: IMPRESSION:  
  
No evidence for acute   
intracranial injury.  
  
  
  
  
: ABDOUL  
Transcribe Date/Time: 2020 12:34A  
  
Dictated by : HUMBLE ELI MD  
  
This examination was interpreted   
and the report reviewed and  
electronically signed by:  
UHMBLE ELI MD on 2020 1:05AM EST  
  
  
  
  
  
  
  
  
  
MRI Report  
No resulted procedures found.  
  
  
  
  
Hemoglobin (g/dL)  
Date Value  
2022 11.7  
10/09/2021 11.5  
  
  
Hematocrit (%)  
Date Value  
2022 36.4  
10/09/2021 35.5  
  
  
WBC (k/uL)  
Date Value  
2022 5.83  
10/09/2021 16.09  
  
  
Platelet Count (k/uL)  
Date Value  
2022 255  
10/09/2021 276  
  
  
BMP Latest Ref Rng & Units   
2022 10/9/2021 2021  
GLUCOSE 74 - 99 mg/dL 74 88 78  
BUN 7 - 21 mg/dL 7 8 10  
CREATININE 0.58 - 0.96 mg/dL   
0.66 0.63 0.74  
SODIUM 136 - 144 mmol/L 140 138   
140  
POTASSIUM 3.7 - 5.1 mmol/L 3.8   
3.8 4.0  
CHLORIDE 97 - 105 mmol/L 106(H)   
103 104  
CO2 22 - 30 mmol/L 26 22 23  
ANION GAP 9 - 18 mmol/L 8(L) 13   
13  
CALCIUM, TOTAL 8.5 - 10.2 mg/dL   
9.1 9.2 9.7  
eGFR >=60 mL/min/1.73m 130 >60 -  
EGFR- - - >60 -  
EGFR-ALL OTHER RACES . - >60 -  
EGFR-PEDIATRIC FACTOR - -  
  
Assessment/Plan:  
S09.90XD Injury of head,   
subsequent encounter (primary   
encounter diagnosis)  
Z87.820 History of concussion  
M54.2 Cervical pain  
R42 Dizziness  
H53.10 Subjective visual   
disturbance  
Comment: Pt with pmh concussion   
(x8), anxiety previously seen   
for concussion. She reports she   
was riding a horse that bucked   
and threw her forward. Following   
the event she suffered a severe   
headache, cervical pain,   
peripheral vision loss,   
intermittent blurred vision,   
dizziness/vertigo, and L facial   
numbness. For further evaluation   
CT brain/neck and CTA head/neck   
were completed and unremarkable.   
She was started on gabapentin   
100 mg twice daily for treatment   
of symptoms as well as Naproxen   
500mg BID prn. At time of   
appointment today she reports   
that all symptoms have resolved.   
She has since stopped taking   
gabapentin without recurrence of   
symptoms. She reports no further   
head injuries. As she has no   
further symptoms, no additional   
work-up or treatment is   
necessary at this time.   
Recommend continuing to avoid   
activities that may lead to   
higher susceptibility of head   
injury. She may follow-up as   
needed  
  
Amalia Marquez APRN.CNP  
  
I spent a total of 18 minutes on   
the date of the service which   
included preparing to see the   
patient, face-to-face patient   
care, completing clinical   
documentation, obtaining and/or   
reviewing separately obtained   
history, performing a medically   
appropriate examination,   
counseling and educating the   
patient/family/caregiver, and   
ordering medications, tests, or   
procedures.  
  
  
Electronically signed by   
Amalia Marquez APRN.CNP at   
2023 10:55 AM EST  
documented in this encounter            [facility]  
   
                                        2023 Note     HNO ID: [id number]  
Author: Amalia Marquez APRN.CNP  
Service: ?  
Author Type: Nurse Practitioner  
Type: Progress Notes  
Filed: 2023 10:55 AM  
Note Text:  
[facility] Neurologic   
[place]  
Follow-up Visit  
Follow-up note  
2023  
HPI: Ms. Rowell presents today   
for a follow-up visit.  
Per her previous visit on   
10/13/23:  
S09.90XD Injury of head,   
subsequent encounter (primary   
encounter  
diagnosis)  
Z87.820 History of concussion  
M54.2 Cervical pain  
R42 Dizziness  
H53.10 Subjective visual   
disturbance  
Comment: Pt with pmh concussion   
(x8), anxiety presenting today   
for  
follow-up after concussion. She   
reports that roughly 3 weeks ago   
she was  
riding a horse that bucked and   
threw her forward. She did not   
strike her  
head but does report that   
following this event she   
suffered a severe  
headache. Headache has persisted   
since that time. She also   
reports  
intermittent cervical pain and   
has been following with her   
chiropractor  
who she states diagnosed her   
with whiplash. Notably, she   
reports that  
more recently she had an episode   
of loss of peripheral vision and  
intermittent blurred vision as   
well as persistent vertigo. She   
also  
experienced an episode of L   
facial numbness which has since   
resolved. We  
will proceed with plan as   
follows  
-CT brain/neck to evaluate for   
posttraumatic changes given   
recent cervical  
strain as well as past history   
of multiple head injuries.  
-CTA head/neck given   
neurological deficits (loss of   
sensation, visual  
deficit, vertigo and recent   
chiropractic work on C spine) to   
evaluate for  
vessel changes such as   
dissection.  
-Begin gabapentin 100 mg BID for   
treatment of headaches, vertigo,   
cervical  
pain. Will increase to 200mg   
after one week and again to   
300mg after one  
week if no SE.  
-Begin naproxen 500 mg BID as   
needed at onset of headache for   
headache  
. She should not take   
more than 3 days/week or in   
combination  
with other NSAIDs.  
-May continue Flexeril as needed   
as prescribed by her   
chiropractor for  
cervical pain.  
-Consult to physical therapy for   
improvement of cervical pain,   
vertigo,  
and relief of headaches.  
Discussed concussion as well as   
expected SE and methods for  
assisting/expediting the   
recovery process. Have   
encouraged her to avoid  
increased physical/mental   
exertion (horseback riding)   
until postconcussion  
symptoms have improved.  
Recommend continuing to avoid   
activities that may lead to   
higher  
susceptibility of head injury as   
well as activities that may   
exacerbate  
her symptoms (bright lights,   
loud sounds, irregular sleep   
schedule,  
etc.).She will follow-up in 4- 6   
weeks at which time we will   
reassess  
symptoms and determine if   
further intervention is needed.   
If experiencing  
red flag symptoms (including   
worst headache of life, focal   
weakness,  
return of sensory changes,   
vision or speech changes) she   
should present to  
the ED.  
Has not had a headache in three   
weeks. Maybe had one when at   
home and not  
felt it was due to head injury.   
No further vision changes. No   
dizziness.  
No facial numbness.  
Feels gabapentin has been   
helpful in regards to dizziness.   
Was taking one  
per day. No longer taking   
medication as of two weeks ago.   
Hasn't needed to  
use naproxen. Has not needed   
muscle relaxant.  
Did see chiro last week; will be   
going back in six months.  
Was able to work normal hours.  
Has been riding without   
difficulty. Wearing a helmet.   
Has been able to  
focus. Work is going ok.  
No further head injuries.  
PAST MEDICAL HISTORY  
Diagnosis Date  
Panic attacks  
PAST SURGICAL HISTORY  
Procedure Laterality Date  
LAPAROSCOPIC CHOLECYSTECTOMY   
2021  
Current Outpatient Medications   
on File Prior to Visit  
Medication Sig  
amitriptyline (ELAVIL) 10 mg   
tablet Take 1 tablet by mouth   
daily at  
bedtime.  
gabapentin (NEURONTIN) 100 mg   
capsule Take 3 capsules by mouth   
two times  
a day for 90 days.  
naproxen (NAPROSYN) 500 mg   
tablet Take 1 tablet by mouth   
two times a day  
as needed (for pain. Take with   
food.).  
ondansetron (ZOFRAN) 4 mg tablet   
Take 4 mg by mouth every 8 hours   
as  
needed. Taking as needed.  
Norethin-Eth Estrad Triphasic   
(ORTHO-NOVUM , 28,)   
0.5/0.75/1 mg- 35  
mcg per tablet Take 1 tablet by   
mouth once daily.  
No current facility-administered   
medications on file prior to   
visit.  
Social History  
Tobacco Use  
Smoking status: Every Day  
Types: Cigarettes  
Passive exposure: Yes  
Smokeless tobacco: Never  
Tobacco comments:  
Vaping  
Vaping Use  
Vaping Use: current everyday   
user  
Substances: Nicotine  
Substance Use Topics  
Alcohol use: No  
Drug use: No  
ALLERGIES  
Allergen Reactions  
Amoxicillin Rash  
Welchol [Colesevela* Hives,   
Itching  
Review of Systems:  
ENT: denies loss of hearing,   
vertigo  
Vision: denies blurring vison,   
double vision/diplopia  
Cardiopulmonary: denies chest   
pain, palpitations  
Respiratory: denies shortness of   
breath  
GI: denies recent nausea,   
vomiting, diarrhea, constipation  
: denies incontinence  
Psych: de (more content not   
included)...                            [facility]  
   
                                                    10- History of   
Present illness Narrative               Formatting of this note is   
different from the original.  
Radiology Service Progress Note  
  
DATE OF SERVICE: 2023  
TIME: 1:42 PM  
  
PATIENT IDENTITY VERIFICATION   
COMPLETED USING TWO (2) STANDARD   
IDENTIFIERS: Name and Date of   
Birth confirmed by patient   
verbally.  
FALL SCREENING: Has the patient   
had 2 falls in the last year or   
1 fall with injury or currently   
using an Ambulatory Assistive   
Device (Walker, Cane,   
Wheelchair, Crutches, etc.)? No  
PATIENT GENDER DATA: Female.   
Pregnancy status: Pregnant: No   
Breastfeeding status: NO.  
PATIENT RELEVANT IMPLANT DATA   
REVIEWED: Yes  
ALLERGIES: Reviewed and   
unchanged  
CONTRAST ALLERGY: NO.  
  
EXAM: CT -CONTRAST INDUCED   
NEPHROPATHY RISK FACTORS: Not   
applicable  
  
CREATININE:  
Creatinine  
Date Value Ref Range Status  
2022 0.66 0.58 - 0.96   
mg/dL Final  
10/09/2021 0.63 0.58 - 0.96   
mg/dL Final  
2021 0.74 0.58 - 0.96   
mg/dL Final  
Comment:  
Reference ranges for this   
patient's age group have not   
been established. These  
reference ranges reflect   
verified or established ranges   
for the adult  
population. Interpret these   
ranges with caution using the   
clinical context and  
additional reference resources.  
  
  
Estimated Glomerular Filtration   
Rate  
Date Value Ref Range Status  
2022 130 >=60 mL/min/1.73m   
Final  
Comment:  
Estimated Glomerular Filtration   
Rate (eGFR) is calculated using   
the  CKD-EPI creatinine   
equation. This equation utilizes   
serum creatinine, sex, and age   
as parameters. The creatinine   
assay has traceable calibration   
to isotope dilution-mass   
spectrometry. Refer to KDIGO   
guidelines for clinical   
interpretation. In patients with   
unstable renal function, e.g.   
those with acute kidney injury,   
the eGFR may not accurately   
reflect actual GFR.  
  
  
eGFR-  
Date Value Ref Range Status  
10/09/2021 >60 Final  
  
P.O.C.T. RESULTS: POC done: Yes,   
See Lab Tab 2023  
TREATMENT: N/A  
  
PERIPHERAL IV DATA: Ambulatory:   
A peripheral IV was started in   
the Left antecubital site with a   
Angio cath: 18 gauge.  
RADIOLOGY DEPARTMENT: CT;   
Exam(s) Completed: Brain , CTA   
Brain , CTA Neck , and Spine  
  
SIGNATURE: APOLONIA Saleh) PATIENT   
NAME: Deanna Rowell  
DATE: 2023 MRN:   
[id number]  
TIME: 1:42 PM  
  
Electronically signed by Diana Goddard RT (R) at   
10/27/2023 1:43 PM EDT  
documented in this encounter            [facility]  
   
                                        10- Note     HNO ID: [id number]  
Author: Diana Goddard RT (R)  
Service: ?  
Author Type: Technician  
Type: Progress Notes  
Filed: 10/27/2023 1:43 PM  
Note Text:  
Radiology Service Progress Note  
DATE OF SERVICE: 2023  
TIME: 1:42 PM  
PATIENT IDENTITY VERIFICATION   
COMPLETED USING TWO (2) STANDARD  
IDENTIFIERS: Name and Date of   
Birth confirmed by patient   
verbally.  
FALL SCREENING: Has the patient   
had 2 falls in the last year or   
1 fall  
with injury or currently using   
an Ambulatory Assistive Device   
(Walker,  
Cane, Wheelchair, Crutches,   
etc.)? No  
PATIENT GENDER DATA: Female.   
Pregnancy status: Pregnant: No  
Breastfeeding status: NO.  
PATIENT RELEVANT IMPLANT DATA   
REVIEWED: Yes  
ALLERGIES: Reviewed and   
unchanged  
CONTRAST ALLERGY: NO.  
EXAM: CT -CONTRAST INDUCED   
NEPHROPATHY RISK FACTORS: Not   
applicable  
CREATININE:  
Creatinine  
Date Value Ref Range Status  
2022 0.66 0.58 - 0.96   
mg/dL Final  
10/09/2021 0.63 0.58 - 0.96   
mg/dL Final  
2021 0.74 0.58 - 0.96   
mg/dL Final  
Comment:  
Reference ranges for this   
patient's age group have not   
been established.  
These  
reference ranges reflect   
verified or established ranges   
for the adult  
population. Interpret these   
ranges with caution using the   
clinical context  
and  
additional reference resources.  
Estimated Glomerular Filtration   
Rate  
Date Value Ref Range Status  
2022 130 >=60   
mL/min/1.73m? Final  
Comment:  
Estimated Glomerular Filtration   
Rate (eGFR) is calculated using   
the   
CKD-EPI creatinine equation.   
This equation utilizes serum   
creatinine, sex,  
and age as parameters. The   
creatinine assay has traceable   
calibration to  
isotope dilution-mass   
spectrometry. Refer to KDIGO   
guidelines for clinical  
interpretation. In patients with   
unstable renal function, e.g.   
those with  
acute kidney injury, the eGFR   
may not accurately reflect   
actual GFR.  
eGFR-  
Date Value Ref Range Status  
10/09/2021 >60 Final  
P.O.C.T. RESULTS: POC done: Yes,   
See Lab Tab 2023  
TREATMENT: N/A  
PERIPHERAL IV DATA: Ambulatory:   
A peripheral IV was started in   
the Left  
antecubital site with a Angio   
cath: 18 gauge.  
RADIOLOGY DEPARTMENT: CT;   
Exam(s) Completed: Brain , CTA   
Brain , CTA Neck  
, and Spine  
SIGNATURE: RT Therese(R) PATIENT   
NAME: Deanna Rowell  
DATE: 2023 MRN:   
[id number]  
TIME: 1:42 PM                           [facility]  
   
                                                    10- Miscellaneous   
Notes                                   Formatting of this note might be   
different from the original.  
Received [facility] approval  
Ref # - [id number]  
[id number]  
[id number]  
[id number]  
  
Mri/CAT  
10/13/23 - 23  
Head or brain without contrast  
  
MRI/CAT  
10/13/23 - 23 computed   
tomography cervical spine   
without contrast  
  
MRI/CAT  
10/13/23 - 23  
Tomographic angiography neck  
  
MRI/CAT  
10/13/23 - 23  
Tomographic angiography head  
Electronically signed by Regine Becerra at 10/26/2023 3:32 PM   
EDT  
documented in this encounter            [facility]  
   
                                                    10- History of   
Present illness Narrative               Formatting of this note is   
different from the original.  
Images from the original note   
were not included.  
  
  
[facility] Neurologic   
[place]  
New Patient Visit  
  
  
New Patient Consultation  
  
CHIEF COMPLAINT: Concussion  
  
Deanna Rowell is a 18 year   
old accompanied by her mother.   
Consult was requested by SHAN Talbot CNP for an opinion   
regarding concussion. My final   
impression and recommendations   
will be communicated back to the   
requesting physician by way of   
the shared medical record or   
fax.  
  
2023  
  
HPI: Ms. Rowell presents today   
secondary to issues of   
concussion. 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   
shows horses and was running   
through a chute. Horse bucked   
and threw her forward. An hour   
after that she had a severe   
headache. Three weeks ago. The   
first two weeks had a constant   
migraine. Lost peripheral vision   
and had fuzziness in eyes one   
night. Has been trying to take   
it easy otherwise will have   
sharp pains in her head. Did not   
fall off the horse. Did not go   
to ED.  
  
LOC: Denies  
Headache: Posterior with   
radiation forward into eyes. Or   
temple. No radiation into neck.   
Worse at night; works 7-7.   
Lights driving home make them   
worse. Has tried muscle relaxant   
from chiro as well as Aleve. Has   
seen her chiro twice since this   
occurred. No XR.  
Neck pain: Intermittent; if   
riding for a while will have   
worsening of pain in neck and   
low back.  
Dizziness: If putting her head   
down or closes her eyes can feel   
like room is spinning.  
N/V: Past week has felt   
nauseated especially at night  
Memory/cognitive concerns: Some   
difficulty with comprehension at   
work  
Speech changes: Denies  
Vision changes (double/blurred):   
Has had frequent blurred vision;   
looks like she took her contacts   
out intermittently; worse when   
headaches worse  
Light sensitivity: Yes  
Sound sensitivity: Denies  
Emotional changes: Denies  
Sleep concerns: Denies  
Paresthesias: Numbness and   
tingling to face a few days   
following; L  
  
History of head injury: Hx of 8   
concussions (four rico   
accident, horse accident)  
Neurological history: Hx of   
migraines in middle school;   
resolved on their own (saw   
neurology at )  
Family history: Denies  
  
Alcohol: Denies  
Tobacco: Vape  
  
PAST MEDICAL HISTORY  
Diagnosis Date  
Panic attacks  
  
PAST SURGICAL HISTORY  
Procedure Laterality Date  
LAPAROSCOPIC CHOLECYSTECTOMY   
2021  
  
Current Outpatient Medications   
on File Prior to Visit  
Medication Sig  
ondansetron (ZOFRAN) 4 mg tablet   
Take 4 mg by mouth every 8 hours   
as needed. Taking as needed.   
(Patient not taking: Reported on   
10/5/2023)  
amitriptyline (ELAVIL) 10 mg   
tablet Take 1 tablet by mouth   
daily at bedtime.  
busPIRone (BUSPAR) 7.5 mg tablet   
Take 1 tablet by mouth twice   
daily.  
Norethin-Eth Estrad Triphasic   
(ORTHO-NOVUM , 28,)   
0.5/0.75/1 mg- 35 mcg per tablet   
Take 1 tablet by mouth once   
daily.  
  
No current facility-administered   
medications on file prior to   
visit.  
  
Social History  
  
Tobacco Use  
Smoking status: Every Day  
Types: Cigarettes  
Passive exposure: Yes  
Smokeless tobacco: Never  
Tobacco comments:  
Vaping  
Vaping Use  
Vaping Use: current everyday   
user  
Substances: Nicotine  
Substance Use Topics  
Alcohol use: No  
Drug use: No  
  
ALLERGIES  
Allergen Reactions  
Amoxicillin Rash  
Welchol [Colesevela* Hives,   
Itching  
  
Review of Systems:  
Constitutional: denies fever,   
weight loss, loss of appetite  
ENT: denies loss of hearing, +   
vertigo  
Vision: denies + blurring vison,   
double vision/diplopia  
Cardiopulmonary: denies chest   
pain, palpitations  
Respiratory: denies shortness of   
breath  
GI: denies recent + nausea,   
vomiting, diarrhea, constipation  
: denies incontinence  
Psych: denies depression, +   
anxiety  
Sleep: denies issues with   
sleeping  
Heme: denies easy   
bruising/bleeding  
Musculoskeletal: denies   
weakness, joint ache/pain  
Back/spine: denies + low back or   
+ cervical pains  
Neuro: denies tremors, loss of   
feeling, + dizziness, seizure,   
blackout, paresthesia, + facial   
paresthesia, facial weakness, +   
difficulty in speech, slurring   
of words, dysarthria, dysphagia,   
memory loss, + headache  
  
Physical Exam:  
  
10/13/23  
0752  
BP: 123/79  
BP Site: Left Arm  
BP Position: Sitting  
BP Cuff Size: Regular Adult  
Pulse: 82  
SpO2: 100%  
Weight: 64.7 kg (142 lb 9.6 oz)  
Height: 165.1 cm (5' 5 )  
  
Patient is alert and in no   
distress. Dress is appropriate.   
Mood is appropriate  
Breathing appears regular and   
unstressed  
  
Neurologic examination:  
  
Cognitively intact. No deficits.   
No formal MOCA performed.  
CN: Pupils equal and reactive to   
light, extraocular movements   
intact with no nystagmus, mild   
ptosis R eyelid, facial   
sensation intact bilaterally to   
light touch. V1-3, hearing   
intact bilaterally, symmetric   
evaluation of the soft palate,   
tongue is midline with no   
deviation, shoulder shrug is   
symmetric.  
Motor exam shows 5/5 strength   
symmetric through the upper and   
lower extremities in all groups   
tested.  
Sensory intact to light touch   
and temperature in all   
extremities. Vibratory sensation   
is intact and symmetric all   
extremities.  
Deep tendon reflexes are   
decreased symmetrically at the   
biceps, brachioradialis,   
triceps, patella, and achilles   
bilaterally. Negative Farris's   
bilaterally.  
Coordination: No dysmetria on   
finger to nose. No tremors   
noted. No drift seen.  
Gait normal in stance and   
pattern.  
Tandem without imbalance.  
Negative Romberg.  
  
Labs/studies:  
  
CT Brain Report  
  
CT BRAIN WO IVCON Exam End:   
2020 12:22 AM (Final   
result)  
Narrative: * * *Final Report* *   
*  
  
DATE OF EXAM: 2020   
12:22AM  
  
[facility] [place] /   
ACCESSION # [id number]  
  
PROCEDURE REASON: Head trauma,   
headache  
  
* * * * Physician Interpretation   
* * * *  
  
EXAMINATION: CT BRAIN WO IVCON  
  
CLINICAL HISTORY: Head trauma.   
Flipped a 4 rico without   
helmet.  
Headache.  
  
TECHNIQUE: Serial axial images   
without IV contrast were   
obtained from  
the vertex to the foramen   
magnum.  
MQ: [id number]  
  
CT Dose-Length Product (DLP):   
445 mGy*cm  
CT Dose Reduction Employed: No   
dose reduction techniques were   
required  
  
COMPARISON: None.  
  
RESULT:  
  
Post-operative change: None.  
  
Acute change: No evidence of an   
acute infarct or other acute  
parenchymal process.  
  
Hemorrhage: No evidence of acute   
intracranial hemorrhage.  
  
Mass Lesion / Mass Effect: There   
is no evidence of an   
intracranial mass  
or extraaxial fluid collection.   
No significant mass effect.  
  
Chronic change: None apparent.  
  
Parenchyma: There is no   
significant volume loss. The   
brain parenchyma  
is otherwise within normal   
limits for age.  
  
Ventricles: The ventricles are   
within normal limits of size and  
configuration for age.  
  
Paranasal sinuses and skull   
base: The visualized paranasal   
sinuses are  
grossly clear. The skull base   
and imaged soft tissues are   
unremarkable.  
  
 (topogram) images: No   
additional findings.  
  
Impression: IMPRESSION:  
  
No evidence for acute   
intracranial injury.  
  
: ABDOUL  
Transcribe Date/Time: 2020 12:34A  
  
Dictated by : HUMBLE ELI MD  
  
This examination was interpreted   
and the report reviewed and  
electronically signed by:  
HUMBLE ELI MD on 2020 1:05AM EST  
  
  
  
  
  
  
MRI Report  
No resulted procedures found.  
  
  
Hemoglobin (g/dL)  
Date Value  
2022 11.7  
10/09/2021 11.5  
  
Hematocrit (%)  
Date Value  
2022 36.4  
10/09/2021 35.5  
  
WBC (k/uL)  
Date Value  
2022 5.83  
10/09/2021 16.09  
  
Platelet Count (k/uL)  
Date Value  
2022 255  
10/09/2021 276  
  
BMP Latest Ref Rng & Units   
2022 10/9/2021 2021  
GLUCOSE 74 - 99 mg/dL 74 88 78  
BUN 7 - 21 mg/dL 7 8 10  
CREATININE 0.58 - 0.96 mg/dL   
0.66 0.63 0.74  
SODIUM 136 - 144 mmol/L 140 138   
140  
POTASSIUM 3.7 - 5.1 mmol/L 3.8   
3.8 4.0  
CHLORIDE 97 - 105 mmol/L 106(H)   
103 104  
CO2 22 - 30 mmol/L 26 22 23  
ANION GAP 9 - 18 mmol/L 8(L) 13   
13  
CALCIUM, TOTAL 8.5 - 10.2 mg/dL   
9.1 9.2 9.7  
eGFR >=60 mL/min/1.73m 130 >60 -  
EGFR- - - >60 -  
EGFR-ALL OTHER RACES . - >60 -  
EGFR-PEDIATRIC FACTOR - - - 0.56  
  
Assessment/Plan:  
S09.90XD Injury of head,   
subsequent encounter (primary   
encounter diagnosis)  
Z87.820 History of concussion  
M54.2 Cervical pain  
R42 Dizziness  
H53.10 Subjective visual   
disturbance  
Comment: Pt with pmh concussion   
(x8), anxiety presenting today   
for follow-up after concussion.   
She reports that roughly 3 weeks   
ago she was riding a horse that   
bucked and threw her forward.   
She did not strike her head but   
does report that following this   
event she suffered a severe   
headache. Headache has persisted   
since that time. She also   
reports intermittent cervical   
pain and has been following with   
her chiropractor who she states   
diagnosed her with whiplash.   
Notably, she reports that more   
recently she had an episode of   
loss of peripheral vision and   
intermittent blurred vision as   
well as persistent vertigo. She   
also experienced an episode of L   
facial numbness which has since   
resolved. We will proceed with   
plan as follows  
  
-CT brain/neck to evaluate for   
posttraumatic changes given   
recent cervical strain as well   
as past history of multiple head   
injuries.  
-CTA head/neck given   
neurological deficits (loss of   
sensation, visual deficit,   
vertigo and recent chiropractic   
work on C spine) to evaluate for   
vessel changes such as   
dissection.  
-Begin gabapentin 100 mg BID for   
treatment of headaches, vertigo,   
cervical pain. Will increase to   
200mg after one week and again   
to 300mg after one week if no   
SE.  
-Begin naproxen 500 mg BID as   
needed at onset of headache for   
headache . She should   
not take more than 3 days/week   
or in combination with other   
NSAIDs.  
-May continue Flexeril as needed   
as prescribed by her   
chiropractor for cervical pain.  
-Consult to physical therapy for   
improvement of cervical pain,   
vertigo, and relief of   
headaches.  
  
Discussed concussion as well as   
expected SE and methods for   
assisting/expediting the   
recovery process. Have   
encouraged her to avoid   
increased physical/mental   
exertion (horseback riding)   
until postconcussion symptoms   
have improved.  
  
Recommend continuing to avoid   
activities that may lead to   
higher susceptibility of head   
injury as well as activities   
that may exacerbate her symptoms   
(bright lights, loud sounds,   
irregular sleep schedule,   
etc.).She will follow-up in 4- 6   
weeks at which time we will   
reassess symptoms and determine   
if further intervention is   
needed. If experiencing red flag   
symptoms (including worst   
headache of life, focal   
weakness, return of sensory   
changes, vision or speech   
changes) she should present to   
the ED.  
  
Office Visit on 10/13/23  
CT CERVICAL SPINE WO IVCON  
CT BRAIN WO IVCON  
CTA HEAD W IVCON  
CTA NECK W IVCON  
CONSULT TO PEDS NEUROLOGY  
CONSULT TO PHYSICAL THERAPY  
  
NAYAN Clemente.CNP  
  
I spent a total of 50 minutes on   
the date of the service which   
included preparing to see the   
patient, face-to-face patient   
care, completing clinical   
documentation, obtaining and/or   
reviewing separately obtained   
history, performing a medically   
appropriate examination,   
counseling and educating the   
patient/family/caregiver, and   
ordering medications, tests, or   
procedures.  
  
**Portions of this note were   
created with electronic   
dictation and errors in   
spelling, syntax, and meaning   
may have occurred.**  
Electronically signed by   
Amalia Marquez APRN.CNP at   
10/13/2023 8:56 AM EDT  
documented in this encounter            [facility]  
   
                                        10- Note     HNO ID: [id number]  
Author: Amalia Marquez APRN.CNP  
Service: ?  
Author Type: Nurse Practitioner  
Type: Progress Notes  
Filed: 10/13/2023 8:56 AM  
Note Text:  
[facility] Neurologic   
[place]  
New Patient Visit  
New Patient Consultation  
CHIEF COMPLAINT: Concussion  
Deanna Rowell is a 18 year   
old accompanied by her mother.   
Consult was  
requested by SHAN Talbot CNP for an   
opinion regarding concussion. My   
final  
impression and recommendations   
will be communicated back to the   
requesting  
physician by way of the shared   
medical record or fax.  
2023  
HPI: Ms. Rowell presents today   
secondary to issues of   
concussion. She  
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   
was running through a chute.   
Horse bucked  
and threw her forward. An hour   
after that she had a severe   
headache. Three  
weeks ago. The first two weeks   
had a constant migraine. Lost   
peripheral  
vision and had fuzziness in eyes   
one night. Has been trying to   
take it  
easy otherwise will have sharp   
pains in her head. Did not fall   
off the  
horse. Did not go to ED.  
LOC: Denies  
Headache: Posterior with   
radiation forward into eyes. Or   
temple. No  
radiation into neck. Worse at   
night; works 7-7. Lights driving   
home make  
them worse. Has tried muscle   
relaxant from chiro as well as   
Aleve. Has  
seen her chiro twice since this   
occurred. No XR.  
Neck pain: Intermittent; if   
riding for a while will have   
worsening of pain  
in neck and low back.  
Dizziness: If putting her head   
down or closes her eyes can feel   
like room  
is spinning.  
N/V: Past week has felt   
nauseated especially at night  
Memory/cognitive concerns: Some   
difficulty with comprehension at   
work  
Speech changes: Denies  
Vision changes (double/blurred):   
Has had frequent blurred vision;   
looks  
like she took her contacts out   
intermittently; worse when   
headaches worse  
Light sensitivity: Yes  
Sound sensitivity: Denies  
Emotional changes: Denies  
Sleep concerns: Denies  
Paresthesias: Numbness and   
tingling to face a few days   
following; L  
History of head injury: Hx of 8   
concussions (four rico   
accident, horse  
accident)  
Neurological history: Hx of   
migraines in middle school;   
resolved on their  
own (saw neurology at )  
Family history: Denies  
Alcohol: Denies  
Tobacco: Vape  
PAST MEDICAL HISTORY  
Diagnosis Date  
Panic attacks  
PAST SURGICAL HISTORY  
Procedure Laterality Date  
LAPAROSCOPIC CHOLECYSTECTOMY   
2021  
Current Outpatient Medications   
on File Prior to Visit  
Medication Sig  
ondansetron (ZOFRAN) 4 mg tablet   
Take 4 mg by mouth every 8 hours   
as  
needed. Taking as needed.   
(Patient not taking: Reported on   
10/5/2023)  
amitriptyline (ELAVIL) 10 mg   
tablet Take 1 tablet by mouth   
daily at  
bedtime.  
busPIRone (BUSPAR) 7.5 mg tablet   
Take 1 tablet by mouth twice   
daily.  
Norethin-Eth Estrad Triphasic   
(ORTHO-NOVUM , 28,)   
0.5/0.75/1 mg- 35  
mcg per tablet Take 1 tablet by   
mouth once daily.  
No current facility-administered   
medications on file prior to   
visit.  
Social History  
Tobacco Use  
Smoking status: Every Day  
Types: Cigarettes  
Passive exposure: Yes  
Smokeless tobacco: Never  
Tobacco comments:  
Vaping  
Vaping Use  
Vaping Use: current everyday   
user  
Substances: Nicotine  
Substance Use Topics  
Alcohol use: No  
Drug use: No  
ALLERGIES  
Allergen Reactions  
Amoxicillin Rash  
Welchol [Colesevela* Hives,   
Itching  
Review of Systems:  
Constitutional: denies fever,   
weight loss, loss of appetite  
ENT: denies loss of hearing, +   
vertigo  
Vision: denies + blurring vison,   
double vision/diplopia  
Cardiopulmonary: denies chest   
pain, palpitations  
Respiratory: denies shortness of   
breath  
GI: denies recent + nausea,   
vomiting, diarrhea, constipation  
: denies incontinence  
Psych: denies depression, +   
anxiety  
Sleep: denies issues with   
sleeping  
Heme: denies easy   
bruising/bleeding  
Musculoskeletal: denies   
weakness, joint ache/pain  
Back/spine: denies + low back or   
+ cervical pains  
Neuro: denies tremors, loss of   
feeling, + dizziness, seizure,   
blackout,  
paresthesia, + facial   
paresthesia, facial weakness, +   
difficulty in  
speech, slurring of words,   
dysarthria, dysphagia, memory   
loss, + headache  
Physical Exam:  
10/13/23  
0752  
BP: 123/79  
BP Site: Left Arm  
BP Position: Sitting  
BP Cuff Size: Regular Adult  
Pulse: 82  
SpO2: 100%  
Weight: 64.7 kg (142 lb 9.6 oz)  
Height: 165.1 cm (5' 5 )  
Patient is alert and in no   
distress. Dress is appropriate.   
Mood is  
appropriate  
Breathing appears regular and   
unstressed  
Neurologic examination:  
Cognitively intact. No deficits.   
No formal MOCA performed.  
CN: Pupils equal and reactive to   
light, extraocular movements   
intact with  
no nystagmus, mild ptosis R   
eyelid, facial sensation intact   
bilaterally to  
light touch. V1-3, hearing   
intact bilaterally, symmetric   
evaluation of the  
soft palate, tongue is midline   
with no deviation, shoulder   
shrug is  
symmetric.  
Motor exam shows 5/5 strength   
symmetric through the upper and   
lower  
extremities in all groups   
tested.  
(more content not included)...          [facility]  
   
                                        10- Note     HNO ID: [id number]  
Author: Neto Talbot APRN.CNP  
Service: ?  
Author Type: Nurse Practitioner  
Type: Progress Notes  
Filed: 10/11/2023 3:53 PM  
Note Text:  
Left without being seen. Patient   
had to leave to go to work.  
  
Neto Talbot APRN.CNP                  [facility]  
   
                                                    10- History of   
Present illness Narrative               Formatting of this note might be   
different from the original.  
Left without being seen. Patient   
had to leave to go to work.  
  
Neto Talbot APRN.CNP  
  
Electronically signed by Neto Talbot APRN.CNP at 10/11/2023   
3:53 PM EDT  
documented in this encounter            [facility]  
   
                                        10- Instructions   
  
  
Neto Talbot APRN.CNP -   
10/11/2023 3:05 PM EDTFormatting   
of this note is different from   
the original.  
Images from the original note   
were not included.  
  
  
5 to Go!TM  
Healthy Kids Inside & Out  
  
5 Eat FIVE fruits and veggies a   
day  
4 Give and get FOUR compliments   
a day  
3 Consume THREE calcium products   
a day  
2 Limit media time to TWO hours   
a day  
1 Get at least ONE hour of   
exercise a day  
0 Consume ZERO sugar-sweetened   
drinks  
Go! Be healthy, inside and out!  
  
www.[facility].org/5toGo  
  
  
  
Adolescent to Adult Transition   
Program  
  
[facility] cares about   
helping you and each of our   
adolescents and young adults   
make a smooth transition to   
adult care.  
  
If your current doctor is a   
pediatrician, we will work with   
you to decide the correct age   
for moving your care to a doctor   
or other provider who takes care   
of adults.  
We suggest that this move take   
place before age 22.  
Our office policy is to prepare   
you to move to a doctor or other   
provider who takes care of   
adults.  
This includes helping you find a   
doctor or other provider,   
sending medical records, and   
talking about any special needs   
with the new doctor or other   
provider.  
  
If your current doctor is in   
family medicine, [facility] will prepare you and your   
family for the transition to   
being an adult patient.  
You will be able to make your   
own healthcare decisions and   
will have an adult care team   
that meets your personal   
healthcare needs.  
  
At age 18, by law, we need your   
agreement to discuss personal   
health information with your   
family.  
We understand and respect that   
you may want to include your   
family in healthcare choices and   
will partner with you on how and   
when to include your family in   
decisions.  
We will make sure you know what   
changes to expect.  
We will also strive to make sure   
that all care team providers   
know your needs.  
We will help you find community   
resources and specialty care, if   
needed.  
Having your information before   
you come for the first time   
helps us be sure we do not miss   
any details. If joining our   
practice from outside [facility], we will help you request   
your medical record from past   
doctor(s) before your first   
visit. We will make every effort   
to work with your past providers   
to ensure a smooth transition   
and experience.  
  
We are always here for you. If   
you have any questions or   
concerns, please contact your   
primary care team or e-mail   
[email or web address]  
  
[facility] Transition  is the federally   
funded national resource center   
on health care transition (HCT).   
Its aim is to improve transition   
from pediatric to adult health   
care through the use of   
evidence-driven strategies for   
health care professionals,   
youth, young adults, and their   
families.  
www.gottransition.org  
https://gottransition.org/resour  
ce/?hct-family-toolkit  
  
Electronically signed by Neto Talbot APRN.CNP at 10/11/2023   
3:05 PM EDT  
  
documented in this encounter            [facility]  
   
                                        10- Note     HNO ID: [id number]  
Author: Neto Talbot APRN.CNP  
Service: ?  
Author Type: Nurse Practitioner  
Type: Progress Notes  
Filed: 10/6/2023 7:41 AM  
Note Text:  
PEDIATRIC SICK VISIT  
SUBJECTIVE:  
Deanna Rowell is a 18 year   
old accompanied  
Patient presents with:  
Headache: Onset x 2 weeks ago,   
horse bucked and she jerked   
forward. Saw  
Chiropractor Tried ice and OTC   
Meds (Tylenol/Motrin). Had   
blurred  
vision/lost peripheral vision.   
Had pins and needle feeling in   
left side of  
face  
History was obtained from:   
patient  
Has history of headaches, used   
to see neurology.  
Also has history of concussions-   
2020, 3/2022  
On  was in an equestrian   
show and horse bucked, she did   
not fall off  
but head did jerk back and   
forward and side to side.  
Since then she has had a   
headache  
Last Friday lost peripheral   
vision while driving at night  
Did have emesis that night-none   
since that time.  
Seeing chiropractor-thinks   
whiplash  
No other nausea or vomiting  
Did rest last Saturday-but did   
not resolve headache.  
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  
Does not work today or tomorrow  
Works 8-2 Sat and Sun  
Vision ok today  
Tried motrin and ice  
HISTORY:  
ACTIVE PROBLEM LIST  
Episodic Tension-Type Headache,   
Not Intractable  
Chronic Left-Sided Low Back Pain   
Without Sciatica  
Biliary Dyskinesia  
Covid-19 Vaccination Declined  
PAST MEDICAL HISTORY  
Diagnosis Date  
Panic attacks  
PAST SURGICAL HISTORY  
Procedure Laterality Date  
LAPAROSCOPIC CHOLECYSTECTOMY   
2021  
Allergies:  
ALLERGIES  
Allergen Reactions  
Amoxicillin Rash  
Welchol [Colesevela* Hives,   
Itching  
Medications:  
amitriptyline (ELAVIL) 10 mg   
tablet Take 1 tablet by mouth   
daily at  
bedtime.  
busPIRone (BUSPAR) 7.5 mg tablet   
Take 1 tablet by mouth twice   
daily.  
ondansetron (ZOFRAN) 4 mg tablet   
Take 4 mg by mouth every 8 hours   
as  
needed. Taking as needed.   
(Patient not taking: Reported on   
10/5/2023)  
Norethin-Eth Estrad Triphasic   
(ORTHO-NOVUM , 28,)   
0.5/0.75/1 mg- 35  
mcg per tablet Take 1 tablet by   
mouth once daily.  
OBJECTIVE:  
/72   Pulse 89   Temp 37.2   
?C (98.9 ?F) (Temporal)   Resp   
19    
Wt 65.3 kg (143 lb 14.4 oz)     
LMP 2023 (Approximate)     
SpO2 98%    
BMI 23.95 kg/m?  
General: alert and active in no   
apparent distress  
Eyes: conjunctiva clear, PERRL,   
EOMI  
Ears: TMs translucent   
bilaterally, normal landmarks   
noted  
Nose: no rhinorrhea, no mucosal   
edema  
OP: no lesions, no erythema  
Neck: supple, no adenopathy  
Lungs: clear to auscultation   
bilaterally, good air exchange,   
no  
retractions  
CVS: Normal rate, regular   
rhythm, no murmur  
Abdomen: soft, nondistended,   
nontender, and no   
hepatosplenomegaly or  
masses  
Skin: No rashes, lesions or skin   
changes  
Neuro: No focal deficits or   
abnormal findings present  
ASSESSMENT/PLAN:  
Encounter Diagnosis  
ICD-10-CM  
1. History of concussion Z87.820   
CONSULT TO PEDS NEUROLOGY  
-rest-no activity or devices   
today or tomorrow  
-discussed concerning symptoms   
with patient and when to go to   
ER  
-follow up in one week  
-see neurology  
NAYAN Gilmore.[facility]  
   
                                                    10- History of   
Present illness Narrative               Formatting of this note is   
different from the original.  
PEDIATRIC SICK VISIT  
  
SUBJECTIVE:  
Deanna Rowell is a 18 year   
old accompanied  
Patient presents with:  
Headache: Onset x 2 weeks ago,   
horse bucked and she jerked   
forward. Saw Chiropractor Tried   
ice and OTC Meds   
(Tylenol/Motrin). Had blurred   
vision/lost peripheral vision.   
Had pins and needle feeling in   
left side of face  
  
History was obtained from:   
patient  
  
Has history of headaches, used   
to see neurology.  
Also has history of concussions-   
2020, 3/2022  
  
On  was in an equestrian   
show and horse bucked, she did   
not fall off but head did jerk   
back and forward and side to   
side.  
Since then she has had a   
headache  
  
  
Last Friday lost peripheral   
vision while driving at night  
Did have emesis that night-none   
since that time.  
Seeing chiropractor-thinks   
whiplash  
No other nausea or vomiting  
Did rest last Saturday-but did   
not resolve headache.  
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  
Does not work today or tomorrow  
Works 8-2 Sat and Sun  
Vision ok today  
Tried motrin and ice  
  
HISTORY:  
ACTIVE PROBLEM LIST  
Episodic Tension-Type Headache,   
Not Intractable  
Chronic Left-Sided Low Back Pain   
Without Sciatica  
Biliary Dyskinesia  
Covid-19 Vaccination Declined  
  
PAST MEDICAL HISTORY  
Diagnosis Date  
Panic attacks  
  
PAST SURGICAL HISTORY  
Procedure Laterality Date  
LAPAROSCOPIC CHOLECYSTECTOMY   
2021  
  
Allergies:  
ALLERGIES  
Allergen Reactions  
Amoxicillin Rash  
Welchol [Colesevela* Hives,   
Itching  
  
Medications:  
amitriptyline (ELAVIL) 10 mg   
tablet Take 1 tablet by mouth   
daily at bedtime.  
busPIRone (BUSPAR) 7.5 mg tablet   
Take 1 tablet by mouth twice   
daily.  
ondansetron (ZOFRAN) 4 mg tablet   
Take 4 mg by mouth every 8 hours   
as needed. Taking as needed.   
(Patient not taking: Reported on   
10/5/2023)  
Norethin-Eth Estrad Triphasic   
(ORTHO-NOVUM , 28,)   
0.5/0.75/1 mg- 35 mcg per tablet   
Take 1 tablet by mouth once   
daily.  
  
OBJECTIVE:  
/72   Pulse 89   Temp 37.2   
C (98.9 F) (Temporal)   Resp 19   
  Wt 65.3 kg (143 lb 14.4 oz)     
LMP 2023 (Approximate)     
SpO2 98%   BMI 23.95 kg/m  
General: alert and active in no   
apparent distress  
Eyes: conjunctiva clear, PERRL,   
EOMI  
Ears: TMs translucent   
bilaterally, normal landmarks   
noted  
Nose: no rhinorrhea, no mucosal   
edema  
OP: no lesions, no erythema  
Neck: supple, no adenopathy  
Lungs: clear to auscultation   
bilaterally, good air exchange,   
no retractions  
CVS: Normal rate, regular   
rhythm, no murmur  
Abdomen: soft, nondistended,   
nontender, and no   
hepatosplenomegaly or masses  
Skin: No rashes, lesions or skin   
changes  
  
Neuro: No focal deficits or   
abnormal findings present  
  
ASSESSMENT/PLAN:  
Encounter Diagnosis  
ICD-10-CM  
1. History of concussion Z87.820   
CONSULT TO PEDS NEUROLOGY  
  
  
-rest-no activity or devices   
today or tomorrow  
-discussed concerning symptoms   
with patient and when to go to   
ER  
-follow up in one week  
-see neurology  
  
Neto Talbot APRN.CNP  
  
Electronically signed by Neto Talbot APRN.CNP at 10/06/2023   
7:41 AM EDT  
documented in this encounter            [facility]  
   
                                        10- Instructions   
  
  
Neto Talbot APRN.CNP -   
10/05/2023 11:41 AM   
EDTFormatting of this note is   
different from the original.  
  
5 to Go!TM  
Healthy Kids Inside & Out  
  
5 Eat FIVE fruits and veggies a   
day  
4 Give and get FOUR compliments   
a day  
3 Consume THREE calcium products   
a day  
2 Limit media time to TWO hours   
a day  
1 Get at least ONE hour of   
exercise a day  
0 Consume ZERO sugar-sweetened   
drinks  
Go! Be healthy, inside and out!  
  
[email or web address]  
  
Electronically signed by Neto Talbot APRN.CNP at 10/05/2023   
11:41 AM EDT  
  
documented in this encounter            [facility]  
   
                                                    2023 History of   
Present illness Narrative               Deanna is a pleasant   
17-year-old female presenting   
today for 2-week follow-up of   
right pinky crush injury.wearing   
wrap (short aluminum foam splint   
with Coban wrap) all the time,   
went swimming without wrap   
yesterday and swelling after.No   
improvement in sensationUnable   
to take NSAIDs stomach ulcers.          [facility] [address]  
Work Phone:   
[phone]  
   
                                                    2023 Miscellaneous   
Notes                                   Formatting of this note might be   
different from the original.  
Spoke with patient. Given   
message from provider's office.   
Patient verbalizes   
understanding.  
Dyan Mustafa RN  
Electronically signed by   
Dyan Mustafa, RN at   
2023 6:51 PM EDT  
Formatting of this note might be   
different from the original.  
Left message for pt to call   
back.  
Jenna Pimentel MA  
  
Electronically signed by Jenna Pimentel MA at 2023 6:44   
PM EDT  
Formatting of this note might be   
different from the original.  
Left message for pt to call   
back.  
Jenna Pimentel MA  
  
Electronically signed by Jenna Pimentel MA at 2023 7:57   
AM EDT  
Formatting of this note might be   
different from the original.  
Phone call placed brief message   
to contact a nurse to review   
results.  
  
Piper Grijalva LPN  
  
Electronically signed by Piper Grijalva LPN at 2023 1:31   
PM EDT  
Formatting of this note might be   
different from the original.  
Please call and let patient know   
her urine culture did not show   
significant signs of an   
infection. If antibiotics are   
helping she can finish otherwise   
discontinue. Would recommend   
follow-up with primary care   
provider.  
Electronically signed by Sydnie Mccormick PA-C at 2023 12:30   
PM EDT  
documented in this encounter            [facility]  
   
                                                    2023 History of   
Present illness Narrative               Formatting of this note is   
different from the original.  
This note was created using   
[employer].  
Subjective  
Deanna Rowell is a 17 year   
old female.  
  
HPI  
Presents with urinary frequency,   
dysuria and urgency over the   
past 2 days. She was on   
antibiotics starting  for   
UTI. She took 7 days of Keflex.   
She states she felt better while   
she was on it but within a day   
or 2 of being off of it her   
symptoms came back again. She   
has been on Keflex 4 times since   
April due to recurrent strep   
throat as well. Her urine   
culture did show it was   
susceptible to Keflex. She   
denies fever. No back pain. She   
is sexually active. Denies   
vaginal itching or discharge.   
Last menstrual cycle was 3 weeks   
ago. Does not think she is   
pregnant.  
Review of Systems  
Constitutional: Negative.  
HENT: Negative.  
Respiratory: Negative.  
Cardiovascular: Negative.  
Gastrointestinal: Negative.  
Genitourinary: Positive for   
dysuria, frequency and urgency.   
Negative for hematuria, vaginal   
bleeding, vaginal discharge and   
vaginal pain.  
Musculoskeletal: Negative.  
All other systems reviewed and   
are negative.  
  
PAST MEDICAL HISTORY  
Diagnosis Date  
Panic attacks  
  
Current Outpatient Medications  
Medication Sig Dispense Refill  
ondansetron (ZOFRAN) 4 mg tablet   
Take 4 mg by mouth every 8 hours   
as needed. Taking as needed.  
amitriptyline (ELAVIL) 10 mg   
tablet Take 1 tablet by mouth   
daily at bedtime. 30 tablet 5  
busPIRone (BUSPAR) 7.5 mg tablet   
Take 1 tablet by mouth twice   
daily. 180 tablet 1  
Norethin-Eth Estrad Triphasic   
(ORTHO-NOVUM 7/7/7, 28,)   
0.5/0.75/1 mg- 35 mcg per tablet   
Take 1 tablet by mouth once   
daily. 84 tablet 3  
sulfamethoxazole-trimethoprim   
(BACTRIM DS) 800-160 mg per   
tablet Take 1 tablet by mouth   
twice daily for 7 days. 14   
tablet 0  
  
No current facility-administered   
medications for this visit.  
  
PAST SURGICAL HISTORY  
Procedure Laterality Date  
LAPAROSCOPIC CHOLECYSTECTOMY   
2021  
  
FAMILY HISTORY  
Problem Relation Age of Onset  
No Known Problems Mother  
No Known Problems Father  
No Known Problems Brother  
No Known Problems Maternal   
Grandmother  
No Known Problems Maternal   
Grandfather  
No Known Problems Paternal   
Grandmother  
other (heart attack) Paternal   
Grandfather  
Hypertension Other  
maternal side  
  
Social History  
  
Tobacco Use  
Smoking status: Every Day  
Types: Cigarettes  
Passive exposure: Yes  
Smokeless tobacco: Never  
Tobacco comments:  
Vaping  
Vaping Use  
Vaping Use: current everyday   
user  
Substances: Nicotine  
Substance Use Topics  
Alcohol use: No  
Drug use: No  
  
Objective  
/70   Pulse 85   Temp 36.8   
C (98.2 F)   Resp 20   Wt 66 kg   
(145 lb 9.6 oz)   LMP 2023   
(Approximate)   SpO2 99%   BMI   
24.23 kg/m  
Physical Exam  
Vitals reviewed.  
Constitutional:  
Appearance: Normal appearance.  
HENT:  
Head: Normocephalic and   
atraumatic.  
Cardiovascular:  
Rate and Rhythm: Normal rate and   
regular rhythm.  
Heart sounds: Normal heart   
sounds.  
Pulmonary:  
Effort: Pulmonary effort is   
normal.  
Breath sounds: Normal breath   
sounds.  
Abdominal:  
General: Abdomen is flat.  
Palpations: Abdomen is soft.  
Tenderness: There is no   
abdominal tenderness. There is   
no right CVA tenderness, left   
CVA tenderness or guarding.  
Musculoskeletal:  
Cervical back: Neck supple.  
Skin:  
General: Skin is warm and dry.  
Findings: No rash.  
Neurological:  
Mental Status: She is alert.  
  
Assessment and Plan  
ASSESSMENT/PLAN:  
1. Recurrent UTI - ICD9: 599.0,   
ICD10: N39.0  
recurrent  
- UA positive for kathi esterase,   
hematuria, and proteinuria. HCG   
negative.  
- Send urine for culture  
- Begin treatment with Bactrim   
DS BID for 7 days  
- I did advise oral birth   
control will be less effective   
on antibiotics and to use   
another method to avoid   
pregnancy this cycle.  
- UA DIP, URINE (POC)  
- URINE CULTURE  
- HCG QUAL UR B/O  
  
Sydnie Mccormick PA-C  
  
  
Electronically signed by Sydnie Mccormick PA-C at 2023 8:31   
AM EDT  
documented in this encounter            [facility]  
   
                                                    07- History of   
Present illness Narrative               Agree with CC as noted. Deanna   
is a pleasant 17-year-old female   
presenting today for evaluation   
of crushing injury of right   
pinky finger. Patient states she   
was at work on 7/15/2023 when   
her finger was caught in a door   
jam near a hinge and the door   
shut. Patient states she had   
pain, swelling and   
discoloration. Sought ED   
evaluation. Patient has been   
wearing an aluminum finger   
splint that extends into the   
palm of the hand. Patient is not   
taking any OTC medications for   
symptom control, ice makes it   
worse. Patient states she has   
some numb tingling through the   
finger. Patient works full-time   
as a [employer]. Patient is unable to   
take NSAIDs due to history of   
ulcer in the past. Patient is   
right-hand dominant, no prior   
injuries to this hand or finger.        [facility] [address]  
Work Phone:   
[phone]  
   
                                                    2023 History of   
Present illness Narrative               Formatting of this note is   
different from the original.  
Subjective  
HPI  
Nontoxic-appearing female   
presents urgent care chief   
complaint possible UTI. Duration   
of symptoms 1 day. Associated   
symptoms dysuria, frequency, and   
urgency. Patient has history of   
UTIs in past with similar signs   
and symptoms. Patient denies the   
use of any over-the-counter   
medications or home remedies for   
symptom management. Patient   
states pain is a 5/10. Patient   
denies any fevers, flank pain,   
abdominal pain, nausea,   
vomiting, vaginal discharge,   
chance of STDs, chance of   
pregnancy, or urological   
abnormalities. Past medical   
history prescription medication   
use allergies reviewed.  
  
.Patient presents with:  
Urinary Problem: Pt reported   
burning, frequency x1 day.  
  
PAST MEDICAL HISTORY  
Diagnosis Date  
Panic attacks  
  
  
PAST SURGICAL HISTORY  
Procedure Laterality Date  
LAPAROSCOPIC CHOLECYSTECTOMY   
2021  
  
ALLERGIES Amoxicillin and   
Welchol [Colesevelam]  
  
MEDICATIONS  
ondansetron (ZOFRAN) 4 mg tablet   
Take 4 mg by mouth every 8 hours   
as needed. Taking as needed.  
amitriptyline (ELAVIL) 10 mg   
tablet Take 1 tablet by mouth   
daily at bedtime.  
busPIRone (BUSPAR) 7.5 mg tablet   
Take 1 tablet by mouth twice   
daily.  
Norethin-Eth Estrad Triphasic   
(ORTHO-NOVUM , 28,)   
0.5/0.75/1 mg- 35 mcg per tablet   
Take 1 tablet by mouth once   
daily.  
  
FAMILY HISTORY  
Problem Relation Age of Onset  
No Known Problems Mother  
No Known Problems Father  
No Known Problems Brother  
No Known Problems Maternal   
Grandmother  
No Known Problems Maternal   
Grandfather  
No Known Problems Paternal   
Grandmother  
other (heart attack) Paternal   
Grandfather  
Hypertension Other  
maternal side  
  
Social History  
  
Tobacco Use  
Smoking status: Every Day  
Types: Cigarettes  
Passive exposure: Yes  
Smokeless tobacco: Never  
Tobacco comments:  
Vaping  
Vaping Use  
Vaping Use: current everyday   
user  
Substances: Nicotine  
Substance Use Topics  
Alcohol use: No  
Drug use: No  
  
/76   Pulse 80   Temp 36.8   
C (98.2 F) (Temporal)   Resp 16   
  Wt 66 kg (145 lb 9.6 oz)   LMP   
2023 (Approximate)   BMI   
24.23 kg/m  
  
Review of Systems  
Constitutional: Negative for   
chills, fever and   
malaise/fatigue.  
HENT: Negative for congestion,   
ear discharge, ear pain, sinus   
pain and sore throat.  
Eyes: Negative for blurred   
vision, pain, discharge and   
redness.  
Respiratory: Negative for cough,   
hemoptysis, sputum production,   
shortness of breath, wheezing   
and stridor.  
Cardiovascular: Negative for   
chest pain.  
Gastrointestinal: Negative for   
abdominal pain, diarrhea, nausea   
and vomiting.  
Genitourinary: Positive for   
dysuria, frequency and urgency.   
Negative for flank pain and   
hematuria.  
Musculoskeletal: Negative for   
myalgias.  
Skin: Negative for itching and   
rash.  
Neurological: Negative for   
dizziness and headaches.  
  
  
  
Objective  
Physical Exam  
Constitutional:  
General: She is not in acute   
distress.  
Appearance: She is not   
toxic-appearing.  
HENT:  
Head: Normocephalic.  
Nose: Nose normal.  
Mouth/Throat:  
Mouth: Mucous membranes are   
moist.  
Eyes:  
Pupils: Pupils are equal, round,   
and reactive to light.  
Cardiovascular:  
Rate and Rhythm: Normal rate.  
Pulmonary:  
Effort: Pulmonary effort is   
normal. No respiratory distress.  
Abdominal:  
Tenderness: There is no   
abdominal tenderness. There is   
no right CVA tenderness, left   
CVA tenderness or guarding.  
Musculoskeletal:  
Cervical back: Normal range of   
motion.  
Skin:  
General: Skin is warm and dry.  
Neurological:  
General: No focal deficit   
present.  
Mental Status: She is alert.  
  
ASSESSMENT/PLAN:  
1. Burning with urination -   
ICD9: 788.1, ICD10: R30.0  
  
- UA DIP, URINE (POC)  
- URINE CULTURE  
  
Small amount of leukocytes.   
Trace amount of blood noted.   
Diagnosed with acute cystitis.   
Placed on Keflex.  
Patient was educated on   
supportive therapies. Patient   
will follow up with primary care   
provider as needed. Patient was   
instructed to immediately   
proceed to emergency room for   
any new, worsening, or symptoms   
lasting longer than anticipated.   
The patient's clinical   
presentation is otherwise   
unremarkable at this time. Based   
on exam and clinical finding,   
the patient is stable for   
discharge. Plan of care was   
discussed with patient. Patient   
verbalizes understanding and   
agrees to plan of care. This   
note was generated using Dragon   
software. It may contain errors   
in wording, punctuation, or   
spelling.  
  
NAYAN Chowdary.BRIAN  
  
  
Electronically signed by   
Zackary Trivedi APRN.CNP at   
2023 10:52 AM EDT  
documented in this encounter            [facility]  
   
                                        2023 Instructions   
  
  
Grisel Cardenas PA-C -   
2023 1:29 PM EDTFormatting   
of this note might be different   
from the original.  
[address]  
  
[phone]  
  
You may do the Leodan 6 exercises:  
Rows, Biceps Curls, Triceps,   
Shrugs, Free weights up overhead   
& Close  pull downs.  
  
Avoid these exercises:  
Flys, seated flies, upright   
rows, dips, push ups or pull   
ups.  
  
 If the elbow is away, you will   
pay.   
Electronically signed by Grisel Cardenas PA-C at 2023 1:29   
PM EDT  
  
documented in this encounter            [facility]  
   
                                                    2023 History of   
Present illness Narrative               Associated Order(s): Large Joint   
Arthro/Inj: L glenohumeral  
Post-Procedure Diagnose(s):   
Biceps tendonitis on left  
Formatting of this note is   
different from the original.  
Images from the original note   
were not included.  
  
SERVICE DATE: 2023  
PCP: Ashley Sierra DO  
Patient was self-referred.  
  
Subjective  
Patient ID: Deanna is a 17   
year old RHD female. She has   
been struggling with left   
shoulder pain for 2 days after   
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   
a patient. She went to the   
chiropractor and then went to   
Urgent Care where x-rays were   
taken and she was placed in a   
sling. She has been taking   
Tylenol for pain. States the   
pain will radiated down the back   
of her shoulder and down her   
left arm. Denies any distal   
paresthesias. She does see a   
chiropractor twice a week for   
her neck and low back. She has   
never had any previous shoulder   
issues. She has never done any   
formal PT or had any cortisone   
injections. States the pain is   
constant. She has more limited   
motion d/t the pain. She is   
unable to take NSAIDs d/t a h/o   
GI ulcers.  
  
Chief Complaint:  
Patient presents with:  
Left Shoulder - New, Pain  
  
PAIN EVALUATION  
  
2023  
1304  
  
  
  
Pain Level: 6  
Pain Location: Shoulder-Left  
Description:   
Aching;Radiating;Sharp  
Sharp pain down the left arm.  
Duration Amount of Time: 2  
Frequency: Continuous  
Intervention/Comfort measure:   
Medication;Cold  
Tylenol as needed. Patient   
arrived in a sling.  
  
  
  
HPI  
  
TREATMENTS PRIOR TO INITIAL   
CONSULT:  
Oral NSAIDS - Patient Unable to   
Tolerate  
Left Bracing: sling  
  
Review of Systems  
ACTIVE PROBLEM LIST  
Episodic Tension-Type Headache,   
Not Intractable  
Chronic Left-Sided Low Back Pain   
Without Sciatica  
Biliary Dyskinesia  
Covid-19 Vaccination Declined  
  
PAST MEDICAL HISTORY  
Diagnosis Date  
Panic attacks  
  
PAST SURGICAL HISTORY  
Procedure Laterality Date  
LAPAROSCOPIC CHOLECYSTECTOMY   
2021  
  
FAMILY HISTORY  
Problem Relation Age of Onset  
No Known Problems Mother  
No Known Problems Father  
No Known Problems Brother  
No Known Problems Maternal   
Grandmother  
No Known Problems Maternal   
Grandfather  
No Known Problems Paternal   
Grandmother  
other (heart attack) Paternal   
Grandfather  
Hypertension Other  
maternal side  
  
Social History  
  
Tobacco Use  
Smoking status: Every Day  
Types: Cigarettes  
Passive exposure: Yes  
Smokeless tobacco: Never  
Tobacco comments:  
Vaping  
Vaping Use  
Vaping Use: current everyday   
user  
Substances: Nicotine  
Substance Use Topics  
Alcohol use: No  
Drug use: No  
  
ALLERGIES  
Allergen Reactions  
Amoxicillin Rash  
Welchol [Colesevela* Hives,   
Itching  
  
MEDICATIONS:  
ondansetron (ZOFRAN) 4 mg tablet   
Take 4 mg by mouth every 8 hours   
as needed. Taking as needed.  
amitriptyline (ELAVIL) 10 mg   
tablet Take 1 tablet by mouth   
daily at bedtime.  
busPIRone (BUSPAR) 7.5 mg tablet   
Take 1 tablet by mouth twice   
daily.  
Norethin-Eth Estrad Triphasic   
(ORTHO-NOVUM , 28,)   
0.5/0.75/1 mg- 35 mcg per tablet   
Take 1 tablet by mouth once   
daily.  
  
Allergies, medications, past   
surgical history, family history   
and past medical history were   
reviewed per this encounter.  
  
  
  
Objective  
Ortho Exam  
Examination of the left shoulder   
reveals the skin to be clean,   
dry and intact. There is no   
surrounding erythema or   
ecchymoses. She does have   
significant tenderness palpation   
of the left bicipital groove. No   
particular tenderness over the   
left AC joint. Active forward   
flexion in the left shoulder is   
to 170 degrees. External   
rotation with the arm in neutral   
position 60 degrees. Internal   
rotation to L1. Rotator cuff   
exam is notable for 5/5   
strength. Negative belly press   
test. Positive speeds test.   
Positive Cleveland's test.   
Positive impingement signs.   
Positive Neer sign.   
Neurovascular intact distally   
with 2+ radial pulses   
bilaterally.  
  
Last XR Shoulder - Impression   
Only  
  
XR SHOULDER GENERAL 3V OR MORE   
AP/TRUE AP/OTHER LEFT Exam End:   
2023 12:31 PM (Final   
result)  
Impression: IMPRESSION: No acute   
radiographic abnormalities seen   
in the left  
shoulder.  
  
: ABDOUL  
Transcribe Date/Time: 2023 12:38P  
  
Dictated by : KARL BARRAGAN MD...  
  
  
  
  
  
  
  
Assessment/Plan  
ASSESSMENT  
Diagnosis  
(M75.22) Biceps tendonitis on   
left (primary encounter   
diagnosis)  
Plan: Large Joint Arthro/Inj: L   
glenohumeral  
  
No orders found for this visit   
on 23.  
PLAN  
Patient was given a cortisone   
injection into the left shoulder   
glenohumeral joint and tolerated   
the procedure well. I did show   
the patient exercises to do at   
home as well as exercises to   
avoid. I did answer all of her   
questions. I will see her back   
as needed. I did fill out letter   
for work allowing her to return   
to work but recommend that she   
avoid lifting, pushing or   
pulling over 1-2 lbs with the   
left arm for 1 week.  
  
  
Large Joint Arthro/Inj: L   
glenohumeral  
  
Informed Consent  
Consent Obtained: Verbal  
  
Universal Protocol  
A moment to CARE was completed.  
  
SIGN IN  
Personnel directly involved with   
the procedure wore the   
appropriate PPE.  
Special Equipment: N/A  
Patient/Surrogate   
Stated/Verified: Patient name,   
Date of birth, Relevant   
allergies and Intended procedure  
  
TIME OUT  
Intended patient and procedure   
match the source document(s).  
Consent documented and matches   
the intended procedure.  
Relevant labs, photos, and/or   
imaging studies have been   
reviewed.  
Correct side/site marked and   
visible.  
Medications required for   
procedure verified.  
No fire risk assessment and   
interventions applicable.  
No implant(s) inserted.  
  
2023 1:29 PM  
The procedure site was prepped   
in the usual sterile fashion.  
  
Site: L glenohumeral  
  
Medications: 40 mg triamcinolone   
acetonide 40 mg/mL  
Anesthetics: 4 mL lidocaine (PF)   
10 mg/mL (1 %)  
  
Outcome: Tolerated well, no   
immediate complications  
Post-injection instructions were   
reviewed with the patient and   
the patient voiced understanding   
of these instructions.  
SIGN OUT  
No instruments, equipment or   
retained foreign bodies   
applicable.  
  
  
FOLLOW-UP: No follow-ups on   
file.  
  
I reviewed the information   
obtained and documented by the   
physician assistant. I examined   
the patient and evaluated all   
available films and pertinent   
documents. We discussed the case   
and I agree with the plans as   
outlined in this note.  
  
SIGNATURE: Grisel L Skebe, PA-C   
PATIENT NAME: Deanna Rowell  
DATE: 2023 MRN:   
[id number]  
TIME: 1:14 PM  
  
  
Electronically signed by Grisel Cardenas PA-C at 2023 2:16   
PM EDT  
documented in this encounter            [facility]  
   
                                                    2023 History of   
Present illness Narrative               Formatting of this note is   
different from the original.  
Images from the original note   
were not included.  
Subjective  
She came in with complaints of   
left shoulder pain. Patient says   
she believes she may have pulled   
or lifted something too heavy.   
Patient says it hurts worse with   
certain range of motion. Patient   
denies any numbness tingling or   
loss of feeling.  
  
The history is provided by the   
patient. No    
was used.  
Pain (Shoulder Pain)  
  
Review of Systems  
Constitutional: Negative.  
Skin: Negative.  
  
  
Objective  
Physical Exam  
Constitutional:  
Appearance: Normal appearance.  
Pulmonary:  
Effort: Pulmonary effort is   
normal.  
Musculoskeletal:  
Arms:  
  
Comments: Denies pain in the   
area marked above. When   
performing range of motion or   
palpated. No signs of   
deformities noted. Patient   
failed the scratch test and   
empty can test.  
Neurological:  
Mental Status: She is alert.  
  
  
PAST MEDICAL HISTORY  
Diagnosis Date  
Panic attacks  
  
  
PAST SURGICAL HISTORY  
Procedure Laterality Date  
LAPAROSCOPIC CHOLECYSTECTOMY   
2021  
  
ALLERGIES Amoxicillin and   
Welchol [Colesevelam]  
  
MEDICATIONS  
amitriptyline (ELAVIL) 10 mg   
tablet Take 1 tablet by mouth   
daily at bedtime.  
busPIRone (BUSPAR) 7.5 mg tablet   
Take 1 tablet by mouth twice   
daily.  
Norethin-Eth Estrad Triphasic   
(ORTHO-NOVUM , 28,)   
0.5/0.75/1 mg- 35 mcg per tablet   
Take 1 tablet by mouth once   
daily.  
  
FAMILY HISTORY  
Problem Relation Age of Onset  
No Known Problems Mother  
No Known Problems Father  
No Known Problems Brother  
No Known Problems Maternal   
Grandmother  
No Known Problems Maternal   
Grandfather  
No Known Problems Paternal   
Grandmother  
other (heart attack) Paternal   
Grandfather  
Hypertension Other  
maternal side  
  
Social History  
  
Tobacco Use  
Smoking status: Every Day  
Types: Cigarettes  
Passive exposure: Yes  
Smokeless tobacco: Never  
Tobacco comments:  
Vaping  
Vaping Use  
Vaping Use: current everyday   
user  
Substances: Nicotine  
Substance Use Topics  
Alcohol use: No  
Drug use: No  
  
ASSESSMENT/PLAN:  
1. Pain - ICD9: 780.96, ICD10:   
R52  
  
- XR SHOULDER GENERAL 3V OR MORE   
AP/TRUE AP/OTHER LEFT  
  
* * * * Physician Interpretation   
* * * *  
  
EXAM TITLE: XR SHLDR >/=3V   
AP/BRET AP/OTHR LT  
  
EXAM DATE/TIME: 2023 12:31   
PM  
  
COMPARISON: None.  
  
CLINICAL INDICATION/HISTORY:   
Shoulder pain  
  
TECHNIQUE: AP, true AP and Y   
views of the left shoulder are   
presented  
  
FINDINGS:  
No acute fractures or   
subluxations are noted.  
The acromioclavicular and   
glenohumeral joint spaces are   
maintained.  
Tiny cystic components seen in   
the acromion, nonspecific.  
Normal acromiohumeral interval.  
The mineralization of the bones   
is normal.  
There is no significant soft   
tissue swelling.  
  
IMPRESSION  
IMPRESSION: No acute   
radiographic abnormalities seen   
in the left  
shoulder.  
  
  
  
  
: ABDOUL  
Transcribe Date/Time: 2023 12:38P  
  
Dictated by : KARL BARRAGAN MD  
  
- CONSULT TO ORTHOPAEDICS  
  
patient To follow-up with   
orthopedics tomorrow. Patient   
was placed in a sling for   
comfort. Patient will follow-up   
for further testing.  
  
NAYAN Jerome.BRIAN  
  
Electronically signed by   
Michelle Velasquez APRN.CNP at   
2023 1:12 PM EDT  
documented in this encounter            [facility]  
   
                                                    2023 History of   
Present illness Narrative               Formatting of this note is   
different from the original.  
Patient presents with:  
Sore Throat: Strep throat   
symptoms x 2 months  
  
HPI:  
Has been positive for strep 3   
times since April. Shortly after   
stopping the antibiotic she   
developed strep throat again.   
Her throat has been sore again   
the last couple days. She cannot   
get into ENT until next week.  
Positive symptoms: slight Cough,   
Sore throat,  
Negative symptoms: Nasal   
Congestion, Rhinorrhea, Fever,   
Chills, Body Aches, Nausea,   
Vomiting, Diarrhea, earache  
OTC: none. Finished keflex 4   
days ago.  
  
MEDICATIONS:  
Current Outpatient Medications  
Medication Sig  
amitriptyline (ELAVIL) 10 mg   
tablet Take 1 tablet by mouth   
daily at bedtime.  
busPIRone (BUSPAR) 7.5 mg tablet   
Take 1 tablet by mouth twice   
daily.  
Norethin-Eth Estrad Triphasic   
(ORTHO-NOVUM /, 28,)   
0.5/0.75/1 mg- 35 mcg per tablet   
Take 1 tablet by mouth once   
daily.  
  
No current facility-administered   
medications for this visit.  
  
  
ALLERGIES:  
ALLERGIES  
Allergen Reactions  
Amoxicillin Rash  
Welchol [Colesevela* Hives,   
Itching  
  
VITALS:  
/82   Pulse 80   Temp 37.2   
C (99 F)   Resp 21   Wt 66.5 kg   
(146 lb 9.6 oz)   LMP 2023   
(Exact Date)   SpO2 100%   BMI   
24.40 kg/m  
  
PHYSICAL EXAM:  
GEN: Pleasant, in no acute   
distress.  
HEENT: PERRL, EOMI, conjunctiva   
clear  
Ears: canals clear. TMs without   
erythema, bulge, or effusion  
Sinuses: non-tender frontal   
sinus, non-tender maxillary   
sinuses  
Throat: moist mucous membranes,   
no erythema, no exudate  
Neck: supple, no thyromegaly, no   
lymphadenopathy  
HEART: regular rate and rhythm,   
no murmurs  
LUNGS: clear to auscultation, no   
wheezes or crackles, no   
increased WOB  
  
ASSESSMENT/PLAN:  
1. Sore throat - ICD9: 462,   
ICD10: J02.9  
- STREP A MOLECULAR (POC) -   
negative.  
Follow up if worsening or fails   
to improve.  
  
Lionel Garcia MD  
  
Electronically signed by Lionel Garcia MD at 2023 11:01   
AM EDT  
documented in this encounter            [facility]  
   
                                                    05- Miscellaneous   
Notes                                   Formatting of this note might be   
different from the original.  
Noted  
Neto Talbot APRN.CNP  
  
Electronically signed by Neto Talbot APRN.CNP at 05/15/2023   
4:09 PM EDT  
Formatting of this note might be   
different from the original.  
Reason for Disposition  
[1] Taking antibiotic > 48 hours   
(2 days) for strep throat AND   
[2] fever persists  
  
Answer Assessment - Initial   
Assessment Questions  
1. ANTIBIOTIC:  What antibiotic   
are you receiving?   
Mom reports that pt has tested   
positive for strep throat 3   
times since April 10. Initially   
tested + April 10 (prescribed   
amoxicillin), then again on    
(was prescribed Keflex), and   
most recently tested + this past   
week while in [place]. Pt   
was prescribed azithromycin, has   
been taking as directed since   
Saturday without improvement in   
symptoms.  
2. SEVERITY:  How bad is the   
sore throat?   
- MILD: doesn't interfere with   
eating  
- MODERATE: interferes with   
eating some solids  
- SEVERE: can't swallow liquids;   
drooling  
Mild/moderate. Pt is drinking   
fluids but appetite is decreased  
4. FEVER:  Do you have a fever?    
If Yes, ask:  What is your   
temperature, how was it   
measured, and when did it   
start?   
Mom reports that pt was febrile   
yesterday, is uncertain of what   
her temperature was. Mom is   
uncertain if pt is febrile   
today, says that pt has been   
taking Tylenol and is currently   
at work  
5. SYMPTOMS:  Are there any   
other symptoms you're concerned   
about?  If Yes, ask:  When did   
it start?   
 Really red, swollen, sore   
throat with white in the back of   
her throat.  Mom says that pt   
had developed oral thrush in the   
past while being treated for a   
strep infection, says pt   
suspects she is developing this   
again d/t white area in the back   
of her throat. Mom uncertain if   
she has any additional symptoms.  
6. PREGNANCY:  Is there any   
chance you are pregnant?   When   
was your last menstrual period?   
N/a pt is at work  
  
Protocols used: Strep Throat   
Infection on Antibiotic   
[facility]  
  
Mom requesting f/u appt for   
evaluation, appt made for   
tomorrow morning with Neto Talbot CNP. Advised continuing   
antibiotic as prescribed,   
encouraging fluids, and Tylenol   
or Motrin as needed for fever   
and to manage discomfort. Pt   
will f/u with any additional   
concerns or questions.  
Electronically signed by Deysi Sanchez RN at 05/15/2023 3:35   
PM EDT  
Formatting of this note might be   
different from the original.  
Mom left  on triage line   
stating patient has had strep   
throat 3 times since .  
Was in [place] last week   
and went to [facility] on   
Azithromycin and it is   
ineffective.  
She is asking for   
recommendations.  
  
Called to triage, no answer.  
Left  to call back.  
Will likely need OV for f/u.  
  
Shweta Lucas RN  
  
Reason for Disposition  
Message left on unidentified   
answering machine. Phone number   
verified per call center policy.  
  
Protocols used: No Contact or   
Duplicate Contact   
Call-PEDIATRIC-  
  
Electronically signed by Shweta Lucas RN at 05/15/2023   
9:53 AM EDT  
documented in this encounter            [facility]  
   
                                        2023 Instructions   
  
  
NAYAN Wu.CNP -   
2023 1:51 PM EDTFormatting   
of this note might be different   
from the original.  
Keflex as ordered  
Please inform patient that strep   
was + on culture. Contagious   
until on antibiotics for 24   
hours.  
Discard toothbrush/paste after   
72 hours of antibiotic use.   
Follow up with PCP as needed.  
* Seek medical care immediately,   
call 911, go to ER if you have   
chest pain, difficulty   
breathing, shortness of breath,   
inability to swallow.  
  
Mucinex DM as needed for mucous   
and cough  
  
  
Electronically signed by NAYAN Wu.CNP at 2023   
1:51 PM EDT  
  
documented in this encounter            [facility]  
   
                                                    2023 History of   
Present illness Narrative               Formatting of this note is   
different from the original.  
Subjective  
The history is provided by the   
patient and a parent. No   
 was used.  
HPI Deanna Rowell is a 17   
year old female who presents   
today for CC of sore throat for   
one day. She is also having a   
cough and chest congestion that   
started last night. She did a   
home covid test that was   
negative, she works in a nursing   
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   
and covid. She has used tylenol.   
Recent strep infection, finished   
antibiotic Thursday.  
  
/78   Pulse 91   Temp 37.1   
C (98.8 F)   Resp 16   Wt 67.1   
kg (148 lb)   LMP 2023   
(Exact Date)   SpO2 97%   BMI   
24.63 kg/m  
Social History  
  
Tobacco Use  
Smoking status: Every Day  
Types: Cigarettes  
Passive exposure: Yes  
Smokeless tobacco: Never  
Tobacco comments:  
Vaping  
Vaping Use  
Vaping Use: current everyday   
user  
Substances: Nicotine  
Substance Use Topics  
Alcohol use: No  
Drug use: No  
  
PAST MEDICAL HISTORY  
Diagnosis Date  
Panic attacks  
  
I have confirmed and edited as   
necessary, the [facility]  
  
Review of Systems  
Constitutional: Negative for   
chills, fever and   
malaise/fatigue.  
HENT: Positive for congestion   
(chest) and sore throat.   
Negative for ear pain and sinus   
pain.  
Respiratory: Positive for cough.   
Negative for sputum production,   
shortness of breath and   
wheezing.  
Cardiovascular: Negative for   
chest pain.  
Gastrointestinal: Positive for   
nausea and vomiting (x1).   
Negative for abdominal pain and   
diarrhea.  
Musculoskeletal: Negative for   
myalgias.  
Neurological: Negative for   
headaches.  
  
  
Objective  
Physical Exam  
Vitals and nursing note   
reviewed.  
HENT:  
Head: Normocephalic and   
atraumatic.  
Right Ear: Tympanic membrane,   
ear canal and external ear   
normal.  
Left Ear: Tympanic membrane, ear   
canal and external ear normal.  
Nose: No mucosal edema,   
congestion or rhinorrhea.  
Right Sinus: No maxillary sinus   
tenderness or frontal sinus   
tenderness.  
Left Sinus: No maxillary sinus   
tenderness or frontal sinus   
tenderness.  
Mouth/Throat:  
Pharynx: Uvula midline.   
Posterior oropharyngeal erythema   
present. No oropharyngeal   
exudate.  
Comments: Palatal petechiae  
Cardiovascular:  
Rate and Rhythm: Normal rate and   
regular rhythm.  
Heart sounds: Normal heart   
sounds.  
Pulmonary:  
Effort: Pulmonary effort is   
normal.  
Breath sounds: Normal breath   
sounds.  
Lymphadenopathy:  
Head:  
Right side of head: No   
submental, submandibular or   
tonsillar adenopathy.  
Left side of head: No submental,   
submandibular or tonsillar   
adenopathy.  
Cervical: No cervical   
adenopathy.  
Skin:  
General: Skin is warm and dry.  
Neurological:  
Mental Status: She is alert.  
Psychiatric:  
Mood and Affect: Affect normal.  
  
ASSESSMENT/PLAN:  
1. Strep pharyngitis - ICD9:   
034.0, ICD10: J02.0 (primary   
diagnosis)  
- suspect strep  
- Alere Strep Test positive, no   
culture pending  
- antibiotic as written  
- Discussed supportive care   
treatment with fluids, rest and   
analgesia.  
- The patient may also use warm   
salt water gargles, throat   
lozenges and/or OTC throat spray   
as needed.  
- Contagious dz precautions   
discussed- including considered   
contagious until on antibiotics   
for 24 hours  
- The patient should follow up   
in one week if symptoms persist   
or worsen  
- Call back if drooling,   
increased temperature, symptoms   
of dehydration and/or still sick   
in one week  
  
2. Sore throat - ICD9: 462,   
ICD10: J02.9  
Strep +  
- STREP A MOLECULAR (POC)  
  
Diagnosis and treatment plan   
were discussed and questions   
were answered to the patient's   
satisfaction. Pt acknowledged   
understanding of concepts and   
follow up plan.  
Specific signs and symptoms that   
would indicate the need for   
higher level of care were   
discussed in detail warranting   
prompt ER evaluation.  
NAYAN Wu.BRIAN  
  
  
Electronically signed by Ely Lu APRN.CNP at 2023   
1:56 PM EDT  
documented in this encounter            [facility]  
   
                                        2023 Instructions   
  
  
Bijan Reeves MD - 2023   
11:28 AM EDTFormatting of this   
note might be different from the   
original.  
- Dairy avoidance  
- 1 imodium per day  
- Gastroparesis test  
- Try amitryptyline  
- Pelvic US  
- Mesenteric US  
Electronically signed by Bijan Reeves MD at 2023 11:28   
AM EDT  
  
documented in this encounter            [facility]  
   
                                                    2023 History of   
Present illness Narrative               Formatting of this note is   
different from the original.  
Images from the original note   
were not included.  
  
  
DEPARTMENT OF GASTROENTEROLOGY   
AND HEPATOLOGY  
DIGESTIVE DISEASE AND SURGICAL   
INSTITUTE  
[facility]  
  
OUTPATIENT VISIT DATE  
2023  
  
OUTPATIENT VISIT TYPE  
FOLLOW-UP  
  
Patient: Deanna Rowell  
Medical Record: [id number]  
Reason for Follow-Up: IBS  
  
Assessment  
IMPRESSION: Deanna Rowell is   
a 17 year old female with   
pertinent PMH of cholecystectomy   
(abnormal HIDA - ) and   
vaping who presents in follow-up   
for abdominal pain and vomiting   
of 3 years duration. When   
initially evaluated she   
described global abdominal pain   
(cramping in the lower abdomen   
and sharp in the upper abdomen)   
with food intake in the absence   
of NSAIDs with concomitant   
sitaphobia, early satiety,   
nausea and vomiting improved   
with zofran unrelated to   
menstrual cycle and some   
discomfort improved with   
defecation. This was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  
  
Chest pressure - 1 month. No   
burning sensation, sour taste in   
the mouth. Rare regurgitation.  
  
Rare rectal bleeding - once   
every 3 months.  
  
CT abdomen :  
IMPRESSION:  
  
1. Left adnexa is larger than   
the right and there is   
suggestion of a  
fluid-filled tubular structure   
on the left. Pelvic ultrasound   
may be  
helpful, if clinically   
indicated, to evaluate for   
possible hydrosalpinx  
versus ovarian cyst, among other   
etiologies.  
2. Normal appendix.  
  
EGD   
  
EGD/Colonoscopy   
  
Glucose Breath Test   
INTERPERTATION: Negative for   
bacterial overgrowth  
PHYSICIAN: Angel Luis Sow MD  
  
Seen by Dr. Baker in :  
Gastric, duodenal and colon   
biopsies were (-) for HP   
infection, celiac disease and   
microscopic colitis,   
respectively. Tried multiple   
therapies. The following have   
not been effective: Protonix,   
Pepcid, dicyclomine, Prevacid,   
Prilosec, Carafate, gummy fibers   
(2 per day). Welchol was   
prescribed in the fall of last   
year, but resulted in hives and   
pruritus, so this was stopped.   
Nexium helped marginally with   
symptoms when on this   
consistently, but did not help   
with N/V. Zofran was helpful in   
the past  
  
Deanna Rowell is an 16 year   
old female with a history of   
biliary dyskinesia (s/p lap   
morro in 3/2021) who presents to   
the GI clinic for evaluation of   
chronic intermittent episodes of   
post-prandial diarrhea, RUQ/LLQ   
abdominal pain, nausea and   
vomiting.  
  
1. Chronic diarrhea  
2. Abdominal pain (RUQ/LLQ)  
3. Nausea with vomiting  
An exact etiology to explain all   
of these symptoms is difficult   
to pin down. DDx includes IBS-D   
(pain occurs after BMs),   
non-ulcer dyspepsia (given mild   
improvement with Nexium in the   
past, triggered by tomato-based   
foods), functional dyspepsia,   
SIBO and bile acid diarrhea   
(although episodes have been   
present even prior to morro,   
intolerant of Welchol). H.   
Pylori infection and celiac   
disease have been ruled out. EGD   
and colonoscopy showed no   
histologic or endoscopic   
evidence of IBD, although small   
bowel Crohn's disease cannot be   
completely ruled out. CT   
abd/pelv was not suggestive of   
this.  
  
--H+ breath test to rule out   
SIBO  
--Re-trial of Nexium 40 mg/day  
--Zofran 4 mg q8h PRN for   
nausea/vomiting  
--Healthy anti-reflux lifestyle   
modifications reviewed today  
--Keep dietary journal to help   
identify possible triggers  
--Psyllium 1-2 tablespoons per   
day  
--Drink at least 72 oz of water   
per day  
--Minimize/avoid caffeine  
--Low FODMAP diet  
--IBGard 2 capsules TID PRN for   
abdominal pain/cramping  
--Follow up with gyn to address   
adnexal prominence found on CT  
--May consider trial of Elavil   
at next visit if symptoms are no   
better.  
  
Follow up in 2-3 months  
  
Pertinent Review of Systems:  
  
General Review of Systems  
PAIN ASSESSMENT: See HPI  
GENERAL: Weight loss  
HEENT: Negative for frequent or   
significant headaches, No   
changes in hearing or vision, no   
nose bleeds or other nasal   
problems  
NECK: Negative for lumps,   
goiter, pain and significant   
neck swelling  
RESPIRATORY: Negative for cough,   
hemoptysis, wheezing, COPD,   
dyspnea or shortness of breath  
CARDIOVASCULAR: Negative for   
chest pain, leg swelling,   
hypertension, CHF or   
palpitations  
GI: See HPI  
: No history of dysuria,   
frequency or incontinence  
MUSCULOSKELETAL: Negative for   
joint pain or swelling, back   
pain or muscle pain  
SKIN: Negative for lesions,   
rash, and itching  
PSYCH: Negative for sleep   
disturbance, mood disorder and   
recent psychosocial stressors  
HEMATOLOGY/LYMPHOLOGY: Negative   
for prolonged bleeding, bruising   
easily or swollen nodes  
ENDOCRINE: Negative for cold or   
heat intolerance, polyuria,   
polydipsia and goiter  
NEURO: No history of headaches,   
syncope, paralysis, seizures or   
tremors  
  
GI Specific Review of Systems:  
Difficulty swallowing / foods   
sticking in throat: No  
Heartburn: No  
Regurgitation:No  
Filling up quickly at meals: Yes  
Loss of appetite:Yes  
Nausea:Yes  
Vomiting:Yes  
Abdominal pain:Yes  
Bloody or black, bowel   
movements:Yes  
Constipation: No  
Diarrhea:Yes  
Loss of control of bowel   
movements:No  
Night sweats, fever, chills: No  
Vomiting blood: No  
Recent change in weight:No  
Colon polyps:No  
Colon cancer: No  
Crohn's disease / Ulcerative   
colitis: No  
Ulcers: erosions  
Gallstones:No  
Hepatitis / jaundice:No  
Thyroid disease:No  
  
I have confirmed and edited as   
necessary, the PFSH and ROS   
obtained by others.  
  
Past Medical History:  
PAST MEDICAL HISTORY  
Diagnosis Date  
NEGATIVE MEDICAL HISTORY  
  
Past Surgical History:  
PAST SURGICAL HISTORY  
Procedure Laterality Date  
LAPAROSCOPIC CHOLECYSTECTOMY   
2021  
  
Family History:  
FAMILY HISTORY  
Problem Relation Age of Onset  
other (heart attack) Paternal   
Grandfather  
Hypertension Other  
maternal side  
  
FAMILY HISTORY (grandparents,   
parents, brothers, sisters,   
aunts, or uncles)  
Liver Problems: No  
Ulcerative Colitis: No  
Crohn's Disease: No  
Colon Cancer: No  
Colon Polyps: No  
IBS: No  
Celiac disease: No  
Bleeding Disorders: No  
  
Social History:  
Social History  
  
Tobacco Use  
Smoking status: Never  
Passive exposure: Yes  
Smokeless tobacco: Never  
Tobacco comments:  
smokers outside  
Vaping Use  
Vaping Use: Never used  
Substance Use Topics  
Alcohol use: No  
Drug use: No  
  
Tobacco: Vapes  
Alcohol: No  
  
Allergies:  
ALLERGIES  
Allergen Reactions  
Welchol [Colesevela* Hives,   
Itching  
  
Medications:  
  
Current Outpatient Medications  
Medication Sig Dispense Refill  
ondansetron orally   
disintegrating (ZOFRAN ODT) 4 mg   
disintegrating tablet Take 1   
tablet by mouth every 8 hours as   
needed for nausea/vomiting. 10   
tablet 0  
Norethin-Eth Estrad Triphasic   
(ORTHO-NOVUM /, 28,)   
0.5/0.75/1 mg- 35 mcg per tablet   
Take 1 tablet by mouth once   
daily. 84 tablet 3  
Magnesium Oxide 420 mg tab Take   
0.9524 tablets by mouth once   
daily. (Patient not taking:   
Reported on 2023) 30 tablet   
0  
methylPREDNISolone (MEDROL   
DOSE-PACK) 4 mg Dose-Pack As   
Instructed per package (Patient   
not taking: Reported on   
2023) 21 tablet 0  
  
No current facility-administered   
medications for this visit.  
  
Physical Exam:  
  
Vital Signs  
  
VITAL SIGNS: /66   Pulse   
91   Temp (Src) 97.8 (Temporal)   
  Ht 5' 5  (1.65m)   Wt 140 lb   
9.6 oz (63.8kg)   SpO2 98%   LMP   
2022   BMI 23.40 kg/(m^2).  
  
  
General appearance: well   
appearing, alert, in no acute   
distress  
Skin: no rashes or lesions  
Head: normal  
Eyes: Anicteric sclera.  
ENT: No oral erythema  
Neck: Supple  
Lymph nodes: No Submandibular   
lymphadenopathy present  
Lungs: lungs clear to   
auscultation, no wheezing or   
rhonchi  
Heart: RRR without murmur  
Abdomen: Abdomen soft,   
non-tender. Bowel sounds normal.   
Negative carnetts (chaperone   
present in A3)  
Extremities: Extremities normal.  
Musculoskeletal: Muscular   
strength grossly intact  
Neuro: CN2-12 grossly intact  
  
Labs:  
CBC Latest Ref Rng & Units   
2021 10/9/2021 2022  
WBC 3.70 - 11.00 k/uL 5.82   
16.09(H) 5.83  
RBC 3.90 - 5.20 m/uL 4.20 3.99   
4.37  
HEMOGLOBIN 11.5 - 15.5 g/dL 12.4   
11.5 11.7  
HEMOGLOBIN, HOANG 12.0 - 16.0   
g/dL - - -  
HEMATOCRIT 36.0 - 46.0 % 38.9   
35.5(L) 36.4  
MCV 80.0 - 100.0 fL 92.6 89.0   
83.3  
MCH 26.0 - 34.0 pg 29.5 28.8   
26.8  
MCHC 30.5 - 36.0 g/dL 31.9 32.4   
32.1  
RDW-CV 11.5 - 15.0 % 13.2 13.0   
15.1(H)  
PLATELETS 150 - 400 k/uL 311 276   
255  
MPV 9.0 - 12.7 fL 10.7 10.6 9.9  
BASO% % 0.5 0.2 0.3  
ABS NEUT (ANC) 1.45 - 7.50 k/uL   
3.60 13.57(H) 3.59  
ABS LYMPH 1.00 - 4.00 k/uL 1.62   
1.28 1.65  
ABS MONO <0.87 k/uL 0.52 1.19(H)   
0.54  
ABS EOSIN <0.46 k/uL 0.04 <0.03   
<0.03  
ABS BASO <0.11 k/uL 0.03 0.03   
<0.03  
NRBC /100 WBC - - 0.0  
DIFF TYPE - Auto Diff Auto Diff   
-  
  
CMP Latest Ref Rng & Units   
2021 10/9/2021 2022  
SODIUM 136 - 144 mmol/L 140 138   
140  
POTASSIUM 3.7 - 5.1 mmol/L 4.0   
3.8 3.8  
CHLORIDE 97 - 105 mmol/L 104 103   
106(H)  
CO2 22 - 30 mmol/L 23 22 26  
GLUCOSE 74 - 99 mg/dL 78 88 74  
BUN 7 - 21 mg/dL 10 8 7  
CREATININE 0.58 - 0.96 mg/dL   
0.74 0.63 0.66  
EGFR >=60 mL/min/1.73m - - 130  
EGFR-ALL OTHER RACES . - >60 -  
EGFR- - - >60 -  
PROTEIN, TOTAL 6.3 - 8.0 g/dL   
7.8 7.6 -  
ALBUMIN 3.9 - 4.9 g/dL 4.4 4.3 -  
CALCIUM, TOTAL 8.5 - 10.2 mg/dL   
9.7 9.2 9.1  
BILIRUBIN, TOTAL 0.2 - 1.3 mg/dL   
0.3 0.4 -  
AST 13 - 35 U/L 26 25 -  
ALT 7 - 38 U/L 20 29 -  
ALKALINE PHOSPHATASE 45 - 87 U/L   
68 78 -  
  
  
SIGNATURE:  
Bijan Reeves MD, Cory, MS, EdM   
PATIENT NAME:  
Deanna Rowell  
DATE OF BIRTH  
2005  
MRN:  
[id number]  
  
  
Electronically signed by Bijan Reeves MD at 2023 6:35 AM   
EST  
documented in this encounter            [facility]  
   
                                        2023 Instructions   
  
  
NAYAN Encarnacion.CNP -   
2023 2:44 PM ESTFormatting   
of this note might be different   
from the original.  
VOMITING  
  
Vomiting can be caused by many   
different medical problems   
(stomach trouble, nervous system   
problems, alcohol and drug   
toxicity, infections). Whatever   
the cause, repeated vomiting can   
lead to dehydration and severe   
weakness. The treatment for   
vomiting includes:  
  
~Rest. Do not drink or eat   
anything for at least 1 hour, or   
until the stomach settles. Start   
drinking small amounts of clear   
liquids (water, sodas, Gatorade,   
juices) as tolerated  
  
~Medication to stop vomiting   
(anti-emetic drugs) may be given   
by injection, rectal   
suppository, or orally, as   
needed  
  
~After you can keep down clear   
liquids, other light foods   
(gelatin, soup, bread) can be   
started. Avoid alcohol, dairy   
products, and richer foods for   
several days until you are   
completely better.  
  
Please call your doctor right   
away if your condition is not   
better in 1-2 days. Call right   
away or go to the emergency   
department if you cannot take   
any oral fluids, if you vomit   
blood, if you have increased   
pain, fainting, fever, or other   
serious symptoms.  
Electronically signed by ZOE EncarnacionCNP at 2023   
2:44 PM EST  
  
documented in this encounter            [facility]  
   
                                                    2023 History of   
Present illness Narrative               Formatting of this note might be   
different from the original.  
This note was created using   
Mobi[employer].  
Subjective  
Deanna Rowell is a 17 year   
old female.  
  
HPI by patient:  
Deanna is a 17 year old   
presenting to the office with   
the complaint of N/V  
Started approximately 2 week ago  
Associated symptoms include   
nausea, migraines, vomiting.   
Vomiting a lot over night 12-4   
am she sleeps on her bathroom   
floor and vomits regularly  
Denies any other concerns. She   
ruled out pregnancy via home   
test and also at hospital. She   
has seen her PCP and been to the   
ED with these complaints over   
the last two weeks and was told   
to follow up with GI. She is   
unable to get in for a few   
weeks.  
Vaccinated for influenza: unsure  
Covid Immunization Dates  
Overdue - COVID-19 VACCINE (1)   
Overdue - never done  
No completion, postpone,   
frequency change, or   
communication history  
exists for this topic.  
  
  
  
Personal history of Covid: no  
Flu/RSV contacts: no  
Strep contacts: no  
Sick contacts: no  
Covid + contacts: no  
Travel in the last 14 days: no  
Smoking history/second hand   
smoke: no  
OTC tylenol and excedrin for the   
migraines  
No antibiotic use in the last 60   
days.  
  
  
  
ALLERGIES  
Welchol [Colesevela* Hives,   
Itching  
Family History Reviewed   
Including Cardiac Diseases,   
Psychiatric Diseases, &   
Substance Abuse  
Problem: other (heart attack)  
Relation: Paternal Grandfather  
Age of Onset: (Not Specified)  
Problem: Hypertension  
Relation: Other  
Age of Onset: (Not Specified)  
Comment: maternal side  
Social History  
Tobacco Use  
Smoking status: Never  
Smokeless tobacco: Never  
Tobacco comments: smokers   
outside  
Vaping Use  
Vaping Use: Never used  
Alcohol use: No  
Drug use: No  
  
Review of Systems  
Constitutional: Negative for   
chills and fever.  
HENT: Negative for congestion,   
ear pain, rhinorrhea and sore   
throat.  
Respiratory: Negative for cough.  
Cardiovascular: Negative for   
chest pain.  
Gastrointestinal: Positive for   
nausea and vomiting. Negative   
for diarrhea.  
Allergic/Immunologic: Negative   
for immunocompromised state.  
Hematological: Negative for   
adenopathy.  
  
Objective  
/78   Pulse 80   Temp 37.1   
C (98.7 F) (Tympanic)   Resp 18   
  Wt 65.4 kg (144 lb 3.2 oz)     
LMP 2022 (Approximate)     
SpO2 100%  
Physical Exam  
Vitals and nursing note   
reviewed.  
Constitutional:  
Appearance: She is   
ill-appearing.  
HENT:  
Right Ear: Tympanic membrane and   
ear canal normal.  
Left Ear: Tympanic membrane and   
ear canal normal.  
Nose: Nose normal. No congestion   
or rhinorrhea.  
Mouth/Throat:  
Pharynx: Uvula midline.   
Posterior oropharyngeal erythema   
present. No oropharyngeal   
exudate.  
Cardiovascular:  
Rate and Rhythm: Normal rate and   
regular rhythm.  
Heart sounds: Normal heart   
sounds.  
Pulmonary:  
Effort: Pulmonary effort is   
normal.  
Breath sounds: Normal breath   
sounds.  
Lymphadenopathy:  
Cervical: No cervical   
adenopathy.  
Skin:  
General: Skin is warm and dry.  
Neurological:  
Mental Status: She is alert and   
oriented to person, place, and   
time.  
  
Assessment and Plan  
ASSESSMENT/PLAN:  
1. Nausea and vomiting,   
unspecified vomiting type -   
ICD9: 787.01, ICD10: R11.2   
(primary diagnosis)  
- ONDANSETRON 4 MG   
DISINTEGRATING TABLET  
- MAGNESIUM OXIDE 420 MG TABLET  
- COVID WITH FLUA+B, ROUTINE  
- Pt instructed to present to ED   
if symptoms get worse.  
  
2. Headache, unspecified   
headache type - ICD9: 784.0,   
ICD10: R51.9  
- MAGNESIUM OXIDE 420 MG TABLET  
- METHYLPREDNISOLONE 4 MG   
TABLETS IN A DOSE PACK  
- COVID WITH FLUA+B, ROUTINE  
  
Delmis Khanna APRN.CNP  
  
Medical Decision Making:  
  
Problems:  
Moderate: New problem with   
uncertain prognosis  
Data:  
Unique test(s) ordered: 2  
Risk:  
Moderate: Drug management  
  
Medical Decision Making Level: 4   
- Moderate  
This patient encounter involved   
the screening or treatment of   
novel coronavirus infection   
(COVID-19).  
  
Electronically signed by Delmis Khanna APRN.CNP at 2023   
2:44 PM EST  
documented in this encounter            [facility]  
   
                                                    2022 History of   
Present illness Narrative               Formatting of this note is   
different from the original.  
PEDIATRIC SICK VISIT  
  
SERVICE DATE: 2022  
  
SUBJECTIVE:  
Deanna Rowell is a 17 year   
old accompanied by mother.  
Patient presents with:  
Follow Up: Presents today for a   
follow up from 22   
appointment.  
  
History was obtained from:   
patient  
Continues to have chest pressure  
Worse when laying down  
Yesterday wearing a N95, face   
shield and gown she felt sob   
(she wears this frequently in   
her job).  
Throwing up at least once per   
day-stopped eating so she won't   
throw up anymore  
Last time she had diarrhea was   
three days ago. Normal bms now  
  
Also some upper neck pain, full   
rom. Does ride horses.  
  
HISTORY:  
ACTIVE PROBLEM LIST  
Episodic Tension-Type Headache,   
Not Intractable  
Chronic Left-Sided Low Back Pain   
Without Sciatica  
Biliary Dyskinesia  
Childhood Overweight, Bmi   
85-94.9 Percentile  
Covid-19 Vaccination Declined  
  
PAST MEDICAL HISTORY  
Diagnosis Date  
NEGATIVE MEDICAL HISTORY  
  
PAST SURGICAL HISTORY  
Procedure Laterality Date  
LAPAROSCOPIC CHOLECYSTECTOMY   
2021  
  
Allergies:  
ALLERGIES  
Allergen Reactions  
Welchol [Colesevela* Hives,   
Itching  
  
Medications:  
Norethin-Eth Estrad Triphasic   
(ORTHO-NOVUM , 28,)   
0.5/0.75/1 mg- 35 mcg per tablet   
Take 1 tablet by mouth once   
daily.  
  
OBJECTIVE:  
/68   Pulse 100   Temp   
37.2 C (99 F) (Temporal)   Resp   
16   Wt 65.8 kg (145 lb)   LMP   
2022 (Approximate)  
General: alert and active in no   
apparent distress  
Eyes: conjunctiva clear  
Ears: TMs translucent   
bilaterally, normal landmarks   
noted  
Nose: no rhinorrhea, no mucosal   
edema  
OP: no lesions, no erythema  
Neck: supple, no adenopathy  
Lungs: clear to auscultation   
bilaterally, good air exchange,   
no retractions  
CVS: Normal rate, regular   
rhythm, no murmur  
Abdomen: soft, nondistended,   
nontender, and no   
hepatosplenomegaly or masses  
Skin: No rashes, lesions or skin   
changes  
  
ASSESSMENT/PLAN:  
Encounter Diagnosis  
ICD-10-CM  
1. Vomiting without nausea,   
unspecified vomiting type R11.11   
STREP A MOLECULAR (POC)  
CONSULT TO GASTROENTEROLOGY  
  
  
-take nexium at least for 2   
weeks  
-probiotic for at least 2 weeks  
-call if symptoms worsening or   
continue to not improve  
-see GI (appt 23)  
  
SIGNATURE: Neto Talbot APRN.CNP PATIENT NAME: Deanna Rowell  
DATE: 2022 MRN:   
[id number]  
TIME: 3:50 PM  
  
Electronically signed by Neto Talbot APRN.CNP at 2022   
8:14 PM EST  
documented in this encounter            [facility]  
   
                                        2022 Instructions   
  
  
Neto Talbot APRN.CNP -   
2022 3:51 PM ESTFormatting   
of this note is different from   
the original.  
  
5 to Go!TM  
Healthy Kids Inside & Out  
  
5 Eat FIVE fruits and veggies a   
day  
4 Give and get FOUR compliments   
a day  
3 Consume THREE calcium products   
a day  
2 Limit media time to TWO hours   
a day  
1 Get at least ONE hour of   
exercise a day  
0 Consume ZERO sugar-sweetened   
drinks  
Go! Be healthy, inside and out!  
  
[email or web address]  
  
Electronically signed by Neto Talbot APRN.CNP at 2022   
3:51 PM EST  
  
documented in this encounter            [facility]  
   
                                                    2022 History of   
Present illness Narrative               Formatting of this note might be   
different from the original.  
Patient triaged at [facility].   
Here today with worsening chest   
pressure. Denies uri symptoms. I   
will refer to ER. In no apparent   
distress at time of triaged.   
Declines squad.  
  
Electronically signed by   
Alber Nicholson APRN.CNP at   
2022 6:34 PM EST  
documented in this encounter            [facility]  
   
                                                    2022 Miscellaneous   
Notes                                   Formatting of this note might be   
different from the original.  
Reviewed  
Ashley Sierra DO  
  
Electronically signed by Ashley Sierra DO at 2022 2:14 PM   
EDT  
Formatting of this note might be   
different from the original.  
Answer Assessment - Initial   
Assessment Questions  
1. LOCATION:  Where does it   
hurt?   
  
2. RADIATION:  Does the pain go   
anywhere else?  (e.g., into   
neck, jaw, arms, back)  
  
3. ONSET:  When did the chest   
pain begin?  (Minutes, hours or   
days)  
  
4. PATTERN  Does the pain come   
and go, or has it been constant   
since it started?   Does it get   
worse with exertion?   
  
5. DURATION:  How long does it   
last  (e.g., seconds, minutes,   
hours)  
  
6. SEVERITY:  How bad is the   
pain?  (e.g., Scale 1-10; mild,   
moderate, or severe)  
- MILD (1-3): doesn't interfere   
with normal activities  
- MODERATE (4-7): interferes   
with normal activities or   
awakens from sleep  
- SEVERE (8-10): excruciating   
pain, unable to do any normal   
activities  
  
7. CARDIAC RISK FACTORS:  Do you   
have any history of heart   
problems or risk factors for   
heart disease?  (e.g., angina,   
prior heart attack; diabetes,   
high blood pressure, high   
cholesterol, smoker, or strong   
family history of heart disease)  
  
8. PULMONARY RISK FACTORS:  Do   
you have any history of lung   
disease?  (e.g., blood clots in   
lung, asthma, emphysema, birth   
control pills)  
  
9. CAUSE:  What do you think is   
causing the chest pain?   
  
10. OTHER SYMPTOMS:  Do you have   
any other symptoms?  (e.g.,   
dizziness, nausea, vomiting,   
sweating, fever, difficulty   
breathing, cough)  
  
11. PREGNANCY:  Is there any   
chance you are pregnant?   When   
was your last menstrual period?   
  
  
Mom calling.  
Patient did not go to  or ER   
last night. She was scheduled   
for an OV at her college clinic   
this morning but slept through   
it and missed it. Today she told   
her mom she is still nauseas,   
not eating,  and now   
having sharp CP. Mom could give   
no further details, patient is   
currently driving home from   
college. Advised ER for   
assessment.  
  
Protocols used: Chest   
Pain-ADULT-AH  
  
Electronically signed by Shweta Lucas RN at 2022   
2:14 PM EDT  
documented in this encounter            [facility]  
   
                                                    2022 Miscellaneous   
Notes                                   Formatting of this note might be   
different from the original.  
Agree, thank you.  
/NAYAN Smith.CNP  
Electronically signed by   
Regine Potter APRN.CNP at   
2022 5:26 PM EDT  
Formatting of this note might be   
different from the original.  
Answer Assessment - Initial   
Assessment Questions  
1. SEVERITY:  How many times has   
he vomited today?   Over how   
many hours?   
- MILD:1-2 times/day  
- MODERATE: 3-7 times/day  
- SEVERE: 8 or more times/day,   
vomits everything or repeated   
 dry heaves  on an empty stomach  
Emesis started yesterday, mom is   
uncertain how many times pt has   
vomited yesterday and today  
2. ONSET:  When did the vomiting   
begin?   
Pt woke yesterday with  puffy   
eyes  and felt nauseated. Mom   
unsure when vomiting began and   
how many times pt vomited   
yesterday. Pt has vomited today   
as well, mom unsure of how many   
times. Also uncertain of   
description of the emesis. Mom   
reports the eye swelling is   
resolved in full as of today.  
3. FLUIDS:  What fluids has he   
kept down today?   What fluids   
or food has he vomited up   
today?   
Mom reports that pt has been   
drinking water with lemon. Is   
uncertain of volume she has been   
able to keep down but believes   
that pt is tolerating water  
4. HYDRATION STATUS:  Any signs   
of dehydration?  (e.g., dry   
mouth [not only dry lips], no   
tears, sunken soft spot)  When   
did he last urinate?   
Mom does not believe so, but is   
uncertain. She is unsure how   
many times pt has voided in the   
past 24 hours  
5. CHILD'S APPEARANCE:  How sick   
is your child acting?    What is   
he doing right now?  If asleep,   
ask:  How was he acting before   
he went to sleep?   
Pt is 2 hours away at school, is   
in [place]. Mom denies known   
fevers and diarrhea. She notes   
that pt told her she had been   
feeling  hot and like my insides   
are shaking.  Pt also told mom   
that she also has abdominal   
pain, mom unsure of additional   
details regarding the pain. Pt   
was evaluated today by the nurse   
at her school and her b/p was   
136/93 and . Mom says that   
pt was evaluated at a walk-in   
over the summer when her   
fingernail was ripped off and   
her b/p at that time was   
142/102. Mom verbalized concern   
regarding these elevated b/p's.  
6. CONTACTS:  Is there anyone   
else in the family with the same   
symptoms?   
N/a pt lives away at school. Mom   
is uncertain of sick exposures  
7. CAUSE:  What do you think is   
causing your child's vomiting?   
Mom unsure. Says that pt had   
covid testing today at school   
and was negative  
  
Protocols used: Vomiting Without   
Diarrhea-PEDIATRIC-  
  
Spoke with pt initially-she gave   
permission to discuss medical   
information with mother and   
requested nursing call and talk   
with her mom regarding her   
symptoms.  
  
Advised walk-in/UC evaluation   
today instead of waiting until   
tomorrow since a large amount of   
information was unclear/unknown   
by mother during triage.   
Explained that in-person   
evaluation would be best to   
further assess her abdominal   
pain, b/p, and other symptoms.   
Mom states that pt's roommate   
has a car and can take her to a   
walk-in clinic today. Suggested   
pt have b/p checked when she   
arrives for her exam but then   
request a recheck at the end of   
the visit to compare the 2   
readings. Mom asking  if her b/p   
is still high at the walk-in,   
what do we do?  Advised best to   
ask the provider assessing   
Deanna what is recommended   
since they will be evaluating   
her and have all of her health   
information available.  
  
Mom agreeable to this plan.   
Reviewed supportive   
interventions for vomiting, also   
reviewed s/sx indicating need   
for ER evaluation. Mom   
verbalized understanding. Mom   
denies any additional concerns   
or questions at this time.  
Electronically signed by Deysi Sanchez RN at 2022 4:51   
PM EDT  
Formatting of this note might be   
different from the original.  
Please triage  
Carleen Becerra LPN  
  
Electronically signed by Carleen Becerra LPN at 2022 3:11 PM   
EDT  
Formatting of this note might be   
different from the original.  
Pt's mother called into Dr. Sierra's office because pt is at   
college 2 hours away and woke up   
yesterday vomiting and with   
puffy eyes. She is scheduled at   
her wellness center at school   
for . Pt's mother reports   
that the pt's BP is 136/93 and   
.  
She also has a negative covid   
test.  
  
Pt's mother is looking for   
advice to see if waiting to be   
seen tomorrow is okay or if she   
should go to an urgent care/ER   
today?  
  
Pt's mother Ritu phone number:   
[phone]  
  
Please advise.  
Electronically signed by Ritu Patterson at 2022 12:29 PM   
EDT  
documented in this encounter            [facility]  
   
                                                    08- History of   
Present illness Narrative               Formatting of this note is   
different from the original.  
Chaperone offered: Patient   
accepts, visit chaperoned by   
Priscila Vega MA.  
  
HPI:  
Deanna Rowell is a 16 year   
old  female  
Chief Comlaint: Patient presents   
with:  
Vaginal Problem: Size of golf   
ball- noticed it  night-   
painful  
  
The patient presents with   
abscess formation on left inner   
labia and symptoms have been   
present for 2 days. Associated   
symptoms include pain and   
subcutaneous mass.  
History of Diabetes? No  
  
PAST MEDICAL HISTORY  
Diagnosis Date  
NEGATIVE MEDICAL HISTORY  
  
PAST SURGICAL HISTORY  
Procedure Laterality Date  
LAPAROSCOPIC CHOLECYSTECTOMY   
2021  
  
Current Outpatient Medications  
Medication Sig  
Norethin-Eth Estrad Triphasic   
(ORTHO-NOVUM , 28,)   
0.5/0.75/1 mg- 35 mcg per tablet   
Take 1 tablet by mouth once   
daily.  
sulfamethoxazole-trimethoprim   
(BACTRIM DS) 800-160 mg per   
tablet Take 1 tablet by mouth   
twice daily for 7 days.  
  
No current facility-administered   
medications for this visit.  
  
ALLERGIES  
Allergen Reactions  
Welchol [Colesevela* Hives,   
Itching  
  
Social History  
  
Tobacco Use  
Smoking status: Never  
Passive exposure: Yes  
Smokeless tobacco: Never  
Tobacco comments:  
smokers outside  
Vaping Use  
Vaping Use: Never used  
Substance Use Topics  
Alcohol use: No  
Drug use: No  
  
PHYSICAL EXAMINATION:  
General Appearance: Well   
appearing, alert, in no acute   
distress, well-hydrated, well   
nourished.  
Lungs: normal inspiratory effort  
  
Findings: left labial abscess  
IMPRESSION: Sebaceous cyst with   
abscess formation.  
  
UNIVERSAL PROTOCOL / SAFETY   
CHECKLIST  
  
Procedure to be Performed:   
Incision and drainage of vulvar   
abscess  
  
Sign In:  
A Moment of CARE was completed.  
Personnel directly involved with   
the procedure wore the   
appropriate PPE (Personal   
Protective Equipment).  
  
Patient/Surrogate   
Stated/Verified: PATIENT   
VERIFIED(optional for EMERGENT   
procedures): Patient name, Date   
of Birth, Relevant allergies,   
and The intended procedure  
  
Time Out Communication:  
Intended patient and procedure   
match the source documents.  
Consent documented and matches   
the intended procedure.  
  
Sign Out:  
SIGN OUT (optional for EMERGENT   
procedures): All specimen   
containers correctly labeled.  
All instruments, equipment,   
possible retained foreign bodies   
accounted for.  
Post-procedure follow-up   
management communicated and Plan   
of Care Visit completed when   
applicable.  
  
Socorro Zavala APRN.CNP  
  
PLAN: After informed consent was   
obtained, using Betadine for   
cleansing and 1% LIDOCAINE WITH   
1:100,000 EPINEPHRINE BUFFERED   
for anesthetic, with sterile   
technique, an incision was made   
into the abscess cavity which   
was then drained of purulent   
material. Culture was obtained.   
Procedure was well tolerated.   
Dressing was applied and wound   
care instructions were provided.   
frequent warm tub soaks until   
abscess resolves. Medication(s)   
given today: Yes: Bactrim 500 mg   
BID x 7 days  
  
Return to clinic as needed for   
pain, increased swelling, or   
fever.  
  
Socorro Zavala APRN.CNP  
  
  
Electronically signed by Socorro Zavala APRN.CNP at 08/10/2022   
8:00 AM EDT  
documented in this encounter            [facility]  
   
                                                    2022 History of   
Present illness Narrative                 
  
  
Formatting of this note is   
different from the original.  
Patient presents with:  
Acute Visit: nail detached from   
nail bed on left hand x 1 day  
  
HPI:  
Left ring finger nail lifted at   
the end of May. She was treated   
with bactrim and fingertip   
splint and the infection   
resolved. She reinjured the nail   
today while lifting something.   
The nail is loose and some   
pulled out at the proximal   
corner.  
  
MEDICATIONS:  
  
Norethin-Eth Estrad Triphasic   
(ORTHO-NOVUM , 28,)   
0.5/0.75/1 mg- 35 mcg per tablet   
Take 1 tablet by mouth once   
daily.  
  
ALLERGIES:  
ALLERGIES  
Allergen Reactions  
Welchol [Colesevela* Hives,   
Itching  
  
VITALS:  
/102   Pulse 70   Temp   
37.2 C (98.9 F)   Resp 21   Wt   
68.9 kg (152 lb)   LMP   
2022 (Approximate)   SpO2   
98%  
  
PE:  
Pleasant, in no acute distress.  
Finger: Left ring. The nail is   
loose to the hyponychium and the   
nail bed is dry below. The   
proximal lateral corner is   
lifted above the epionychium.   
There is mild erythema of the   
medial finger.  
  
ASSESSMENT/PLAN:  
1. Avulsion of fingernail,   
subsequent encounter - ICD9:   
V58.89, 883.0, ICD10: S61.309D  
1% lidocaine was injected along   
the medial and lateral aspect of   
the finger. Nail nippers were   
used to trim the distal 3/4 of   
the nail off including the   
proximal corner which had lifted   
above the epionychium.  
Finger tip splint provided for   
protection of the nail bed.  
  
Lionel Garcia MD  
  
  
  
Electronically signed by Lionel Garcia MD at 2022 3:15   
PM EDTdocumented in this   
encounter                               [facility]  
   
                                                    2022 Miscellaneous   
Notes                                     
  
  
Formatting of this note might be   
different from the original.  
Left detailed msg on VM that Dr. Sierra can not write an exception   
letter excusing her from getting   
the Covid vaccine unless she has   
a medical documented reason   
explaining why she can not get   
the vaccine.  
Carleen Becerra LPN  
  
  
  
Electronically signed by Carleen Becerra LPN at 2022 9:46 AM   
EDT  
  
  
Formatting of this note might be   
different from the original.  
Patient's mom called. She said   
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   
home. In order to work there she   
needs to get the covid vaccine,   
however, mom said patient   
doesn't want to get the vaccine.   
Mom was wondering whether   
patient should come in for appt   
to discuss this or if provider   
can write a note for her   
excusing her from getting the   
vaccine? Her PCP is Dr. Sierra,   
but sending to covering provider   
since he is out. Please advise.   
Thank you.  
  
Joann Garces  
  
  
  
Electronically signed by Joann Garces at 2022 10:16 AM   
EDTdocumented in this encounter         [facility]  
   
                                                    2022 History of   
Present illness Narrative                 
  
  
Formatting of this note is   
different from the original.  
Images from the original note   
were not included.  
Subjective  
HPI  
HPI Deanna Rowell is a 16   
year old female who presents   
today for CC of left ring finger   
nail lifted off. This started 5   
days ago. Has tried peroxid   
soaking. Symptoms are worsened   
by touching nail. Denies   
numbness/tingling of left ring   
finger.  
  
.Patient presents with:  
left ring fingernail pain:   
ripped fingernail off last   
friday  
  
PAST MEDICAL HISTORY  
Diagnosis Date  
NEGATIVE MEDICAL HISTORY  
  
  
PAST SURGICAL HISTORY  
Procedure Laterality Date  
LAPAROSCOPIC CHOLECYSTECTOMY   
2021  
  
ALLERGIES Welchol [Colesevelam]  
  
MEDICATIONS  
Norethin-Eth Estrad Triphasic   
(ORTHO-NOVUM , 28,)   
0.5/0.75/1 mg- 35 mcg per tablet   
Take 1 tablet by mouth once   
daily.  
sulfamethoxazole-trimethoprim   
(BACTRIM DS) 800-160 mg per   
tablet Take 1 tablet by mouth   
twice daily for 10 days.  
  
FAMILY HISTORY  
Problem Relation Age of Onset  
other (heart attack) Paternal   
Grandfather  
Hypertension Other  
maternal side  
  
Social History  
  
Tobacco Use  
Smoking status: Passive Smoke   
Exposure - Never Smoker  
Smokeless tobacco: Never Used  
Tobacco comment: smokers outside  
Vaping Use  
Vaping Use: Never used  
Substance Use Topics  
Alcohol use: No  
Drug use: No  
  
ROS  
  
  
Objective  
Blood pressure 126/78, pulse 72,   
temperature 37.2 C (99 F),   
temperature source Tympanic,   
resp. rate 18, weight 68.8 kg   
(151 lb 9.6 oz), last menstrual   
period 2022.  
  
Physical Exam  
Constitutional:  
General: She is not in acute   
distress.  
Appearance: She is not   
toxic-appearing or diaphoretic.  
HENT:  
Head: Normocephalic and   
atraumatic.  
Pulmonary:  
Effort: Pulmonary effort is   
normal. No accessory muscle   
usage or respiratory distress.  
Musculoskeletal:  
Hands:  
  
Neurological:  
Mental Status: She is alert and   
oriented to person, place, and   
time.  
  
ASSESSMENT/PLAN:  
1. Finger infection - ICD9:   
686.9, ICD10: L08.9  
Start bactrim  
- No lymphangetic streaking,   
this was defined for patient to   
watch for and to seek medical   
care immediately if appears  
- Follow up for recheck in three   
days  
Keep clean and covered. Will   
call hand specialist tomorrow.  
- SULFAMETHOXAZOLE 800   
MG-TRIMETHOPRIM 160 MG TABLET  
  
Agrees to plan  
  
NAYAN Goldman.BRIAN  
  
  
  
Electronically signed by   
Alber Nicholson APRN.CNP at   
2022 5:09 PM EDTdocumented   
in this encounter                       [facility]  
   
                                                    2022 Miscellaneous   
Notes                                     
  
  
Formatting of this note might be   
different from the original.  
Patient returned call and went   
over results, notes from urgent   
care provider with   
understanding. Aware rx to   
pharmacy.  
  
  
Electronically signed by Emerita Rincon LPN at 2022   
10:00 AM EDT  
  
  
Formatting of this note might be   
different from the original.  
Left message for patient to   
return call for results.Ivania Payne LPN  
  
  
  
Electronically signed by Ivania Payne LPN at 2022 8:22   
AM EDT  
  
  
Formatting of this note might be   
different from the original.  
Vaginal test was positive for   
yeast.  
Rx for antifungal medicine sent   
to the pharmacy.  
Urine culture is not complete   
yet.  
  
  
Electronically signed by Lionel Garcia MD at 2022 7:56   
AM EDTdocumented in this   
encounter                               [facility]  
   
                                                    2022 History of   
Present illness Narrative                 
  
  
Formatting of this note is   
different from the original.  
This note was created using   
[employer]obi[employer].  
Subjective  
Deanna Rowell is a 16 year   
old female.  
  
HPI  
Presents with urinary urgency   
and frequency since yesterday.   
She does have a history of UTI   
in January and this felt   
similar. She states she is also   
had increased vaginal discharge   
and odor the past week. She did   
change birth controls recently.   
Last menstrual cycle she states   
was 3 weeks ago. She denies   
abdominal pain or back pain. No   
blood in urine. She denies   
chance of pregnancy. She states   
a month or 2 ago she did test   
positive for chlamydia but has   
not been sexually active since   
then. She was treated with   
azithromycin. She states she   
also had bacterial vaginosis at   
that time and was treated.  
  
Review of Systems  
Gastrointestinal: Negative for   
abdominal pain, diarrhea, nausea   
and vomiting.  
Genitourinary: Positive for   
frequency, urgency and vaginal   
discharge. Negative for   
decreased urine volume, dysuria,   
flank pain, hematuria, pelvic   
pain, vaginal bleeding and   
vaginal pain.  
Musculoskeletal: Negative for   
back pain.  
All other systems reviewed and   
are negative.  
  
PAST MEDICAL HISTORY  
Diagnosis Date  
NEGATIVE MEDICAL HISTORY  
  
Current Outpatient Medications  
Medication Sig Dispense Refill  
Norethin-Eth Estrad Triphasic   
(ORTHO-NOVUM , 28,)   
0.5/0.75/1 mg- 35 mcg per tablet   
Take 1 tablet by mouth once   
daily. 84 tablet 3  
nitrofurantoin monohydrate and   
macrocrystal (MACROBID) 100 mg   
capsule Take 1 capsule by mouth   
twice daily with meals for 5   
days. 10 capsule 0  
  
No current facility-administered   
medications for this visit.  
  
PAST SURGICAL HISTORY  
Procedure Laterality Date  
LAPAROSCOPIC CHOLECYSTECTOMY   
2021  
  
FAMILY HISTORY  
Problem Relation Age of Onset  
other (heart attack) Paternal   
Grandfather  
Hypertension Other  
maternal side  
  
Social History  
  
Tobacco Use  
Smoking status: Passive Smoke   
Exposure - Never Smoker  
Smokeless tobacco: Never Used  
Tobacco comment: smokers outside  
Vaping Use  
Vaping Use: Never used  
Substance Use Topics  
Alcohol use: No  
Drug use: No  
  
Objective  
/76   Pulse 115   Temp (!)   
35.8 C (96.5 F)   Resp 18   Wt   
69.3 kg (152 lb 12.8 oz)   LMP   
2022   SpO2 99%  
Physical Exam  
Vitals reviewed.  
Constitutional:  
Appearance: Normal appearance.  
HENT:  
Head: Normocephalic and   
atraumatic.  
Cardiovascular:  
Rate and Rhythm: Normal rate and   
regular rhythm.  
Pulses: Normal pulses.  
Heart sounds: Normal heart   
sounds.  
Pulmonary:  
Effort: Pulmonary effort is   
normal.  
Breath sounds: Normal breath   
sounds.  
Abdominal:  
General: Abdomen is flat. Bowel   
sounds are normal.  
Palpations: Abdomen is soft.  
Tenderness: There is no   
abdominal tenderness. There is   
no right CVA tenderness, left   
CVA tenderness, guarding or   
rebound.  
Genitourinary:  
Comments: deferred  
Musculoskeletal:  
Cervical back: Neck supple.  
Neurological:  
Mental Status: She is alert.  
  
Assessment and Plan  
ASSESSMENT/PLAN:  
1. Urinary frequency - ICD9:   
788.41, ICD10: R35.0 (primary   
diagnosis)  
acute  
- UA positive for kathi esterase,   
hematuria and proteinuria  
- Send urine for culture  
- Begin treatment with Macrobid   
100 mg BID for 5 days  
- UA DIP, URINE (POC)  
- URINE CULTURE  
  
2. Vaginal discharge - ICD9:   
623.5, ICD10: N89.8  
Patient preferred self swabs to   
pelvic exam. Will call on   
results and treat accordingly.  
- BACTERIAL VAGINOSIS   
AMPLIFICATION  
- GC/CHLAMYDIA DNA DET  
- MOLLY / TRICHOMONAS   
AMPLIFICATION  
  
Sydnie Mccormick PA-C  
  
  
  
Electronically signed by Sydnie Mccormick PA-C at 2022 9:45 AM   
EDTdocumented in this encounter         [facility]  
   
                                                    2022 Miscellaneous   
Notes                                     
  
  
Formatting of this note might be   
different from the original.  
Patient notified. Health   
department form faxed.  
Asmita Johnson RN  
  
  
  
  
Electronically signed by Asmita Johnson RN at 2022 1:49 PM   
EDT  
  
  
Formatting of this note might be   
different from the original.  
Left message for patient to call   
office. Health department form   
by  phone nurse.  
Asmita Johnson RN  
  
  
  
  
Electronically signed by Asmita Johnson RN at 2022 1:47 PM   
EDT  
  
  
Formatting of this note might be   
different from the original.  
Please call and notify patient   
of positive Chlamydia and   
bacterial vaginosis. Rx sent for   
doxycycline 100 Mg PO BID x 7   
days (for chlamydia) and Flagyl   
500 mg PO BID x 7 days for   
bacterial vaginosis. Patient   
will need to finish all   
medications. Partners will need   
treated. She will need to be   
seen in office in 3 months for   
CRISTINE. Kristi Reyes APRN.CNM  
  
  
  
Electronically signed by   
Kristi Reyes APRN.CNM at   
2022 1:08 PM EDTdocumented   
in this encounter                       [facility]  
   
                                                    2022 History of   
Present illness Narrative                 
  
  
Formatting of this note is   
different from the original.  
Deanna Rowell is a 16 year   
old female who presents for   
problem visit.  
  
HPI: Partner tested positive for   
chlamydia on Friday. They had   
intercourse before he was   
tested. They have been together   
since January, but partner slept   
with someone else recently. She   
is not having any symptoms right   
now. They did not use a condom.  
  
OB History  
No obstetric history on file.  
  
Gyn History  
LMP: 2022, Having periods  
Age at Menarche:  
Age at First Pregnancy:  
Age at Menopause:  
Gyn History Comments:  
Sexual Activity: Never; No   
partner data on record  
Contraception: No contraception   
data on record  
  
  
PAST MEDICAL HISTORY  
Diagnosis Date  
NEGATIVE MEDICAL HISTORY  
  
PAST SURGICAL HISTORY  
Procedure Laterality Date  
LAPAROSCOPIC CHOLECYSTECTOMY   
2021  
  
FAMILY HISTORY  
Problem Relation Age of Onset  
other (heart attack) Paternal   
Grandfather  
Hypertension Other  
maternal side  
  
Social History  
  
Tobacco Use  
Smoking status: Passive Smoke   
Exposure - Never Smoker  
Smokeless tobacco: Never Used  
Tobacco comment: smokers outside  
Vaping Use  
Vaping Use: Never used  
Substance Use Topics  
Alcohol use: No  
Drug use: No  
  
No current outpatient   
medications on file.  
  
No current facility-administered   
medications for this visit.  
  
Allergies As of Date: 2022  
Allergen Noted Reaction  
WELCHOL [COLESEVELAM] 2022   
Hives and Itching  
  
Fully Assessed 2022  
  
REVIEW OF SYSTEMS  
Abdomen: No bloating, early   
satiety, indigestion, or   
increased flatulence. No   
abdominal pain, nausea,   
vomiting, diarrhea, or   
constipation.  
Bladder: No dysuria, gross   
hematuria, urinary frequency,   
urinary urgency, or   
incontinence.  
Breast: No breast lumps, nipple   
d/c, overlying skin changes,   
redness or skin retraction.  
Expanded ROS: N/A  
Allergies and current medication   
updated:Yes  
  
EXAM: /76   Wt 154 lb   
(69.9kg)   LMP 2022  
  
  
GENERAL: pleasant,    
female in no apparent distress  
HEENT: Normocephalic  
NECK: Supple  
BREAST: soft, non-tender,   
symmetric, no dominant mass,   
normal nipple-areolar complex,   
no lymphadenopathy and no nipple   
discharge  
CHEST: Normal inspiratory effort  
PELVIC: external genitalia   
normal, normal Bartholin's   
glands, urethra, Pearsall's glands,   
no vulvar lesions, no cervical   
lesions, good vaginal support,   
physiologic discharge present,   
normal appearing perineal body   
and perianal region  
BIMANUAL: uterus normal size,   
shape and consistency, no   
adnexal masses and non-tender  
NEURO: alert and oriented   
x3,exam grossly non-focal  
EXTREMITIES: normal  
  
ASSESSMENT AND PLAN:  
ASSESSMENT/PLAN:  
1. Screen for STD (sexually   
transmitted disease) - ICD9:   
V74.5, ICD10: Z11.3  
- GC/CHLAMYDIA DNA DET  
- MOLLY / TRICHOMONAS   
AMPLIFICATION  
- BACTERIAL VAGINOSIS   
AMPLIFICATION  
  
-Vaginal swabs obtained.  
-Start prophylactic antibiotics.  
-Try new OCP to help with   
irregular bleeding during pill   
pack.  
-Follow up with new concerns.  
  
NAYAN Katz student  
  
TEACHING PROVIDER   
(Physician/PA/APRN) NOTE OF   
PERSONAL INVOLVEMENT IN CARE:  
I have personally seen and   
examined the patient and   
performed the medical   
decision-making components. I   
have reviewed the Advanced   
Practice Registered Nurse (APRN)   
Student's documentation and   
verified the findings in the   
note as written. Any additions   
or changes are noted in   
bold/italics.  
  
Signature: Socorro Zavala  
Date: 2022  
Time: 10:57 AM  
  
Medical Decision Making:  
  
Problems:  
Low: Acute, uncomplicated   
illness or injury  
Data:  
Unique test(s) ordered: 3+  
Risk:  
Low: Low risk from   
testing/treatment  
Moderate: Drug management  
  
Medical Decision Making Level: 4   
- Moderate  
  
  
  
  
Electronically signed by NAYAN Zamora.CNP at 2022   
10:58 AM EDTdocumented in this   
encounter                               [facility]  
   
                                        10- Note     HNO ID: [id number]  
Author: RT Devin(R)  
Service: Radiology  
Author Type: Technologist  
Type: Progress Notes  
Filed: 10/9/2021 4:27 PM  
Note Text:  
Radiology Service Progress Note  
DATE OF SERVICE: 2021  
TIME: 4:27 PM  
PATIENT IDENTITY VERIFICATION   
COMPLETED USING TWO (2) STANDARD  
IDENTIFIERS: Name and Date of   
Birth confirmed by patient   
verbally and  
Name and Date of Birth confirmed   
by identification band.  
FALL SCREENING: Has the patient   
had 2 falls in the last year or   
1 fall  
with injury or currently using   
an Ambulatory Assistive Device   
(Walker,  
Cane, Wheelchair, Crutches,   
etc.)? Emergency Room Patient:   
Screened in ED  
PATIENT GENDER DATA: Female.   
Pregnancy status: Pregnant: No  
Breastfeeding status: NO.  
PATIENT RELEVANT IMPLANT DATA   
REVIEWED: Not Applicable  
ALLERGIES: Reviewed and   
unchanged  
CONTRAST ALLERGY: NO.  
EXAM:  
CT -CONTRAST INDUCED NEPHROPATHY   
RISK FACTORS: Not applicable  
CREATININE:  
Creatinine  
Date Value Ref Range Status  
10/09/2021 0.63 0.58 - 0.96   
mg/dL Final  
2021 0.74 0.58 - 0.96   
mg/dL Final  
Comment:  
Reference ranges for this   
patient's age group have not   
been established.  
These  
reference ranges reflect   
verified or established ranges   
for the adult  
population. Interpret these   
ranges with caution using the   
clinical context  
and  
additional reference resources.  
2020 0.58 0.58 - 0.96   
mg/dL Final  
eGFR-All Other Races  
Date Value Ref Range Status  
10/09/2021 >60 . Final  
Comment:  
eGFR (Estimated GFR) Units of   
measure: mL/min/1.73 meters   
squared  
eGFR is derived from the   
reexpressed MDRD Study equation   
using the  
following  
parameters: serum creatinine,   
age, gender and race. The   
creatinine assay  
has  
been calibrated to be traceable   
to IDMS.  
An eGFR <60 mL/min/1.73m2 for >3   
months is consistent with   
chronic kidney  
disease. Refer to KDOQI   
guidelines for clinical   
interpretation.  
In patients with unstable renal   
function, e.g. those with acute   
kidney  
injury,  
the eGFR may not accurately   
reflect actual GFR.  
eGFR-  
Date Value Ref Range Status  
10/09/2021 >60 Final  
P.O.C.T. RESULTS: POC done: Yes,   
See Lab Tab 2021  
TREATMENT: N/A  
PERIPHERAL IV DATA: Ambulatory:   
Not applicable  
RADIOLOGY DEPARTMENT: CT;   
Exam(s) Completed:   
Abdomen/Pelvis  
SIGNATURE: RT Devin(R)   
PATIENT NAME: Deanna Rowell  
DATE: 2021 MRN:   
[id number]  
TIME: 4:27 PM                           [facility]  
  
  
  
                                                    2021 History of Past i  
llness Narrative   
  
  
  
                                Problem         Noted Date      Resolved Date  
   
                                Childhood overweight, BMI 85-94.9 percentile 2023  
  
documented as of this encounter (statuses as of 2023)  
[facility]- History of Past illness Narrative*   
  
                                Problem         Noted Date      Resolved Date  
   
                                Childhood overweight, BMI 85-94.9 percentile 2023  
  
documented as of this encounter (statuses as of 2023)  
[facility]- History of Past illness Narrative*   
  
                                Problem         Noted Date      Resolved Date  
   
                                Childhood overweight, BMI 85-94.9 percentile 2023  
  
documented as of this encounter (statuses as of 2023)  
[facility]- History of Past illness Narrative*   
  
                                Problem         Noted Date      Resolved Date  
   
                                Childhood overweight, BMI 85-94.9 percentile 2023  
  
documented as of this encounter (statuses as of 2023)  
[address]20-2021 History of Past illness Narrative*   
  
                                Problem         Noted Date      Resolved Date  
   
                                Childhood overweight, BMI 85-94.9 percentile 2023  
  
documented as of this encounter (statuses as of 2023)  
[address]20-2021 History of Past illness Narrative*   
  
                                Problem         Noted Date      Resolved Date  
   
                                Childhood overweight, BMI 85-94.9 percentile 2023  
  
documented as of this encounter (statuses as of 2023)  
[address]20-2021 History of Past illness Narrative*   
  
                          Problem      Noted Date   Diagnosed Date Resolved Date  
   
                          Childhood overweight, BMI 85-94.9 percentile 2023  
  
documented as of this encounter (statuses as of 2023)  
[address]20-2021 History of Past illness Narrative*   
  
                          Problem      Noted Date   Diagnosed Date Resolved Date  
   
                          Childhood overweight, BMI 85-94.9 percentile 2023  
  
documented as of this encounter (statuses as of 2023)  
[address]20-2021 History of Past illness Narrative*   
  
                          Problem      Noted Date   Diagnosed Date Resolved Date  
   
                          Childhood overweight, BMI 85-94.9 percentile 2023  
  
documented as of this encounter (statuses as of 2023)  
[address]20-2021 History of Past illness Narrative*   
  
                          Problem      Noted Date   Diagnosed Date Resolved Date  
   
                          Childhood overweight, BMI 85-94.9 percentile 2023  
  
documented as of this encounter (statuses as of 10/07/2023)  
[address]20-2021 History of Past illness Narrative*   
  
                          Problem      Noted Date   Diagnosed Date Resolved Date  
   
                          Childhood overweight, BMI 85-94.9 percentile 2023  
  
documented as of this encounter (statuses as of 10/12/2023)  
[address]20-2021 History of Past illness Narrative*   
  
                          Problem      Noted Date   Diagnosed Date Resolved Date  
   
                          Childhood overweight, BMI 85-94.9 percentile   
1                2023  
  
documented as of this encounter (statuses as of 10/13/2023)  
[address]20-2021 History of Past illness Narrative*   
  
                          Problem      Noted Date   Diagnosed Date Resolved Date  
   
                          Childhood overweight, BMI 85-94.9 percentile   
1                2023  
  
documented as of this encounter (statuses as of 10/30/2023)  
[facility]- History of Past illness Narrative*   
  
                          Problem      Noted Date   Diagnosed Date Resolved Date  
   
                          Childhood overweight, BMI 85-94.9 percentile   
1                2023  
  
documented as of this encounter (statuses as of 2023)  
[facility]- History of Past illness Narrative*   
  
                          Problem      Noted Date   Diagnosed Date Resolved Date  
   
                          Childhood overweight, BMI 85-94.9 percentile   
1                2023  
  
documented as of this encounter (statuses as of 2023)  
[facility]- History of Past illness Narrative*   
  
                          Problem      Noted Date   Diagnosed Date Resolved Date  
   
                          Childhood overweight, BMI 85-94.9 percentile   
1                2023  
  
documented as of this encounter (statuses as of 2023)  
[facility]- History of Past illness Narrative*   
  
                          Problem      Noted Date   Diagnosed Date Resolved Date  
   
                          Childhood overweight, BMI 85-94.9 percentile   
1                2023  
  
documented as of this encounter (statuses as of 2024)  
[facility]- History of Past illness Narrative*   
  
                          Problem      Noted Date   Diagnosed Date Resolved Date  
   
                          Childhood overweight, BMI 85-94.9 percentile   
1                2023  
  
documented as of this encounter (statuses as of 2024)  
[facility]- History of Past illness Narrative*   
  
                          Problem      Noted Date   Diagnosed Date Resolved Date  
   
                          Childhood overweight, BMI 85-94.9 percentile   
1                2023  
  
documented as of this encounter (statuses as of 2024)  
[facility]- History of Past illness Narrative*   
  
                          Problem      Noted Date   Diagnosed Date Resolved Date  
   
                          Childhood overweight, BMI 85-94.9 percentile   
1                2023  
  
documented as of this encounter (statuses as of 2024)  
[facility]- History of Past illness Narrative*   
  
                          Problem      Noted Date   Diagnosed Date Resolved Date  
   
                          Childhood overweight, BMI 85-94.9 percentile   
1                2023  
  
documented as of this encounter (statuses as of 2024)  
[facility]- History of Past illness Narrative*   
  
                          Problem      Noted Date   Diagnosed Date Resolved Date  
   
                          Childhood overweight, BMI 85-94.9 percentile   
1                2023  
  
documented as of this encounter (statuses as of 2024)  
[facility] note*   
  
                                                    Diagnosis  
   
                                                      
  
  
Screen for STD (sexually transmitted disease)- Primary  
  
  
Screening examination for venereal disease  
  
documented in this encounter  
[facility] note*   
  
                                                    Diagnosis  
   
                                                      
  
  
Urinary frequency- Primary  
   
                                                      
  
  
Vaginal discharge  
  
  
Leukorrhea, not specified as infective  
  
documented in this encounter  
[facility]   
  
                                                    Diagnosis  
   
                                                      
  
  
Finger infection- Primary  
  
  
Unspecified local infection of skin and subcutaneous tissue  
  
documented in this encounter  
[facility]   
  
                                                    Diagnosis  
   
                                                      
  
  
Avulsion of fingernail, subsequent encounter- Primary  
  
documented in this encounter  
[facility]   
  
                                                    Diagnosis  
   
                                                      
  
  
Vulvar abscess- Primary  
  
  
Other abscess of vulva  
  
documented in this encounter  
[facility] note*   
  
                                                    Diagnosis  
   
                                                      
  
  
Chest pressure- Primary  
  
  
Other chest pain  
  
documented in this encounter  
[facility]   
  
                                                    Diagnosis  
   
                                                      
  
  
Vomiting without nausea, unspecified vomiting type- Primary  
  
documented in this encounter  
[facility]   
  
                                                    Diagnosis  
   
                                                      
  
  
Nausea and vomiting, unspecified vomiting type- Primary  
   
                                                      
  
  
Headache, unspecified headache type  
  
documented in this encounter  
[facility]   
  
                                                    Diagnosis  
   
                                                      
  
  
Generalized abdominal pain- Primary  
  
  
Abdominal pain, generalized  
   
                                                      
  
  
Vomiting without nausea, unspecified vomiting type  
   
                                                      
  
  
Abnormal CT of the abdomen  
  
  
Nonspecific (abnormal) findings on radiological and other examination of 
abdominal   
area, including retroperitoneum  
   
                                                      
  
  
Anxiety  
  
  
Anxiety state, unspecified  
   
                                                      
  
  
Chronic diarrhea  
  
  
Diarrhea  
  
documented in this encounter  
[facility]   
  
                                                    Diagnosis  
   
                                                      
  
  
Otitis media with effusion, right- Primary  
   
                                                      
  
  
Impacted cerumen of right ear  
  
  
Impacted cerumen  
  
documented in this encounter  
[facility]   
  
                                                    Diagnosis  
   
                                                      
  
  
Vomiting without nausea, unspecified vomiting type  
   
                                                      
  
  
Generalized abdominal pain  
  
  
Abdominal pain, generalized  
  
documented in this encounter  
[facility]   
  
                                                    Diagnosis  
   
                                                      
  
  
Anxiety- Primary  
  
  
Anxiety state, unspecified  
  
documented in this encounter  
[facility]   
  
                                                    Diagnosis  
   
                                                      
  
  
Strep pharyngitis- Primary  
  
  
Streptococcal sore throat  
  
documented in this encounter  
[facility]   
  
                                                    Diagnosis  
   
                                                      
  
  
Plaque, tongue- Primary  
   
                                                      
  
  
Strep throat  
  
  
Streptococcal sore throat  
   
                                                      
  
  
Rash  
  
  
Rash and other nonspecific skin eruption  
  
documented in this encounter  
[facility]   
  
                                                    Diagnosis  
   
                                                      
  
  
Irritable bowel syndrome with diarrhea- Primary  
  
  
Irritable bowel syndrome  
   
                                                      
  
  
Nausea  
  
  
Nausea alone  
   
                                                      
  
  
Dairy product intolerance  
  
  
Other specified intestinal malabsorption  
   
                                                      
  
  
Generalized anxiety disorder with panic attacks  
  
documented in this encounter  
[facility]   
  
                                                    Diagnosis  
   
                                                      
  
  
Strep pharyngitis- Primary  
  
  
Streptococcal sore throat  
   
                                                      
  
  
Sore throat  
  
  
Acute pharyngitis  
  
documented in this encounter  
[facility]   
  
                                                    Diagnosis  
   
                                                      
  
  
Sore throat- Primary  
  
  
Acute pharyngitis  
  
documented in this encounter  
[facility]   
  
                                                    Diagnosis  
   
                                                      
  
  
Pain- Primary  
  
  
Generalized pain  
  
documented in this encounter  
[facility] note*   
  
                                                    Diagnosis  
   
                                                      
  
  
Biceps tendonitis on left- Primary  
  
  
Bicipital tenosynovitis  
  
documented in this encounter  
[facility]   
  
                                                    Diagnosis  
   
                                                      
  
  
Burning with urination- Primary  
  
  
Dysuria  
  
documented in this encounter  
[facility]   
  
                                                    Diagnosis  
   
                                                      
  
  
Recurrent UTI- Primary  
  
  
Urinary tract infection, site not specified  
  
documented in this encounter  
[facility]   
  
                                                    Diagnosis  
   
                                                      
  
  
History of concussion- Primary  
  
  
Personal history of traumatic brain injury  
  
documented in this encounter  
[facility]   
  
                                                    Diagnosis  
   
                                                      
  
  
Procedure not carried out- Primary  
  
  
Procedure not carried out for other reasons  
  
documented in this encounter  
[facility] note*   
  
                                                    Diagnosis  
   
                                                      
  
  
Injury of head, subsequent encounter- Primary  
   
                                                      
  
  
History of concussion  
  
  
Personal history of traumatic brain injury  
   
                                                      
  
  
Cervical pain  
  
  
Cervicalgia  
   
                                                      
  
  
Dizziness  
  
  
Dizziness and giddiness  
   
                                                      
  
  
Subjective visual disturbance  
  
  
Subjective visual disturbance, unspecified  
  
documented in this encounter  
[facility]   
  
                                                    Diagnosis  
   
                                                      
  
  
History of concussion  
  
  
Personal history of traumatic brain injury  
   
                                                      
  
  
Injury of head, subsequent encounter  
  
documented in this encounter  
[facility]   
  
                                                    Diagnosis  
   
                                                      
  
  
History of concussion  
  
  
Personal history of traumatic brain injury  
   
                                                      
  
  
Injury of head, subsequent encounter  
  
documented in this encounter  
[facility]   
  
                                                    Diagnosis  
   
                                                      
  
  
Injury of head, subsequent encounter- Primary  
   
                                                      
  
  
History of concussion  
  
  
Personal history of traumatic brain injury  
   
                                                      
  
  
Cervical pain  
  
  
Cervicalgia  
   
                                                      
  
  
Dizziness  
  
  
Dizziness and giddiness  
   
                                                      
  
  
Subjective visual disturbance  
  
  
Subjective visual disturbance, unspecified  
  
documented in this encounter  
[facility] note*   
  
                                                    Diagnosis  
   
                                                      
  
  
Pelvic pain in female  
  
  
Unspecified symptom associated with female genital organs  
  
documented in this encounter  
[facility]   
  
                                                    Diagnosis  
   
                                                      
  
  
Pelvic pain in female- Primary  
  
  
Unspecified symptom associated with female genital organs  
  
documented in this encounter  
[facility]   
  
                                                    Diagnosis  
   
                                                      
  
  
Generalized abdominal pain- Primary  
  
  
Abdominal pain, generalized  
  
documented in this encounter  
[facility]   
  
                                                    Diagnosis  
   
                                                      
  
  
Folliculitis- Primary  
  
  
Other specified disease of hair and hair follicles  
   
                                                      
  
  
Dysuria  
  
[facility] this encounter  
[facility]   
  
                                                    Diagn[facility]  
   
                                                      
  
  
Migraine with aura and without status migrainosus, not intractable  
  
  
Migraine with aura, without mention of intractable migraine without mention of 
status   
migrainosus  
   
                                                      
  
  
New onset headache  
  
  
Headache  
  
documented in this encounter  
[facility]   
  
                                                    Diagnosis  
   
                                                      
  
  
Pain  
  
  
Generalized pain  
  
documented in this encounter  
[facility]   
  
                                                    Diagnosis  
   
                                                      
  
  
Migraine with aura and without status migrainosus, not intractable- Primary  
  
  
Migraine with aura, without mention of intractable migraine without mention of 
status   
migrainosus  
   
                                                      
  
  
New onset headache  
  
  
Headache  
  
documented in this encounter  
[facility] Diagnostic Procedure Only 
  (Routine) - Pending Review  
  
                          Specialty    Diagnoses / Procedures Referred By Contac  
t Referred To Contact  
   
                                        [facility]            
  
  
Diagnoses  
  
  
Vomiting without nausea,   
unspecified vomiting type  
  
  
Generalized abdominal pain  
  
  
  
Procedures  
  
  
XR GI SMALL BOWEL FOLLOW-THRU  
  
  
RADIOLOGIC SMALL INTESTINE   
FOLLOW-THROUGH STUDY                      
  
  
Bijan Reeves MD  
  
  
 [facility]  
  
  
[address] [place] [address]  
  
  
Phone: [phone]  
  
  
Fax: [phone]                         
  
  
[facility]  
  
  
  
                          Referral ID  Status       Reason       Start   
Date                                    Expiration   
Date                                    Visits   
Requested                               Visits   
Authorized  
   
                                        [id number]            Pending   
Review                                    
  
  
Auto-Generat  
ed Referral     2023       1               1  
  
  
  
  
Electronically signed by Bijan Reeves MD at 2023 10:17 AM EST  
  
  
* Diagnostic Procedure Only (Routine) - Pending Review  
  
                          Specialty    Diagnoses / Procedures Referred By Contac  
t Referred To Contact  
   
                                        [facility]            
  
  
Diagnoses  
  
  
Vomiting without nausea,   
unspecified vomiting type  
  
  
Generalized abdominal pain  
  
  
  
Procedures  
  
  
XR UPPER GI SINGLE CONTRAST  
  
  
RADIOLOGIC EXAM UPR GI TRC   
SINGLE CONTRAST STUDY                     
  
  
Bijan Reeves MD  
  
  
 [address]  
  
  
[address] [place] [address]  
  
  
Phone: [phone]  
  
  
Fax: [phone]                         
  
  
[facility]  
  
  
  
                          Referral ID  Status       Reason       Start   
Date                                    Expiration   
Date                                    Visits   
Requested                               Visits   
Authorized  
   
                                        [id number]            Pending   
Review                                    
  
  
Auto-Generat  
ed Referral     2023       1               1  
  
  
  
  
Electronically signed by Bijan Reeves MD at 2023 10:17 AM EST  
  
  
* Outpatient Procedure (Routine) - Pending Review  
  
                          Specialty    Diagnoses / Procedures Referred By Contac  
t Referred To Contact  
   
                                                    [facility]                                 
  
  
Diagnoses  
  
  
Vomiting without   
nausea, unspecified   
vomiting type  
  
  
Generalized abdominal   
pain  
  
  
  
Procedures  
  
  
[facility]   
[facility] VAS LAB  
  
  
DUP-SCAN ARTL DIONISIO   
ABDL/PEL/SCROT&/RPR   
ORGN COM                                  
  
  
Bijan Reeves MD  
  
  
 [address]  
  
  
[address]  
  
  
[place] [address]  
  
  
Phone: [phone]  
  
  
Fax: [phone]                         
  
  
[facility]   
[place]  
  
  
[address] [place] [address]  
  
  
  
                          Referral ID  Status       Reason       Start   
Date                                    Expiration   
Date                                    Visits   
Requested                               Visits   
Authorized  
   
                                        [id number]            Pending   
Review                                    
  
  
Auto-Generat  
ed Referral     2023       1               1  
  
  
  
  
Electronically signed by Bijan Reeves MD at 2023 10:17 AM EST  
  
  
* Diagnostic Procedure Only (Routine) - Authorized  
  
                          Specialty    Diagnoses / Procedures Referred By Contac  
t Referred To Contact  
   
                                        [facility]            
  
  
Diagnoses  
  
  
Abnormal CT of the abdomen  
  
  
  
Procedures  
  
  
US FEMALE PELVIS TRANSVAG  
  
  
US TRANSVAGINAL                           
  
  
Bijan Reeves MD  
  
  
 [address]  
  
  
[address]  
  
  
[place] [id number]  
  
  
Phone: [phone]  
  
  
Fax: [phone]                         
  
  
[facility]  
  
  
  
                          Referral ID  Status       Reason       Start   
Date                                    Expiration   
Date                                    Visits   
Requested                               Visits   
Authorized  
   
                                [id number]        Authorized        
  
  
Auto-Generat  
ed Referral     2023       1               1  
  
  
  
  
Electronically signed by Bijan Reeves MD at 2023 10:17 AM EST  
  
  
[facility] for referral (narrative)* Diagnostic Procedure Only 
  (Routine) - Closed  
  
                          Specialty    Diagnoses / Procedures Referred By Contac  
t Referred To Contact  
   
                                        [facility]            
  
  
Diagnoses  
  
  
Vomiting without nausea,   
unspecified vomiting type  
  
  
Generalized abdominal pain  
  
  
  
Procedures  
  
  
XR GI SMALL BOWEL FOLLOW-THRU  
  
  
RADIOLOGIC SMALL INTESTINE   
FOLLOW-THROUGH STUDY                      
  
  
Bijan Reeves MD  
  
  
 [address]  
  
  
[address]  
  
  
[place]   
[address]  
  
  
Phone: [phone]  
  
  
Fax: [phone]                         
  
  
[facility]  
  
  
  
                    Referral ID Status    Reason    Start Date Expiration Date V  
isits   
Requested                               Visits   
Authorized  
   
                                [id number]        Closed            
  
  
Auto-Generate  
d Referral      2023       1               1  
  
  
  
  
Electronically signed by Bijan Reeves MD at 2023 9:17 AM EST  
  
  
* Diagnostic Procedure Only (Routine) - Closed  
  
                          Specialty    Diagnoses / Procedures Referred By Contac  
t Referred To Contact  
   
                                        [facility]            
  
  
Diagnoses  
  
  
Vomiting without nausea,   
unspecified vomiting type  
  
  
Generalized abdominal pain  
  
  
  
Procedures  
  
  
XR UPPER GI SINGLE CONTRAST  
  
  
RADIOLOGIC EXAM UPR GI TRC   
SINGLE CONTRAST STUDY                     
  
  
Bijan Reeves MD  
  
  
 [address]  
  
  
[address]  
  
  
[place]   
[address]  
  
  
Phone: [phone]  
  
  
Fax: [phone]                         
  
  
[facility]  
  
  
  
                    Referral ID Status    Reason    Start Date Expiration Date V  
isits   
Requested                               Visits   
Authorized  
   
                                [id number]        Closed            
  
  
Auto-Generate  
d Referral      2023      1               1  
  
  
  
  
Electronically signed by Bijan Reeves MD at 2023 9:17 AM EST  
  
  
[facility] for referral (narrative)* Diagnostic Procedure Only 
  (Routine) - Pending Review  
  
                          Specialty    Diagnoses / Procedures Referred By Contac  
t Referred To Contact  
   
                                                    [facility]   
IMAGING                                   
  
  
Diagnoses  
  
  
Nausea  
  
  
  
Procedures  
  
  
NM GASTRIC EMPTYING   
SOLID  
  
  
GASTRIC EMPTYING STUDY                    
  
  
Bijan Reeves MD  
  
  
 [facility]  
  
  
[address]  
  
  
[place] [address]  
  
  
Phone: [phone]  
  
  
Fax: [phone]                         
  
  
Molecular &   
Functional Imaging  
  
  
[address] [place] [address]  
  
  
Phone: [phone]  
  
  
  
                          Referral ID  Status       Reason       Start   
Date                                    Expiration   
Date                                    Visits   
Requested                               Visits   
Authorized  
   
                                        [id number]            Pending   
Review                                    
  
  
Auto-Generat  
ed Referral     2023       1               1  
  
  
  
  
Electronically signed by Bijan Reeves MD at 2023 11:22 AM EDT  
  
  
[facility] for referral (narrative)* Diagnostic Procedure Only 
  (Routine) - Closed  
  
                          Specialty    Diagnoses / Procedures Referred By Contac  
t Referred To Contact  
   
                                        [facility]            
  
  
Diagnoses  
  
  
Pelvic pain in female  
  
  
  
Procedures  
  
  
US FEMALE PELVIS TRANSVAG  
  
  
US TRANSVAGINAL                           
  
  
Socorro Zavala APRN.CNP  
  
  
[address] [place] [address]  
  
  
Phone: [phone]  
  
  
Fax: [phone]                         
  
  
[facility]  
  
  
[place] [address]  
  
  
  
                    Referral ID Status    Reason    Start Date Expiration Date V  
isits   
Requested                               Visits   
Authorized  
   
                                [id number]        Closed            
  
  
Auto-Generate  
d Referral      2024       1               1  
  
  
  
  
Electronically signed by Socorro ENRIQUEZCNP at 2024 8:51 AM EST  
  
  
[facility] for referral (narrative)* Diagnostic Procedure Only 
  (Urgent) - Closed  
  
                          Specialty    Diagnoses / Procedures Referred By Contac  
t Referred To Contact  
   
                                        [facility]            
  
  
Diagnoses  
  
  
Pain  
  
  
  
Procedures  
  
  
XR SHOULDER GENERAL 3V OR   
MORE AP/TRUE AP/OTHER LEFT  
  
  
RADEX SHOULDER COMPLETE   
MINIMUM 2 VIEWS                           
  
  
Michelle Velasquez APRN.CNP  
  
  
[address] [place] [address]  
  
  
Phone: [phone]  
  
  
Fax: [phone]                         
  
  
[facility]  
  
  
[place] [address]  
  
  
  
                    Referral ID Status    Reason    Start Date Expiration Date V  
isits   
Requested                               Visits   
Authorized  
   
                                [id number]        Closed            
  
  
Auto-Generate  
d Referral      2023       1               1  
  
  
  
  
Electronically signed by Michelle Velasquez APRN.CNP at 2023 12:18 PM EDT  
  
  
[facility] for visit Narrative* Diagnostic Procedure Only (Routine) 
  - Closed  
  
                          Specialty    Diagnoses / Procedures Referred By Contac  
t Referred To Contact  
   
                                        [facility]            
  
  
Diagnoses  
  
  
Pelvic pain in female  
  
  
  
Procedures  
  
  
US FEMALE PELVIS TRANSVAG  
  
  
US TRANSVAGINAL                           
  
  
Socorro Zavala APRN.CNP  
  
  
[address] [address]  
  
  
Phone: [phone]  
  
  
Fax: [phone]                         
  
  
[facility]  
  
  
[place] [address]  
  
  
  
                    Referral ID Status    Reason    Start Date Expiration Date V  
isits   
Requested                               Visits   
Authorized  
   
                                [id number]        Closed            
  
  
Auto-Generate  
d Referral      2024       1               1  
  
  
  
[facility] for visit Narrative* Diagnostic Procedure Only (Urgent) -
   Closed  
  
                          Specialty    Diagnoses / Procedures Referred By Contac  
t Referred To Contact  
   
                                        [facility]            
  
  
Diagnoses  
  
  
Pain  
  
  
  
Procedures  
  
  
XR SHOULDER GENERAL 3V OR   
MORE AP/TRUE AP/OTHER LEFT  
  
  
RADEX SHOULDER COMPLETE   
MINIMUM 2 VIEWS                           
  
  
Michelle Velasquez APRN.CNP  
  
  
[address] [place] [address]  
  
  
Phone: [phone]  
  
  
Fax: [phone]                         
  
  
[facility]  
  
  
[place] [address]  
  
  
  
                    Referral ID Status    Reason    Start Date Expiration Date V  
isits   
Requested                               Visits   
Authorized  
   
                                [id number]        Closed            
  
  
Auto-Generate  
d Referral      2023       1               1  
  
  
  
[facility]  
  
Summary Purpose  
  
  
                                                      
  
  
  
Family History  
No Family History Records FoundNo Family History Records FoundNo Family History 
Records FoundNo Family History Records FoundNo Family History Records FoundNo 
Family History Records Found  
  
Advance Directives  
                                Documents on File  
  
                          Type         Date Recorded Patient Representative Expl  
anation  
   
                          Advance Directive(s) 10/9/2021 2:55 PM                
   
                          Advance Directive(s) 3/11/2021 9:38 AM                
   
                          Advance Directive(s) 3/2/2021 10:56 AM                
  
  
  
Reason for Referral  
  
  
                          Specialty    Diagnoses / Procedures Referred By Contac  
t Referred To Contact  
   
                                        Gastroenterology      
  
  
Diagnoses  
  
  
Vomiting without nausea,   
unspecified vomiting type  
  
  
  
Procedures  
  
  
CONSULT TO   
GASTROENTEROLOGY  
  
  
OFFICE/OUTPATIENT [facility] 60-74 MINUTES                    
  
  
Neto Talbot APRN.CNP  
  
  
[address]   
[address]  
  
  
[place] [address]  
  
  
Phone: [phone]  
  
  
Fax: [phone]                         
  
  
  
  
  
                          Referral ID  Status       Reason       Start   
Date                                    Expiration   
Date                                    Visits   
Requested                               Visits   
Authorized  
   
                                [id number]        Authorized        
  
  
PCP Requested   
Referral                                2023          1                   1  
  
  
  
                          Specialty    Diagnoses / Procedures Referred By Contac  
t Referred To Contact  
   
                                        Gastroenterology      
  
  
Diagnoses  
  
  
Nausea and vomiting,   
unspecified vomiting type  
  
  
  
Procedures  
  
  
CONSULT TO   
GASTROENTEROLOGY  
  
  
OFFICE/OUTPATIENT [facility] 60-74 MINUTES                         
  
  
Delmis Khanna APRN.CNP  
  
  
[address] [place] [address]  
  
  
Phone: [phone]  
  
  
Fax: [phone]                         
  
  
  
  
  
                          Referral ID  Status       Reason       Start   
Date                                    Expiration   
Date                                    Visits   
Requested                               Visits   
Authorized  
   
                                [id number]        Authorized        
  
  
PCP Requested   
Referral        2023        1               1  
  
  
  
                          Specialty    Diagnoses / Procedures Referred By Contac  
t Referred To Contact  
   
                                        Orthopedics           
  
  
Diagnoses  
  
  
Pain  
  
  
  
Procedures  
  
  
CONSULT TO ORTHOPAEDICS  
  
  
OFFICE/OUTPATIENT [facility] 60-74 MINUTES                         
  
  
Michelle Velasquez APRN.CNP  
  
  
[address] [place] [address]  
  
  
Phone: [phone]  
  
  
Fax: [phone]                         
  
  
  
  
  
                          Referral ID  Status       Reason       Start   
Date                                    Expiration   
Date                                    Visits   
Requested                               Visits   
Authorized  
   
                                [id number]        Authorized        
  
  
PCP Requested   
Referral        2023       1               1  
  
  
  
                          Specialty    Diagnoses / Procedures Referred By Contac  
t Referred To Contact  
   
                                        [facility]            
  
  
Diagnoses  
  
  
Pain  
  
  
  
Procedures  
  
  
XR SHOULDER GENERAL 3V OR   
MORE AP/TRUE AP/OTHER LEFT  
  
  
RADEX SHOULDER COMPLETE   
MINIMUM 2 VIEWS                           
  
  
Michelle Velasquez APRN.CNP  
  
  
[address] [place] [address]  
  
  
Phone: [phone]  
  
  
Fax: [phone]                         
  
  
[facility]  
  
  
  
                    Referral ID Status    Reason    Start Date Expiration Date V  
isits   
Requested                               Visits   
Authorized  
   
                                [id number]        Closed            
  
  
Auto-Generate  
d Referral      2023       1               1  
  
  
  
                          Specialty    Diagnoses / Procedures Referred By Contac  
t Referred To Contact  
   
                                        Pediatric Neurology   
  
  
Diagnoses  
  
  
History of concussion  
  
  
  
Procedures  
  
  
CONSULT TO PEDS   
NEUROLOGY  
  
  
OFFICE/OUTPATIENT [facility] 60-74 MINUTES                    
  
  
Neto Talbot APRN.CNP  
  
  
[address],   
[address]  
  
  
[place] [address]  
  
  
Phone: [phone]  
  
  
Fax: [phone]                         
  
  
  
  
  
                          Referral ID  Status       Reason       Start   
Date                                    Expiration   
Date                                    Visits   
Requested                               Visits   
Authorized  
   
                                [id number]        Authorized        
  
  
PCP Requested   
Referral        10/5/2023       10/4/2024       1               1  
  
  
  
                          Specialty    Diagnoses / Procedures Referred By Contac  
t Referred To Contact  
   
                                                    [facility]                               
  
  
Diagnoses  
  
  
History of concussion  
  
  
Cervical pain  
  
  
Dizziness  
  
  
  
Procedures  
  
  
CONSULT TO PHYSICAL   
THERAPY  
  
  
PHYSICAL THERAPY   
EVALUATION HIGH COMPLEX   
45 MINS                                   
  
  
Amalia Marquez,   
NAYAN.CNP  
  
  
[address] [place] [address]  
  
  
Phone: [phone]  
  
  
Fax: [phone]                         
  
  
[facility]  
  
  
[address] [place] [address]  
  
  
  
                          Referral ID  Status       Reason       Start   
Date                                    Expiration   
Date                                    Visits   
Requested                               Visits   
Authorized  
   
                                        [id number]            Pending   
Review                                    
  
  
Auto-Generat  
ed Referral                             10/13/202  
3                   10/12/2024          1                   1  
  
  
  
                          Specialty    Diagnoses / Procedures Referred By Contac  
t Referred To Contact  
   
                                        CT IMAGING            
  
  
Diagnoses  
  
  
Injury of head, subsequent   
encounter  
  
  
  
Procedures  
  
  
CTA NECK W IVCON  
  
  
CT ANGIOGRAPHY NECK   
W/CONTRAST/NONCONTRAST                    
  
  
Amalia Marquez APRN.CNP  
  
  
[address] [place] [address]  
  
  
Phone: [phone]  
  
  
Fax: [phone]                         
  
  
[facility]  
  
  
[place] [address]  
  
  
  
                          Referral ID  Status       Reason       Start   
Date                                    Expiration   
Date                                    Visits   
Requested                               Visits   
Authorized  
   
                                [id number]        Authorized        
  
  
Auto-Generat  
ed Referral                             10/13/202  
3                   2023          1                   1  
  
  
  
                          Specialty    Diagnoses / Procedures Referred By Contac  
t Referred To Contact  
   
                                        CT IMAGING            
  
  
Diagnoses  
  
  
History of concussion  
  
  
Injury of head, subsequent   
encounter  
  
  
  
Procedures  
  
  
CTA HEAD W IVCON  
  
  
CT ANGIOGRAPHY HEAD   
W/CONTRAST/NONCONTRAST                    
  
  
Amalia Marquez,   
NAYAN.CNP  
  
  
[address] [place] [address]  
  
  
Phone: [phone]  
  
  
Fax: [phone]                         
  
  
[facility]  
  
  
[place] [address]  
  
  
  
                          Referral ID  Status       Reason       Start   
Date                                    Expiration   
Date                                    Visits   
Requested                               Visits   
Authorized  
   
                                [id number]        Authorized        
  
  
Auto-Generat  
ed Referral                             10/13/202  
3                   2024          1                   1  
  
  
  
                          Specialty    Diagnoses / Procedures Referred By Contac  
t Referred To Contact  
   
                                        CT IMAGING            
  
  
Diagnoses  
  
  
Injury of head, subsequent   
encounter  
  
  
  
Procedures  
  
  
CT BRAIN WO IVCON  
  
  
CT HEAD/BRAIN W/O CONTRAST   
MATERIAL                                  
  
  
Amalia Marquez,   
NAYAN.CNP  
  
  
[address] [place] [address]  
  
  
Phone: [phone]  
  
  
Fax: [phone]                         
  
  
[facility]  
  
  
[place] [address]  
  
  
  
                          Referral ID  Status       Reason       Start   
Date                                    Expiration   
Date                                    Visits   
Requested                               Visits   
Authorized  
   
                                [id number]        Authorized        
  
  
Auto-Generat  
ed Referral                             10/13/202  
3                   2023          1                   1  
  
  
  
                          Specialty    Diagnoses / Procedures Referred By Contac  
t Referred To Contact  
   
                                        CT IMAGING            
  
  
Diagnoses  
  
  
History of concussion  
  
  
Injury of head, subsequent   
encounter  
  
  
  
Procedures  
  
  
CT CERVICAL SPINE WO IVCON  
  
  
CT CERVICAL SPINE W/O   
CONTRAST MATERIAL                         
  
  
Amalia Marquez APRN.CNP  
  
  
[address] [place] [address]  
  
  
Phone: [phone]  
  
  
Fax: [phone]                         
  
  
[facility]  
  
  
[facility]  
  
  
  
                          Referral ID  Status       Reason       Start   
Date                                    Expiration   
Date                                    Visits   
Requested                               Visits   
Authorized  
   
                                [id number]        Authorized        
  
  
Auto-Generat  
ed Referral                             10/13/202  
3                   2023          1                   1  
  
  
  
                    Referral ID Status    Reason    Start Date Expiration Date V  
isits   
Requested                               Visits   
Authorized  
   
                                [id number]        Closed            
  
  
Auto-Generate  
d Referral      10/13/2023      2023      1               1  
  
  
  
                    Referral ID Status    Reason    Start Date Expiration Date V  
isits   
Requested                               Visits   
Authorized  
   
                                [id number]        Closed            
  
  
Auto-Generate  
d Referral      10/13/2023      2023      1               1  
  
  
  
                    Referral ID Status    Reason    Start Date Expiration Date V  
isits   
Requested                               Visits   
Authorized  
   
                                [id number]        Closed            
  
  
Auto-Generate  
d Referral      10/13/2023      2023      1               1  
  
  
  
                    Referral ID Status    Reason    Start Date Expiration Date V  
isits   
Requested                               Visits   
Authorized  
   
                                [id number]        Closed            
  
  
Auto-Generate  
d Referral      10/13/2023      2024      1               1  
  
  
  
                          Specialty    Diagnoses / Procedures Referred By Contac  
t Referred To Contact  
   
                                        MR IMAGING            
  
  
Diagnoses  
  
  
Migraine with aura and   
without status migrainosus,   
not intractable  
  
  
New onset headache  
  
  
  
Procedures  
  
  
MRI BRAIN WO/W IVCON  
  
  
MRI BRAIN BRAIN STEM W/O   
W/CONTRAST MATERIAL                       
  
  
Amalia Marquez,   
NAYAN.CNP  
  
  
[address] [place] [address]  
  
  
Phone: [phone]  
  
  
Fax: [phone]                         
  
  
[facility]  
  
  
[facility]  
  
  
  
                    Referral ID Status    Reason    Start Date Expiration Date V  
isits   
Requested                               Visits   
Authorized  
   
                                [id number]        Closed            
  
  
Auto-Generate  
d Referral      2024       1               1  
  
  
  
                          Referral ID  Status       Reason       Start   
Date                                    Expiration   
Date                                    Visits   
Requested                               Visits   
Authorized  
   
                                        [id number]            Pending   
Review                                    
  
  
Auto-Generat  
ed Referral     2024       9/15/2025       1               1  
  
  
  
Health Concerns  
  
  
                          Infection    Onset Date   Last Indicated Resolved Time  
   
                          C. difficile 03/10/2023   03/10/2023     
  
  
  
Medications Administered Section  
     Inactive Administered Medications - up to 3 most recent administrations  
  
                    Medication Order MAR Action Action Date Dose      Rate        
Site  
   
                                                      
  
  
lidocaine (PF) 10 mg/mL (1   
%) 4 mL injection   
(XYLOCAINE)  
  
  
4 mL, Injection - FOR   
ORTHO USE ONLY, ONE TIME   
INJECTION, 1 dose,   
Starting on 23 at   
1329, Until 23 at   
1329         Given        2023 1:29 PM EDT 4 mL                      [facility]  
michelle, Left  
   
                                                               
   
                                                      
  
  
triamcinolone acetonide 40   
mg injection (KeNALog 40)  
  
  
40 mg, Injection - FOR   
ORTHO USE ONLY, ONE TIME   
INJECTION, 1 dose,   
Starting on 23 at   
1329, Until 23 at   
1329         Given        2023 1:29 PM EDT 40 mg                     [facility]  
michelle, Left  
  
  
  
Chief Complaint  
* Patient is been seen today for: RIGHT HAND PINKY FINGER CRUSHED IN A DOOR  
* Onset: 7/15/23  
* Date of injury: 7/15/23  
* Date of surgery: NA  
* Improved: NO  
* Pain: 3/10  
* Pain Meds taken: NOTHING  
* Ice/Heat applied: ICE  
* Brace worn: ACE WRAPS  
* Last Xray: 23  
* Referred by:  ER  
* Patient is been seen today for: RIGHT HAND PINKY FINGER CRUSHED IN A DOOR  
* Onset: 7/15/23  
* Date of injury: 7/15/23  
* Date of surgery: NA  
* Improved: NO  
* Pain: 3/10  
* Pain Meds taken: NOTHING  
* Ice/Heat applied: ICE  
* Brace worn: ACE WRAPS  
* Last Xray: 23  
* Referred by:  ER  
* Patient is been seen today for: F/U RIGHT HAND PINKY FINGER CRUSHED IN A DOOR  
* Onset: 7/15/23  
* Date of injury: 7/15/23  
* Date of surgery: NA  
* Improved: NO, FINGER WILL SWELL ANYTIME IT IS UNWRAPPED.  
* Pain: 0/10  
* Pain Meds taken: VALTERON GEL  
* Ice/Heat applied: NO  
* Brace worn: SPLINT WITH ACE WRAP  
* Last Xray: TODAY (23)  
* Referred by:  ER  
* Accompanied by: Cousin Kamilla, patient gives verbal permission to discuss her 
  medical condition in front of her.  
  
  
Additional Source Comments  
  
  
  
                                                    INFORMATION SOURCE (unrecogn  
ized section and content)  
   
                                          
  
  
  
                                        DATE CREATED        AUTHOR  
   
                                2018                      [facility]  
  
  
  
                                DATE CREATED    AUTHOR          AUTHOR'S ORGANIZ  
ATION  
   
                                10/01/2022                      [facility]  
  
  
  
                                DATE CREATED    AUTHOR          AUTHOR'S ORGANIZ  
ATION  
   
                                2023                      [facility]  
  
  
  
                                DATE CREATED    AUTHOR          AUTHOR'S ORGANIZ  
ATION  
   
                                10/02/2023                      [facility]  
[facility]  
  
  
  
                                DATE CREATED    AUTHOR          AUTHOR'S ORGANIZ  
ATION  
   
                                10/03/2023                       [facility]  
  
  
  
                                DATE CREATED    AUTHOR          AUTHOR'S ORGANIZ  
ATION  
   
                                2024                      [facility]  
  
  
  
  
  
                                                    Source Comments (unrecognize  
d section and content)  
   
                                                    In the event this informatio  
n is protected by the Federal Confidentiality of   
Alcohol   
and Drug Abuse Patient Records regulations: This information has been disclosed 
to   
you from records protected by Federal confidentiality rules (42 CFR Part 2). The
   
Federal rules prohibit you from making any further disclosure of this 
information   
unless further disclosure is expressly permitted by the written consent of the 
person   
to whom it pertains or as otherwise permitted by 42 CFR Part 2. A general   
authorization for the release of medical or other information is NOT sufficient 
for   
this purpose. The Federal rules restrict any use of the information to 
criminally   
investigate or prosecute any alcohol or drug abuse patient.[facility] 
the   
event this information is protected by the Federal Confidentiality of Alcohol 
and   
Drug Abuse Patient Records regulations: This information has been disclosed to 
you   
from records protected by Federal confidentiality rules (42 CFR Part 2). The 
Federal   
rules prohibit you from making any further disclosure of this information unless
   
further disclosure is expressly permitted by the written consent of the person 
to   
whom it pertains or as otherwise permitted by 42 CFR Part 2. A general 
authorization   
for the release of medical or other information is NOT sufficient for this 
purpose.   
The Federal rules restrict any use of the information to criminally investigate 
or   
prosecute any alcohol or drug abuse patient.[facility] Hospital[facility] the event this   
information is protected by the Federal Confidentiality of Alcohol and Drug 
Abuse   
Patient Records regulations: This information has been disclosed to you from 
records   
protected by Federal confidentiality rules (42 CFR Part 2). The Federal rules   
prohibit you from making any further disclosure of this information unless 
further   
disclosure is expressly permitted by the written consent of the person to whom 
it   
pertains or as otherwise permitted by 42 CFR Part 2. A general authorization for
 the   
release of medical or other information is NOT sufficient for this purpose. The   
Federal rules restrict any use of the information to criminally investigate or   
prosecute any alcohol or drug abuse patient.[facility] the event this   
information is protected by the Federal Confidentiality of Alcohol and Drug 
Abuse   
Patient Records regulations: This information has been disclosed to you from 
records   
protected by Federal confidentiality rules (42 CFR Part 2). The Federal rules   
prohibit you from making any further disclosure of this information unless 
further   
disclosure is expressly permitted by the written consent of the person to whom 
it   
pertains or as otherwise permitted by 42 CFR Part 2. A general authorization for
 the   
release of medical or other information is NOT sufficient for this purpose. The   
Federal rules restrict any use of the information to criminally investigate or   
prosecute any alcohol or drug abuse patient.[facility] HospitalIn the event this   
information is protected by the Federal Confidentiality of Alcohol and Drug 
Abuse   
Patient Records regulations: This information has been disclosed to you from 
records   
protected by Federal confidentiality rules (42 CFR Part 2). The Federal rules   
prohibit you from making any further disclosure of this information unless 
further   
disclosure is expressly permitted by the written consent of the person to whom 
it   
pertains or as otherwise permitted by 42 CFR Part 2. A general authorization for
 the   
release of medical or other information is NOT sufficient for this purpose. The   
Federal rules restrict any use of the information to criminally investigate or   
prosecute any alcohol or drug abuse patient.[facility] HospitalIn the event this   
information is protected by the Federal Confidentiality of Alcohol and Drug 
Abuse   
Patient Records regulations: This information has been disclosed to you from 
records   
protected by Federal confidentiality rules (42 CFR Part 2). The Federal rules   
prohibit you from making any further disclosure of this information unless 
further   
disclosure is expressly permitted by the written consent of the person to whom 
it   
pertains or as otherwise permitted by 42 CFR Part 2. A general authorization for
 the   
release of medical or other information is NOT sufficient for this purpose. The   
Federal rules restrict any use of the information to criminally investigate or   
prosecute any alcohol or drug abuse patient.[facility] HospitalIn the event this   
information is protected by the Federal Confidentiality of Alcohol and Drug 
Abuse   
Patient Records regulations: This information has been disclosed to you from 
records   
protected by Federal confidentiality rules (42 CFR Part 2). The Federal rules   
prohibit you from making any further disclosure of this information unless 
further   
disclosure is expressly permitted by the written consent of the person to whom 
it   
pertains or as otherwise permitted by 42 CFR Part 2. A general authorization for
 the   
release of medical or other information is NOT sufficient for this purpose. The   
Federal rules restrict any use of the information to criminally investigate or   
prosecute any alcohol or drug abuse patient.[facility] HospitalIn the event this   
information is protected by the Federal Confidentiality of Alcohol and Drug 
Abuse   
Patient Records regulations: This information has been disclosed to you from 
records   
protected by Federal confidentiality rules (42 CFR Part 2). The Federal rules   
prohibit you from making any further disclosure of this information unless 
further   
disclosure is expressly permitted by the written consent of the person to whom 
it   
pertains or as otherwise permitted by 42 CFR Part 2. A general authorization for
 the   
release of medical or other information is NOT sufficient for this purpose. The   
Federal rules restrict any use of the information to criminally investigate or   
prosecute any alcohol or drug abuse patient.[facility] HospitalIn the event this   
information is protected by the Federal Confidentiality of Alcohol and Drug 
Abuse   
Patient Records regulations: This information has been disclosed to you from 
records   
protected by Federal confidentiality rules (42 CFR Part 2). The Federal rules   
prohibit you from making any further disclosure of this information unless 
further   
disclosure is expressly permitted by the written consent of the person to whom 
it   
pertains or as otherwise permitted by 42 CFR Part 2. A general authorization for
 the   
release of medical or other information is NOT sufficient for this purpose. The   
Federal rules restrict any use of the information to criminally investigate or   
prosecute any alcohol or drug abuse patient.[facility] HospitalIn the event this   
information is protected by the Federal Confidentiality of Alcohol and Drug 
Abuse   
Patient Records regulations: This information has been disclosed to you from 
records   
protected by Federal confidentiality rules (42 CFR Part 2). The Federal rules   
prohibit you from making any further disclosure of this information unless 
further   
disclosure is expressly permitted by the written consent of the person to whom 
it   
pertains or as otherwise permitted by 42 CFR Part 2. A general authorization for
 the   
release of medical or other information is NOT sufficient for this purpose. The   
Federal rules restrict any use of the information to criminally investigate or   
prosecute any alcohol or drug abuse patient.[facility] HospitalIn the event this   
information is protected by the Federal Confidentiality of Alcohol and Drug 
Abuse   
Patient Records regulations: This information has been disclosed to you from 
records   
protected by Federal confidentiality rules (42 CFR Part 2). The Federal rules   
prohibit you from making any further disclosure of this information unless 
further   
disclosure is expressly permitted by the written consent of the person to whom 
it   
pertains or as otherwise permitted by 42 CFR Part 2. A general authorization for
 the   
release of medical or other information is NOT sufficient for this purpose. The   
Federal rules restrict any use of the information to criminally investigate or   
prosecute any alcohol or drug abuse patient.[facility] HospitalIn the event this   
information is protected by the Federal Confidentiality of Alcohol and Drug 
Abuse   
Patient Records regulations: This information has been disclosed to you from 
records   
protected by Federal confidentiality rules (42 CFR Part 2). The Federal rules   
prohibit you from making any further disclosure of this information unless 
further   
disclosure is expressly permitted by the written consent of the person to whom 
it   
pertains or as otherwise permitted by 42 CFR Part 2. A general authorization for
 the   
release of medical or other information is NOT sufficient for this purpose. The   
Federal rules restrict any use of the information to criminally investigate or   
prosecute any alcohol or drug abuse patient.[facility] HospitalIn the event this   
information is protected by the Federal Confidentiality of Alcohol and Drug 
Abuse   
Patient Records regulations: This information has been disclosed to you from 
records   
protected by Federal confidentiality rules (42 CFR Part 2). The Federal rules   
prohibit you from making any further disclosure of this information unless 
further   
disclosure is expressly permitted by the written consent of the person to whom 
it   
pertains or as otherwise permitted by 42 CFR Part 2. A general authorization for
 the   
release of medical or other information is NOT sufficient for this purpose. The   
Federal rules restrict any use of the information to criminally investigate or   
prosecute any alcohol or drug abuse patient.[facility] HospitalIn the event this   
information is protected by the Federal Confidentiality of Alcohol and Drug 
Abuse   
Patient Records regulations: This information has been disclosed to you from 
records   
protected by Federal confidentiality rules (42 CFR Part 2). The Federal rules   
prohibit you from making any further disclosure of this information unless 
further   
disclosure is expressly permitted by the written consent of the person to whom 
it   
pertains or as otherwise permitted by 42 CFR Part 2. A general authorization for
 the   
release of medical or other information is NOT sufficient for this purpose. The   
Federal rules restrict any use of the information to criminally investigate or   
prosecute any alcohol or drug abuse patient.[facility] HospitalIn the event this   
information is protected by the Federal Confidentiality of Alcohol and Drug 
Abuse   
Patient Records regulations: This information has been disclosed to you from 
records   
protected by Federal confidentiality rules (42 CFR Part 2). The Federal rules   
prohibit you from making any further disclosure of this information unless 
further   
disclosure is expressly permitted by the written consent of the person to whom 
it   
pertains or as otherwise permitted by 42 CFR Part 2. A general authorization for
 the   
release of medical or other information is NOT sufficient for this purpose. The   
Federal rules restrict any use of the information to criminally investigate or   
prosecute any alcohol or drug abuse patient.[facility] HospitalIn the event this   
information is protected by the Federal Confidentiality of Alcohol and Drug 
Abuse   
Patient Records regulations: This information has been disclosed to you from 
records   
protected by Federal confidentiality rules (42 CFR Part 2). The Federal rules   
prohibit you from making any further disclosure of this information unless 
further   
disclosure is expressly permitted by the written consent of the person to whom 
it   
pertains or as otherwise permitted by 42 CFR Part 2. A general authorization for
 the   
release of medical or other information is NOT sufficient for this purpose. The   
Federal rules restrict any use of the information to criminally investigate or   
prosecute any alcohol or drug abuse patient.[facility] HospitalIn the event this   
information is protected by the Federal Confidentiality of Alcohol and Drug 
Abuse   
Patient Records regulations: This information has been disclosed to you from 
records   
protected by Federal confidentiality rules (42 CFR Part 2). The Federal rules   
prohibit you from making any further disclosure of this information unless 
further   
disclosure is expressly permitted by the written consent of the person to whom 
it   
pertains or as otherwise permitted by 42 CFR Part 2. A general authorization for
 the   
release of medical or other information is NOT sufficient for this purpose. The   
Federal rules restrict any use of the information to criminally investigate or   
prosecute any alcohol or drug abuse patient.[facility] HospitalIn the event this   
information is protected by the Federal Confidentiality of Alcohol and Drug 
Abuse   
Patient Records regulations: This information has been disclosed to you from 
records   
protected by Federal confidentiality rules (42 CFR Part 2). The Federal rules   
prohibit you from making any further disclosure of this information unless 
further   
disclosure is expressly permitted by the written consent of the person to whom 
it   
pertains or as otherwise permitted by 42 CFR Part 2. A general authorization for
 the   
release of medical or other information is NOT sufficient for this purpose. The   
Federal rules restrict any use of the information to criminally investigate or   
prosecute any alcohol or drug abuse patient.[facility] HospitalIn the event this   
information is protected by the Federal Confidentiality of Alcohol and Drug 
Abuse   
Patient Records regulations: This information has been disclosed to you from 
records   
protected by Federal confidentiality rules (42 CFR Part 2). The Federal rules   
prohibit you from making any further disclosure of this information unless 
further   
disclosure is expressly permitted by the written consent of the person to whom 
it   
pertains or as otherwise permitted by 42 CFR Part 2. A general authorization for
 the   
release of medical or other information is NOT sufficient for this purpose. The   
Federal rules restrict any use of the information to criminally investigate or   
prosecute any alcohol or drug abuse patient.[facility] HospitalIn the event this   
information is protected by the Federal Confidentiality of Alcohol and Drug 
Abuse   
Patient Records regulations: This information has been disclosed to you from 
records   
protected by Federal confidentiality rules (42 CFR Part 2). The Federal rules   
prohibit you from making any further disclosure of this information unless 
further   
disclosure is expressly permitted by the written consent of the person to whom 
it   
pertains or as otherwise permitted by 42 CFR Part 2. A general authorization for
 the   
release of medical or other information is NOT sufficient for this purpose. The   
Federal rules restrict any use of the information to criminally investigate or   
prosecute any alcohol or drug abuse patient.[facility] HospitalIn the event this   
information is protected by the Federal Confidentiality of Alcohol and Drug 
Abuse   
Patient Records regulations: This information has been disclosed to you from 
records   
protected by Federal confidentiality rules (42 CFR Part 2). The Federal rules   
prohibit you from making any further disclosure of this information unless 
further   
disclosure is expressly permitted by the written consent of the person to whom 
it   
pertains or as otherwise permitted by 42 CFR Part 2. A general authorization for
 the   
release of medical or other information is NOT sufficient for this purpose. The   
Federal rules restrict any use of the information to criminally investigate or   
prosecute any alcohol or drug abuse patient.[facility] HospitalIn the event this   
information is protected by the Federal Confidentiality of Alcohol and Drug 
Abuse   
Patient Records regulations: This information has been disclosed to you from 
records   
protected by Federal confidentiality rules (42 CFR Part 2). The Federal rules   
prohibit you from making any further disclosure of this information unless 
further   
disclosure is expressly permitted by the written consent of the person to whom 
it   
pertains or as otherwise permitted by 42 CFR Part 2. A general authorization for
 the   
release of medical or other information is NOT sufficient for this purpose. The   
Federal rules restrict any use of the information to criminally investigate or   
prosecute any alcohol or drug abuse patient.[facility] HospitalIn the event this   
information is protected by the Federal Confidentiality of Alcohol and Drug 
Abuse   
Patient Records regulations: This information has been disclosed to you from 
records   
protected by Federal confidentiality rules (42 CFR Part 2). The Federal rules   
prohibit you from making any further disclosure of this information unless 
further   
disclosure is expressly permitted by the written consent of the person to whom 
it   
pertains or as otherwise permitted by 42 CFR Part 2. A general authorization for
 the   
release of medical or other information is NOT sufficient for this purpose. The   
Federal rules restrict any use of the information to criminally investigate or   
prosecute any alcohol or drug abuse patient.[facility] HospitalIn the event this   
information is protected by the Federal Confidentiality of Alcohol and Drug 
Abuse   
Patient Records regulations: This information has been disclosed to you from 
records   
protected by Federal confidentiality rules (42 CFR Part 2). The Federal rules   
prohibit you from making any further disclosure of this information unless 
further   
disclosure is expressly permitted by the written consent of the person to whom 
it   
pertains or as otherwise permitted by 42 CFR Part 2. A general authorization for
 the   
release of medical or other information is NOT sufficient for this purpose. The   
Federal rules restrict any use of the information to criminally investigate or   
prosecute any alcohol or drug abuse patient.[facility] HospitalIn the event this   
information is protected by the Federal Confidentiality of Alcohol and Drug 
Abuse   
Patient Records regulations: This information has been disclosed to you from 
records   
protected by Federal confidentiality rules (42 CFR Part 2). The Federal rules   
prohibit you from making any further disclosure of this information unless 
further   
disclosure is expressly permitted by the written consent of the person to whom 
it   
pertains or as otherwise permitted by 42 CFR Part 2. A general authorization for
 the   
release of medical or other information is NOT sufficient for this purpose. The   
Federal rules restrict any use of the information to criminally investigate or   
prosecute any alcohol or drug abuse patient.[facility] HospitalIn the event this   
information is protected by the Federal Confidentiality of Alcohol and Drug 
Abuse   
Patient Records regulations: This information has been disclosed to you from 
records   
protected by Federal confidentiality rules (42 CFR Part 2). The Federal rules   
prohibit you from making any further disclosure of this information unless 
further   
disclosure is expressly permitted by the written consent of the person to whom 
it   
pertains or as otherwise permitted by 42 CFR Part 2. A general authorization for
 the   
release of medical or other information is NOT sufficient for this purpose. The   
Federal rules restrict any use of the information to criminally investigate or   
prosecute any alcohol or drug abuse patient.[facility] HospitalIn the event this   
information is protected by the Federal Confidentiality of Alcohol and Drug 
Abuse   
Patient Records regulations: This information has been disclosed to you from 
records   
protected by Federal confidentiality rules (42 CFR Part 2). The Federal rules   
prohibit you from making any further disclosure of this information unless 
further   
disclosure is expressly permitted by the written consent of the person to whom 
it   
pertains or as otherwise permitted by 42 CFR Part 2. A general authorization for
 the   
release of medical or other information is NOT sufficient for this purpose. The   
Federal rules restrict any use of the information to criminally investigate or   
prosecute any alcohol or drug abuse patient.[facility] HospitalIn the event this   
information is protected by the Federal Confidentiality of Alcohol and Drug 
Abuse   
Patient Records regulations: This information has been disclosed to you from 
records   
protected by Federal confidentiality rules (42 CFR Part 2). The Federal rules   
prohibit you from making any further disclosure of this information unless 
further   
disclosure is expressly permitted by the written consent of the person to whom 
it   
pertains or as otherwise permitted by 42 CFR Part 2. A general authorization for
 the   
release of medical or other information is NOT sufficient for this purpose. The   
Federal rules restrict any use of the information to criminally investigate or   
prosecute any alcohol or drug abuse patient.[facility] HospitalIn the event this   
information is protected by the Federal Confidentiality of Alcohol and Drug 
Abuse   
Patient Records regulations: This information has been disclosed to you from 
records   
protected by Federal confidentiality rules (42 CFR Part 2). The Federal rules   
prohibit you from making any further disclosure of this information unless 
further   
disclosure is expressly permitted by the written consent of the person to whom 
it   
pertains or as otherwise permitted by 42 CFR Part 2. A general authorization for
 the   
release of medical or other information is NOT sufficient for this purpose. The   
Federal rules restrict any use of the information to criminally investigate or   
prosecute any alcohol or drug abuse patient.[facility] HospitalIn the event this   
information is protected by the Federal Confidentiality of Alcohol and Drug 
Abuse   
Patient Records regulations: This information has been disclosed to you from 
records   
protected by Federal confidentiality rules (42 CFR Part 2). The Federal rules   
prohibit you from making any further disclosure of this information unless 
further   
disclosure is expressly permitted by the written consent of the person to whom 
it   
pertains or as otherwise permitted by 42 CFR Part 2. A general authorization for
 the   
release of medical or other information is NOT sufficient for this purpose. The   
Federal rules restrict any use of the information to criminally investigate or   
prosecute any alcohol or drug abuse patient.[facility] HospitalIn the event this   
information is protected by the Federal Confidentiality of Alcohol and Drug 
Abuse   
Patient Records regulations: This information has been disclosed to you from 
records   
protected by Federal confidentiality rules (42 CFR Part 2). The Federal rules   
prohibit you from making any further disclosure of this information unless 
further   
disclosure is expressly permitted by the written consent of the person to whom 
it   
pertains or as otherwise permitted by 42 CFR Part 2. A general authorization for
 the   
release of medical or other information is NOT sufficient for this purpose. The   
Federal rules restrict any use of the information to criminally investigate or   
prosecute any alcohol or drug abuse patient.[facility] HospitalIn the event this   
information is protected by the Federal Confidentiality of Alcohol and Drug 
Abuse   
Patient Records regulations: This information has been disclosed to you from 
records   
protected by Federal confidentiality rules (42 CFR Part 2). The Federal rules   
prohibit you from making any further disclosure of this information unless 
further   
disclosure is expressly permitted by the written consent of the person to whom 
it   
pertains or as otherwise permitted by 42 CFR Part 2. A general authorization for
 the   
release of medical or other information is NOT sufficient for this purpose. The   
Federal rules restrict any use of the information to criminally investigate or   
prosecute any alcohol or drug abuse patient.[facility] HospitalIn the event this   
information is protected by the Federal Confidentiality of Alcohol and Drug 
Abuse   
Patient Records regulations: This information has been disclosed to you from 
records   
protected by Federal confidentiality rules (42 CFR Part 2). The Federal rules   
prohibit you from making any further disclosure of this information unless 
further   
disclosure is expressly permitted by the written consent of the person to whom 
it   
pertains or as otherwise permitted by 42 CFR Part 2. A general authorization for
 the   
release of medical or other information is NOT sufficient for this purpose. The   
Federal rules restrict any use of the information to criminally investigate or   
prosecute any alcohol or drug abuse patient.[facility] HospitalIn the event this   
information is protected by the Federal Confidentiality of Alcohol and Drug 
Abuse   
Patient Records regulations: This information has been disclosed to you from 
records   
protected by Federal confidentiality rules (42 CFR Part 2). The Federal rules   
prohibit you from making any further disclosure of this information unless 
further   
disclosure is expressly permitted by the written consent of the person to whom 
it   
pertains or as otherwise permitted by 42 CFR Part 2. A general authorization for
 the   
release of medical or other information is NOT sufficient for this purpose. The   
Federal rules restrict any use of the information to criminally investigate or   
prosecute any alcohol or drug abuse patient.[facility] HospitalIn the event this   
information is protected by the Federal Confidentiality of Alcohol and Drug 
Abuse   
Patient Records regulations: This information has been disclosed to you from 
records   
protected by Federal confidentiality rules (42 CFR Part 2). The Federal rules   
prohibit you from making any further disclosure of this information unless 
further   
disclosure is expressly permitted by the written consent of the person to whom 
it   
pertains or as otherwise permitted by 42 CFR Part 2. A general authorization for
 the   
release of medical or other information is NOT sufficient for this purpose. The   
Federal rules restrict any use of the information to criminally investigate or   
prosecute any alcohol or drug abuse patient.[facility] HospitalIn the event this   
information is protected by the Federal Confidentiality of Alcohol and Drug 
Abuse   
Patient Records regulations: This information has been disclosed to you from 
records   
protected by Federal confidentiality rules (42 CFR Part 2). The Federal rules   
prohibit you from making any further disclosure of this information unless 
further   
disclosure is expressly permitted by the written consent of the person to whom 
it   
pertains or as otherwise permitted by 42 CFR Part 2. A general authorization for
 the   
release of medical or other information is NOT sufficient for this purpose. The   
Federal rules restrict any use of the information to criminally investigate or   
prosecute any alcohol or drug abuse patient.[facility] HospitalIn the event this   
information is protected by the Federal Confidentiality of Alcohol and Drug 
Abuse   
Patient Records regulations: This information has been disclosed to you from 
records   
protected by Federal confidentiality rules (42 CFR Part 2). The Federal rules   
prohibit you from making any further disclosure of this information unless 
further   
disclosure is expressly permitted by the written consent of the person to whom 
it   
pertains or as otherwise permitted by 42 CFR Part 2. A general authorization for
 the   
release of medical or other information is NOT sufficient for this purpose. The   
Federal rules restrict any use of the information to criminally investigate or   
prosecute any alcohol or drug abuse patient.[facility] HospitalIn the event this   
information is protected by the Federal Confidentiality of Alcohol and Drug 
Abuse   
Patient Records regulations: This information has been disclosed to you from 
records   
protected by Federal confidentiality rules (42 CFR Part 2). The Federal rules   
prohibit you from making any further disclosure of this information unless 
further   
disclosure is expressly permitted by the written consent of the person to whom 
it   
pertains or as otherwise permitted by 42 CFR Part 2. A general authorization for
 the   
release of medical or other information is NOT sufficient for this purpose. The   
Federal rules restrict any use of the information to criminally investigate or   
prosecute any alcohol or drug abuse patient.[facility] HospitalIn the event this   
information is protected by the Federal Confidentiality of Alcohol and Drug 
Abuse   
Patient Records regulations: This information has been disclosed to you from 
records   
protected by Federal confidentiality rules (42 CFR Part 2). The Federal rules   
prohibit you from making any further disclosure of this information unless 
further   
disclosure is expressly permitted by the written consent of the person to whom 
it   
pertains or as otherwise permitted by 42 CFR Part 2. A general authorization for
 the   
release of medical or other information is NOT sufficient for this purpose. The   
Federal rules restrict any use of the information to criminally investigate or   
prosecute any alcohol or drug abuse patient.[facility] HospitalIn the event this   
information is protected by the Federal Confidentiality of Alcohol and Drug 
Abuse   
Patient Records regulations: This information has been disclosed to you from 
records   
protected by Federal confidentiality rules (42 CFR Part 2). The Federal rules   
prohibit you from making any further disclosure of this information unless 
further   
disclosure is expressly permitted by the written consent of the person to whom 
it   
pertains or as otherwise permitted by 42 CFR Part 2. A general authorization for
 the   
release of medical or other information is NOT sufficient for this purpose. The   
Federal rules restrict any use of the information to criminally investigate or   
prosecute any alcohol or drug abuse patient.[facility] HospitalIn the event this   
information is protected by the Federal Confidentiality of Alcohol and Drug 
Abuse   
Patient Records regulations: This information has been disclosed to you from 
records   
protected by Federal confidentiality rules (42 CFR Part 2). The Federal rules   
prohibit you from making any further disclosure of this information unless 
further   
disclosure is expressly permitted by the written consent of the person to whom 
it   
pertains or as otherwise permitted by 42 CFR Part 2. A general authorization for
 the   
release of medical or other information is NOT sufficient for this purpose. The   
Federal rules restrict any use of the information to criminally investigate or   
prosecute any alcohol or drug abuse patient.[facility] HospitalIn the event this   
information is protected by the Federal Confidentiality of Alcohol and Drug 
Abuse   
Patient Records regulations: This information has been disclosed to you from 
records   
protected by Federal confidentiality rules (42 CFR Part 2). The Federal rules   
prohibit you from making any further disclosure of this information unless 
further   
disclosure is expressly permitted by the written consent of the person to whom 
it   
pertains or as otherwise permitted by 42 CFR Part 2. A general authorization for
 the   
release of medical or other information is NOT sufficient for this purpose. The   
Federal rules restrict any use of the information to criminally investigate or   
prosecute any alcohol or drug abuse patient.[facility] HospitalIn the event this   
information is protected by the Federal Confidentiality of Alcohol and Drug 
Abuse   
Patient Records regulations: This information has been disclosed to you from 
records   
protected by Federal confidentiality rules (42 CFR Part 2). The Federal rules   
prohibit you from making any further disclosure of this information unless 
further   
disclosure is expressly permitted by the written consent of the person to whom 
it   
pertains or as otherwise permitted by 42 CFR Part 2. A general authorization for
 the   
release of medical or other information is NOT sufficient for this purpose. The   
Federal rules restrict any use of the information to criminally investigate or   
prosecute any alcohol or drug abuse patient.[facility] HospitalIn the event this   
information is protected by the Federal Confidentiality of Alcohol and Drug 
Abuse   
Patient Records regulations: This information has been disclosed to you from 
records   
protected by Federal confidentiality rules (42 CFR Part 2). The Federal rules   
prohibit you from making any further disclosure of this information unless 
further   
disclosure is expressly permitted by the written consent of the person to whom 
it   
pertains or as otherwise permitted by 42 CFR Part 2. A general authorization for
 the   
release of medical or other information is NOT sufficient for this purpose. The   
Federal rules restrict any use of the information to criminally investigate or   
prosecute any alcohol or drug abuse patient.[facility] HospitalIn the event this   
information is protected by the Federal Confidentiality of Alcohol and Drug 
Abuse   
Patient Records regulations: This information has been disclosed to you from 
records   
protected by Federal confidentiality rules (42 CFR Part 2). The Federal rules   
prohibit you from making any further disclosure of this information unless 
further   
disclosure is expressly permitted by the written consent of the person to whom 
it   
pertains or as otherwise permitted by 42 CFR Part 2. A general authorization for
 the   
release of medical or other information is NOT sufficient for this purpose. The   
Federal rules restrict any use of the information to criminally investigate or   
prosecute any alcohol or drug abuse patient.[facility] HospitalIn the event this   
information is protected by the Federal Confidentiality of Alcohol and Drug 
Abuse   
Patient Records regulations: This information has been disclosed to you from 
records   
protected by Federal confidentiality rules (42 CFR Part 2). The Federal rules   
prohibit you from making any further disclosure of this information unless 
further   
disclosure is expressly permitted by the written consent of the person to whom 
it   
pertains or as otherwise permitted by 42 CFR Part 2. A general authorization for
 the   
release of medical or other information is NOT sufficient for this purpose. The   
Federal rules restrict any use of the information to criminally investigate or   
prosecute any alcohol or drug abuse patient.[facility] HospitalIn the event this   
information is protected by the Federal Confidentiality of Alcohol and Drug 
Abuse   
Patient Records regulations: This information has been disclosed to you from 
records   
protected by Federal confidentiality rules (42 CFR Part 2). The Federal rules   
prohibit you from making any further disclosure of this information unless 
further   
disclosure is expressly permitted by the written consent of the person to whom 
it   
pertains or as otherwise permitted by 42 CFR Part 2. A general authorization for
 the   
release of medical or other information is NOT sufficient for this purpose. The   
Federal rules restrict any use of the information to criminally investigate or   
prosecute any alcohol or drug abuse patient.[facility] HospitalIn the event this   
information is protected by the Federal Confidentiality of Alcohol and Drug 
Abuse   
Patient Records regulations: This information has been disclosed to you from 
records   
protected by Federal confidentiality rules (42 CFR Part 2). The Federal rules   
prohibit you from making any further disclosure of this information unless 
further   
disclosure is expressly permitted by the written consent of the person to whom 
it   
pertains or as otherwise permitted by 42 CFR Part 2. A general authorization for
 the   
release of medical or other information is NOT sufficient for this purpose. The   
Federal rules restrict any use of the information to criminally investigate or   
prosecute any alcohol or drug abuse patient.[facility] HospitalIn the event this   
information is protected by the Federal Confidentiality of Alcohol and Drug 
Abuse   
Patient Records regulations: This information has been disclosed to you from 
records   
protected by Federal confidentiality rules (42 CFR Part 2). The Federal rules   
prohibit you from making any further disclosure of this information unless 
further   
disclosure is expressly permitted by the written consent of the person to whom 
it   
pertains or as otherwise permitted by 42 CFR Part 2. A general authorization for
 the   
release of medical or other information is NOT sufficient for this purpose. The   
Federal rules restrict any use of the information to criminally investigate or   
prosecute any alcohol or drug abuse patient.[facility] HospitalIn the event this   
information is protected by the Federal Confidentiality of Alcohol and Drug 
Abuse   
Patient Records regulations: This information has been disclosed to you from 
records   
protected by Federal confidentiality rules (42 CFR Part 2). The Federal rules   
prohibit you from making any further disclosure of this information unless 
further   
disclosure is expressly permitted by the written consent of the person to whom 
it   
pertains or as otherwise permitted by 42 CFR Part 2. A general authorization for
 the   
release of medical or other information is NOT sufficient for this purpose. The   
Federal rules restrict any use of the information to criminally investigate or   
prosecute any alcohol or drug abuse patient.[facility] HospitalIn the event this   
information is protected by the Federal Confidentiality of Alcohol and Drug 
Abuse   
Patient Records regulations: This information has been disclosed to you from 
records   
protected by Federal confidentiality rules (42 CFR Part 2). The Federal rules   
prohibit you from making any further disclosure of this information unless 
further   
disclosure is expressly permitted by the written consent of the person to whom 
it   
pertains or as otherwise permitted by 42 CFR Part 2. A general authorization for
 the   
release of medical or other information is NOT sufficient for this purpose. The   
Federal rules restrict any use of the information to criminally investigate or   
prosecute any alcohol or drug abuse patient.[facility] HospitalIn the event this   
information is protected by the Federal Confidentiality of Alcohol and Drug 
Abuse   
Patient Records regulations: This information has been disclosed to you from 
records   
protected by Federal confidentiality rules (42 CFR Part 2). The Federal rules   
prohibit you from making any further disclosure of this information unless 
further   
disclosure is expressly permitted by the written consent of the person to whom 
it   
pertains or as otherwise permitted by 42 CFR Part 2. A general authorization for
 the   
release of medical or other information is NOT sufficient for this purpose. The   
Federal rules restrict any use of the information to criminally investigate or   
prosecute any alcohol or drug abuse patient.[facility] HospitalIn the event this   
information is protected by the Federal Confidentiality of Alcohol and Drug 
Abuse   
Patient Records regulations: This information has been disclosed to you from 
records   
protected by Federal confidentiality rules (42 CFR Part 2). The Federal rules   
prohibit you from making any further disclosure of this information unless 
further   
disclosure is expressly permitted by the written consent of the person to whom 
it   
pertains or as otherwise permitted by 42 CFR Part 2. A general authorization for
 the   
release of medical or other information is NOT sufficient for this purpose. The   
Federal rules restrict any use of the information to criminally investigate or   
prosecute any alcohol or drug abuse patient.[facility]  
  
  
  
  
                                                    Reason for Visit (unrecogniz  
ed section and content)  
   
                                          
  
  
  
                                        Reason              Comments  
   
                                        Established Patient Vomiting without thomas  
sea, unspecified vomiting type  
  
  
  
                          Specialty    Diagnoses / Procedures Referred By Contac  
t Referred To Contact  
   
                                        Gastroenterology      
  
  
Diagnoses  
  
  
Vomiting without nausea,   
unspecified vomiting type  
  
  
  
Procedures  
  
  
CONSULT TO   
GASTROENTEROLOGY  
  
  
OFFICE/OUTPATIENT [facility] 60-74 MINUTES                    
  
  
Antione NetoNAYAN.[facility]  
  
  
[address],   
[address]  
  
  
[place] [address]  
  
  
Phone: [phone]  
  
  
Fax: [phone]                         
  
  
  
  
  
                    Referral ID Status    Reason    Start Date Expiration Date V  
isits   
Requested                               Visits   
Authorized  
   
                                [id number]        Closed            
  
  
PCP Requested   
Referral        2022      1               1  
  
  
  
                                        Reason              Comments  
   
                                        Vaginal Problem       
  
  
  
                                        Reason              Comments  
   
                                        Results               
  
  
  
                                        Reason              Comments  
   
                                        UTI                 frequency, abnormal   
discharge, odor x1 week  
  
  
  
                                        Reason              Comments  
   
                                        Results             Candida  
  
  
  
                                        Reason              Comments  
   
                                        left ring fingernail pain ripped fingern  
ail off last friday  
  
  
  
                                        Reason              Comments  
   
                                        Patient Question      
  
  
  
                                        Reason              Comments  
   
                                        Acute Visit         nail detached from n  
ail bed on left hand x 1 day  
  
  
  
                                        Reason              Comments  
   
                                        Vaginal Problem     Size of golf ball- n  
oticed it  night- painful  
  
  
  
                                        Reason              Comments  
   
                                        Nurse Triage Call     
  
  
  
                                        Reason              Comments  
   
                                        Follow Up           Presents today for a  
 follow up from 22 appointment.  
  
  
  
                                        Reason              Comments  
   
                                        Cough               Cough, vomiting, fat  
igue and HA x 2-3 weeks  
  
  
  
                                        Reason              Comments  
   
                                        Vomiting              
  
  
  
                                        Reason              Comments  
   
                                        Ear Pain            right, pressure x 1   
day  
  
  
  
                                        Reason              Comments  
   
                                        Radio GI Main [id number]     
  
  
  
                          Specialty    Diagnoses / Procedures Referred By Contac  
t Referred To Contact  
   
                                        XR IMAGING            
  
  
Diagnoses  
  
  
Vomiting without nausea,   
unspecified vomiting type  
  
  
Generalized abdominal pain  
  
  
  
Procedures  
  
  
XR UPPER GI SINGLE CONTRAST  
  
  
RADIOLOGIC EXAM UPR GI TRC   
SINGLE CONTRAST STUDY                     
  
  
Bijan Reeves MD  
  
  
 [facility]  
  
  
[address]  
  
  
[place]   
[address]  
  
  
Phone: [phone]  
  
  
Fax: [phone]                         
  
  
[facility]  
  
  
  
                    Referral ID Status    Reason    Start Date Expiration Date V  
isits   
Requested                               Visits   
Authorized  
   
                                [id number]        Closed            
  
  
Auto-Generate  
d Referral      2023      1               1  
  
  
  
                                        Reason              Comments  
   
                                        Medication Authorization vancomycin (VAN  
COCIN) 125 mg capsule  
  
  
  
                                        Reason              Comments  
   
                                        ED Follow-up        Presents today for a  
 ER follow up.  
  
  
  
                                Reason          Onset Date      Comments  
   
                                Refill Request  04/10/2023        
  
  
  
                                        Reason              Comments  
   
                                        Sore Throat         vomiting x 2 days  
  
  
  
                                        Reason              Comments  
   
                                        Rash                on arms and torso x   
today, white tongue x 1 day, vomiting, strep + monday  
  
  
  
                                        Reason              Comments  
   
                                        Sore Throat         Finished atb on Thur  
sday for strep, woke up today with sore throat,   
not   
feeling well  
  
  
  
                                        Reason              Comments  
   
                                        Sore Throat         Strep throat symptom  
s x 2 months  
  
  
  
                                        Reason              Comments  
   
                                        Pain (Shoulder Pain) L shoulder pain x1   
day, occurred at work, not [facility]  
  
  
  
                                        Reason              Comments  
   
                                        New                   
   
                                        Pain                  
  
  
  
                                        Reason              Comments  
   
                                        Urinary Problem     Pt reported burning,  
 frequency x1 day.  
  
  
  
                                        Reason              Comments  
   
                                        UTI                 Burning and pain x 1  
 day  
  
  
  
                                        Reason              Comments  
   
                                        Headache            Onset x 2 weeks ago,  
 horse bucked and she jerked forward. Saw   
Chiropractor   
Tried ice and OTC Meds (Tylenol/Motrin). Had blurred vision/lost peripheral   
vision. Had pins and needle feeling in left side of face  
  
  
  
                                        Reason              Comments  
   
                                        New Patient Evaluation   
  
  
  
                          Specialty    Diagnoses / Procedures Referred By Contac  
t Referred To Contact  
   
                                        Pediatric Neurology   
  
  
Diagnoses  
  
  
History of concussion  
  
  
  
Procedures  
  
  
CONSULT TO PEDS   
NEUROLOGY  
  
  
OFFICE/OUTPATIENT [facility] 60-74 MINUTES                    
  
  
Neto Talbot APRN.CNP  
  
  
[address]   
[address]  
  
  
[place] [id number]  
  
  
Phone: [phone]  
  
  
Fax: [phone]                         
  
  
  
  
  
                    Referral ID Status    Reason    Start Date Expiration Date V  
isits   
Requested                               Visits   
Authorized  
   
                                [id number]        Closed            
  
  
PCP Requested   
Referral        10/5/2023       10/4/2024       1               1  
  
  
  
                                        Reason              Comments  
   
                                        Insurance Authorization MRI/CT  
  
  
  
                          Specialty    Diagnoses / Procedures Referred By Contac  
t Referred To Contact  
   
                                        CT IMAGING            
  
  
Diagnoses  
  
  
Injury of head, subsequent   
encounter  
  
  
  
Procedures  
  
  
CT BRAIN WO IVCON  
  
  
CT HEAD/BRAIN W/O CONTRAST   
MATERIAL                                  
  
  
Amalia Marquez APRN.CNP  
  
  
[address] [place] [place] [address]  
  
  
Phone: [phone]  
  
  
Fax: [phone]                         
  
  
Ct Imaging  
  
  
[facility]  
  
  
  
                    Referral ID Status    Reason    Start Date Expiration Date V  
isits   
Requested                               Visits   
Authorized  
   
                                [id number]        Closed            
  
  
Auto-Generate  
d Referral      10/13/2023      2023      1               1  
  
  
  
                                        Reason              Comments  
   
                                        Radiology CT          
  
  
  
                          Specialty    Diagnoses / Procedures Referred By Contac  
t Referred To Contact  
   
                                        CT IMAGING            
  
  
Diagnoses  
  
  
Injury of head, subsequent   
encounter  
  
  
  
Procedures  
  
  
CTA NECK W IVCON  
  
  
CT ANGIOGRAPHY NECK   
W/CONTRAST/NONCONTRAST                    
  
  
Amalia Marquez APRN.CNP  
  
  
[address] [place] [address]  
  
  
Phone: [phone]  
  
  
Fax: [phone]                         
  
  
Ct Imaging  
  
  
[facility]  
  
  
  
                    Referral ID Status    Reason    Start Date Expiration Date V  
isits   
Requested                               Visits   
Authorized  
   
                                [id number]        Closed            
  
  
Auto-Generate  
d Referral      10/13/2023      2023      1               1  
  
  
  
                                        Reason              Comments  
   
                                        Follow Up           4-6 moth follow upCo  
ncussion (post horse accident)  
  
  
  
                                Reason          Onset Date      Comments  
   
                                Refill Request  2024        
  
  
  
                                        Reason              Comments  
   
                                        Discussion            
  
  
  
                                        Reason              Comments  
   
                                        Insurance Authorization Received insuran  
ce authorization approval from [facility]   
for MRI/CAT Scan. Start date: 2024End   
date:4Reference #[id number]  
  
  
  
                          Specialty    Diagnoses / Procedures Referred By Contac  
t Referred To Contact  
   
                                        MR IMAGING            
  
  
Diagnoses  
  
  
Migraine with aura and   
without status migrainosus,   
not intractable  
  
  
New onset headache  
  
  
  
Procedures  
  
  
MRI BRAIN WO/W IVCON  
  
  
MRI BRAIN BRAIN STEM W/O   
W/CONTRAST MATERIAL                       
  
  
Amalia Marquez,   
APRN.CNP  
  
  
[address] [place] [address]  
  
  
Phone: [phone]  
  
  
Fax: [phone]                         
  
  
Mr Imaging  
  
  
[place] [address]  
  
  
  
                    Referral ID Status    Reason    Start Date Expiration Date V  
isits   
Requested                               Visits   
Authorized  
   
                                [id number]        Closed            
  
  
Auto-Generate  
d Referral      2024       1               1  
  
  
  
                                        Reason              Comments  
   
                                        Follow Up             
  
  
  
                                        Reason              Comments  
   
                                        Refill Request        
  
  
  
  
  
                                                    Care Teams (unrecognized sec  
tion and content)  
   
                                          
  
  
  
                      Team Member Relationship Specialty  Start Date End Date  
   
                                                      
  
  
Ashley Sierra, DO  
  
  
[address] [place] [address]  
  
  
[phone] (Work)  
  
  
[phone] (Fax) PCP - General                   03            
  
  
  
                      Team Member Relationship Specialty  Start Date End Date  
   
                                                      
  
  
Ashley Sierra DO  
  
  
[address] [address] [place] [address]  
  
  
[phone] (Work)  
  
  
[phone] (Fax) PCP - General                   03            
  
  
  
                      Team Member Relationship Specialty  Start Date End Date  
   
                                                      
  
  
Ashley Sierra DO  
  
  
[address] [address] [place] [address]  
  
  
[phone] (Work)  
  
  
[phone] (Fax) PCP - General                   03            
  
  
  
                      Team Member Relationship Specialty  Start Date End Date  
   
                                                      
  
  
Ashley Sierra DO  
  
  
[address] [address] [place] [address]  
  
  
[phone] (Work)  
  
  
[phone] (Fax) PCP - General                   03            
  
  
  
                      Team Member Relationship Specialty  Start Date End Date  
   
                                                      
  
  
Ashley Sierra DO  
  
  
[address] [address]  
  
  
[place] [address]  
  
  
[phone] (Work)  
  
  
[phone] (Fax) PCP - General                   03            
  
  
  
                      Team Member Relationship Specialty  Start Date End Date  
   
                                                      
  
  
Ashley Sierra, DO  
  
  
[address]  
  
  
[place] [address]  
  
  
[phone] (Work)  
  
  
[phone] (Fax) PCP - General                   03            
  
  
  
                      Team Member Relationship Specialty  Start Date End Date  
   
                                                      
  
  
Ashley Sierra, DO  
  
  
[address] [address]  
  
  
[place] [address]  
  
  
[phone] (Work)  
  
  
[phone] (Fax) PCP - General                   03            
  
  
  
                      Team Member Relationship Specialty  Start Date End Date  
   
                                                      
  
  
Ashley Sierra, DO  
  
  
[address]  
  
  
[place] [address]  
  
  
[phone] (Work)  
  
  
[phone] (Fax) PCP - General                   03            
  
  
  
                      Team Member Relationship Specialty  Start Date End Date  
   
                                                      
  
  
Ashley Sierra, DO  
  
  
[address]  
  
  
[place] [address]  
  
  
[phone] (Work)  
  
  
[phone] (Fax) PCP - General                   03            
  
  
  
                      Team Member Relationship Specialty  Start Date End Date  
   
                                                      
  
  
Ashley Sierra, DO  
  
  
[address]  
  
  
[place] [address]  
  
  
[phone] (Work)  
  
  
[phone] (Fax) PCP - General                   03            
  
  
  
                      Team Member Relationship Specialty  Start Date End Date  
   
                                                      
  
  
Ashley Sierra, DO  
  
  
[address]  
  
  
[place] [address]  
  
  
[phone] (Work)  
  
  
[phone] (Fax) PCP - General                   03            
  
  
  
                      Team Member Relationship Specialty  Start Date End Date  
   
                                                      
  
  
Ashley Sierra, DO  
  
  
[address]  
  
  
[place] [address]  
  
  
[phone] (Work)  
  
  
[phone] (Fax) PCP - General                   03            
  
  
  
                      Team Member Relationship Specialty  Start Date End Date  
   
                                                      
  
  
Ashley Sierra, DO  
  
  
[address]  
  
  
[place] [address]  
  
  
[phone] (Work)  
  
  
[phone] (Fax) PCP - General                   03            
  
  
  
                      Team Member Relationship Specialty  Start Date End Date  
   
                                                      
  
  
Ashley Sierra, DO  
  
  
[address]  
  
  
[place] [address]  
  
  
[phone] (Work)  
  
  
[phone] (Fax) PCP - General                   03            
  
  
  
                      Team Member Relationship Specialty  Start Date End Date  
   
                                                      
  
  
Ashley Sierra, DO  
  
  
[address]  
  
  
[place] [address]  
  
  
[phone] (Work)  
  
  
[phone] (Fax) PCP - General                   03            
  
  
  
                      Team Member Relationship Specialty  Start Date End Date  
   
                                                      
  
  
Ashley Sierra, DO  
  
  
[address]  
  
  
[place] [address]  
  
  
[phone] (Work)  
  
  
[phone] (Fax) PCP - General                   03            
  
  
  
                      Team Member Relationship Specialty  Start Date End Date  
   
                                                      
  
  
Ashley Sierra, DO  
  
  
[address] [address] [place] [facility] [address]  
  
  
[phone] (Work)  
  
  
[phone] (Fax) PCP - General                   03            
  
  
  
                      Team Member Relationship Specialty  Start Date End Date  
   
                                                      
  
  
Ashley Sierra, DO  
  
  
[address]  
  
  
[place] [address]  
  
  
[phone] (Work)  
  
  
[phone] (Fax) PCP - General                   03            
  
  
  
                      Team Member Relationship Specialty  Start Date End Date  
   
                                                      
  
  
Ashley Sierra, DO  
  
  
[address] [address] [place] [facility] [address]  
  
  
[phone] (Work)  
  
  
[phone] (Fax) PCP - General                   03            
  
  
  
                      Team Member Relationship Specialty  Start Date End Date  
   
                                                      
  
  
Ashley Sierra, DO  
  
  
[address]  
  
  
[place] [address]  
  
  
[phone] (Work)  
  
  
[phone] (Fax) PCP - General                   03            
  
  
  
                      Team Member Relationship Specialty  Start Date End Date  
   
                                                      
  
  
Ashley Sierra, DO  
  
  
[address]  
  
  
[place] [address]  
  
  
[phone] (Work)  
  
  
[phone] (Fax) PCP - General                   03            
  
  
  
                      Team Member Relationship Specialty  Start Date End Date  
   
                                                      
  
  
Ashley Sierra DO  
  
  
NPI: [id number]  
  
  
[address] [place] [address]  
  
  
[phone] (Work)  
  
  
[phone] (Fax) PCP - General                   03            
  
  
  
                      Team Member Relationship Specialty  Start Date End Date  
   
                                                      
  
  
Ashley Sierra DO  
  
  
NPI: [id number]  
  
  
[address] [address]  
  
  
[phone] (Work)  
  
  
[phone] (Fax) PCP - General                   03            
  
  
  
                      Team Member Relationship Specialty  Start Date End Date  
   
                                                      
  
  
Ashley Sierra DO  
  
  
NPI: [id number]  
  
  
[address] [address]  
  
  
[phone] (Work)  
  
  
[phone] (Fax) PCP - General                   03            
  
  
  
                      Team Member Relationship Specialty  Start Date End Date  
   
                                                      
  
  
Ashley Sierra DO  
  
  
NPI: [id number]  
  
  
[address] [place] [address]  
  
  
[phone] (Work)  
  
  
[phone] (Fax) PCP - General                   03            
  
  
  
                      Team Member Relationship Specialty  Start Date End Date  
   
                                                      
  
  
Ashley Sierra DO  
  
  
NPI: [id number]  
  
  
[address] [place] [address]  
  
  
[phone] (Work)  
  
  
[phone] (Fax) PCP - General                   03            
  
  
  
                      Team Member Relationship Specialty  Start Date End Date  
   
                                                      
  
  
Ashley Sierra DO  
  
  
NPI: [id number]  
  
  
[address] [place] [address]  
  
  
[phone] (Work)  
  
  
[phone] (Fax) PCP - General                   03            
  
  
  
                      Team Member Relationship Specialty  Start Date End Date  
   
                                                      
  
  
Ashley Sierra DO  
  
  
NPI: [id number]  
  
  
[address] [address]  
  
  
[phone] (Work)  
  
  
[phone] (Fax) PCP - General                   03            
  
  
  
                      Team Member Relationship Specialty  Start Date End Date  
   
                                                      
  
  
Ashley Sierra DO  
  
  
NPI: [id number]  
  
  
[address]  
  
  
[place] [address]  
  
  
[phone] (Work)  
  
  
[phone] (Fax) PCP - General                   03            
  
  
  
                      Team Member Relationship Specialty  Start Date End Date  
   
                                                      
  
  
Ashley Sierra DO  
  
  
NPI: [id number]  
  
  
[address]  
  
  
[place] [address]  
  
  
[phone] (Work)  
  
  
[phone] (Fax) PCP - General                   03            
  
  
  
                      Team Member Relationship Specialty  Start Date End Date  
   
                                                      
  
  
Ashley Sierra DO  
  
  
NPI: [id number]  
  
  
[address]  
  
  
[place] [address]  
  
  
[phone] (Work)  
  
  
[phone] (Fax) PCP - General                   03            
  
  
  
                      Team Member Relationship Specialty  Start Date End Date  
   
                                                      
  
  
Ashley Sierra DO  
  
  
NPI: [id number]  
  
  
[address]  
  
  
[place] [address]  
  
  
[phone] (Work)  
  
  
[phone] (Fax) PCP - General                   03            
  
  
  
                      Team Member Relationship Specialty  Start Date End Date  
   
                                                      
  
  
Ashley Sierra DO  
  
  
NPI: [id number]  
  
  
[address]  
  
  
[place] [address]  
  
  
[phone] (Work)  
  
  
[phone] (Fax) PCP - General                   03            
  
  
  
                      Team Member Relationship Specialty  Start Date End Date  
   
                                                      
  
  
Ashley Sierra DO  
  
  
NPI: [id number]  
  
  
[address]  
  
  
[place] [address]  
  
  
[phone] (Work)  
  
  
[phone] (Fax) PCP - General                   03            
  
  
  
                      Team Member Relationship Specialty  Start Date End Date  
   
                                                      
  
  
Ashley Sierra DO  
  
  
NPI: [id number]  
  
  
[address]  
  
  
[place] [address]  
  
  
[phone] (Work)  
  
  
[phone] (Fax) PCP - General                   03            
  
  
  
                      Team Member Relationship Specialty  Start Date End Date  
   
                                                      
  
  
Ashley Sierra DO  
  
  
NPI: [id number]  
  
  
[address]  
  
  
[place] [address]  
  
  
[phone] (Work)  
  
  
[phone] (Fax) PCP - General                   03            
  
  
  
                      Team Member Relationship Specialty  Start Date End Date  
   
                                                      
  
  
Ashley Sierra DO  
  
  
NPI: [id number]  
  
  
[address] [place] [address]  
  
  
[phone] (Work)  
  
  
[phone] (Fax) PCP - General                   03            
  
  
  
                      Team Member Relationship Specialty  Start Date End Date  
   
                                                      
  
  
Ashley Sierra DO  
  
  
NPI: [id number]  
  
  
[address] [address]  
  
  
[phone] (Work)  
  
  
[phone] (Fax) PCP - General                   03            
  
  
  
                      Team Member Relationship Specialty  Start Date End Date  
   
                                                      
  
  
Ashley Sierra DO  
  
  
NPI: [id number]  
  
  
[address] [address] [place] [address]  
  
  
[phone] (Work)  
  
  
[phone] (Fax) PCP - General                   03            
  
  
  
                      Team Member Relationship Specialty  Start Date End Date  
   
                                                      
  
  
Ashley Sierra DO  
  
  
NPI: [id number]  
  
  
[address] [address] [place] [address]  
  
  
[phone] (Work)  
  
  
[phone] (Fax) PCP - General                   03            
  
  
  
                                     <item>  
  
                                                    Privacy Markings (unrecogniz  
ed section and content)  
   
                                                    Section Author: Sugar Madrigal   
PROHIBITION ON REDISCLOSURE OF CONFIDENTIAL   
INFORMATION   
This notice accompanies a disclosure of information concerning a client made to 
you   
with the consent of such client. This information has been disclosed to you from
   
records protected by federal confidentiality rules (42 C.F.R. Part 2). The 
federal   
rules prohibit you from making any further disclosure of this information unless
   
further disclosure is expressly permitted by the written consent of the person 
to   
whom it pertains or as otherwise permitted by 42 C.F.R. Part 2. A general   
authorization for the release of medical or other information is NOT sufficient 
for   
this purpose.  
  
FOR RECORDS PERTAINING TO PATIENTS WHO ARE OR HAVE BEEN ENROLLED IN A CHEMICAL 
DEPENDENCY/SUBSTANCEABUSE PROGRAM, SOME INFORMATION MAY BE OMITTED. This 
clinical summary was aggregated from multiple sources. Caution should be 
exercised in using it in the provision of clinical care. This summary normalizes
 information from multiple sources, and as a consequence, information in this 
document may materially change the coding, format and clinical context of 
patient data. In addition, data may be omitted in some cases. CLINICAL DECISIONS
 SHOULD BE BASED ON THE PRIMARY CLINICAL RECORDS. [facility]. provides
 no warranty or guarantee of the accuracy or completeness of information in this
 document.

## 2025-02-03 NOTE — RAD_ITS
EXAM:  XR Thoracic Spine, 3 Views  
   
CLINICAL INDICATION:  
   
TECHNIQUE:  Frontal, lateral and swimmer's views of the thoracic spine.  
   
COMPARISON:  No relevant prior studies available.  
   
FINDINGS:  
VERTEBRAE:  Unremarkable.  No acute fracture.  Normal alignment.  
DISC SPACES:  No acute findings.  No significant narrowing.  
SOFT TISSUES:  Unremarkable.  
   
ORDER #: [id number] RAD/Thoracic Spine 3 Views  
IMPRESSION:  
No acute fracture.  
   
Electronically Signed By: Hany Flowers on 02/04/2025 10:04  
Reading Location: [id number]

## 2025-04-21 ENCOUNTER — HOSPITAL ENCOUNTER (OUTPATIENT)
Dept: HOSPITAL 100 - MTRAD | Age: 20
Discharge: HOME | End: 2025-04-21
Payer: COMMERCIAL

## 2025-04-21 DIAGNOSIS — M25.562: Primary | ICD-10-CM

## 2025-04-21 PROCEDURE — 73564 X-RAY EXAM KNEE 4 OR MORE: CPT

## 2025-07-01 ENCOUNTER — HOSPITAL ENCOUNTER (OUTPATIENT)
Age: 20
Discharge: HOME | End: 2025-07-01
Payer: COMMERCIAL

## 2025-07-01 DIAGNOSIS — Z83.49: ICD-10-CM

## 2025-07-01 DIAGNOSIS — F41.1: ICD-10-CM

## 2025-07-01 DIAGNOSIS — Z00.01: Primary | ICD-10-CM

## 2025-07-01 DIAGNOSIS — R53.83: ICD-10-CM

## 2025-07-01 LAB
ABSOLUTE NEUTROPHIL COUNT: 3.1 X10^3/UL (ref 2–7.7)
ALBUMIN SERPL-MCNC: 4.4 G/DL (ref 3.5–5)
ALBUMIN/GLOB SERPL: 1.3 RATIO (ref 0.9–2.4)
ALP SERPL-CCNC: 71 U/L (ref 35–104)
ALT SERPL-CCNC: 23 U/L (ref ?–34)
ANION GAP SERPL CALC-SCNC: 13 MMOL/L (ref 5–15)
ANISOCYTOSIS BLD QL SMEAR: (no result)
AST(SGOT): 21 U/L (ref ?–31)
BASO STIPL BLD QL SMEAR: (no result)
BASOPHIL#: 0.03 X10^3/UL
BASOPHIL%: 0.6 % (ref 0–1)
BASOPHILS NFR SPEC MANUAL: (no result) % (ref 0–1)
BITE CELLS BLD QL SMEAR: (no result)
BUN SERPL-MCNC: 8 MG/DL (ref 4–19)
BUN/CREAT SERPL: 12.5 RATIO (ref 10–20)
BURR CELLS BLD QL SMEAR: (no result)
CALCIUM SERPL-MCNC: 9.2 MG/DL (ref 7.6–11)
CHLORIDE: 105 MMOL/L (ref 98–108)
CHOLEST SERPL-MCNC: 191 MG/DL (ref ?–190)
CHOLESTEROL:HDL RATIO SCREEN: 2.71
CO2 BLDCV-SCNC: 19.4 MMOL/L (ref 21–32)
CREAT SERPL-MCNC: 0.68 MG/DL (ref 0.7–1.2)
DEPRECATED RDW RBC: 45.1 FL (ref 35.1–43.9)
DOHLE BOD BLD QL SMEAR: (no result)
EOSINOPHIL # BLD: 0.03 X10^3/UL
ERYTHROCYTE [DISTWIDTH] IN BLOOD: 13.9 % (ref 11.6–14.6)
GLOBULIN: 3.3 G/DL (ref 2.2–4.2)
GLUCOSE SERPL-MCNC: 83 MG/DL (ref 70–99)
HCT VFR BLD AUTO: 39.8 % (ref 37–47)
HDLC SERPL-MCNC: 71 MG/DL
HGB BLD-MCNC: 12.8 G/DL (ref 12–15)
HOWELL-JOLLY BOD BLD QL SMEAR: (no result)
HYPOCHROMIA BLD QL: (no result)
IMM GRANULOCYTES NFR BLD AUTO: 0.2 % (ref 0–0.9)
IMMATURE GRANULOCYTES COUNT: 0.01 X10^3/UL (ref 0–0)
LOW DENSITY LIPOPROTEIN CALC.: 104 MG/DL
LYMPHOCYTES # SPEC AUTO: 1.24 X10^3/UL (ref 0.83–4.51)
LYMPHOCYTES # SPEC AUTO: 1.24 X10^3/UL (ref 0.83–4.51)
LYMPHOCYTES NFR SPEC AUTO: 25.5 % (ref 19–41)
Lab: 0.6 % (ref 0–5)
MACROCYTES BLD QL: (no result)
MANUAL DIF COMMENT BLD-IMP: (no result)
MCH RBC QN AUTO: 28.6 PG (ref 27–32)
MCV RBC: 89 FL (ref 81–99)
MEAN CORP HGB CONC: 32.2 G/DL (ref 32–36)
MEAN PLATELET VOL.: 10.6 FL (ref 6.2–12)
MONOCYTE#: 0.42 X10^3/UL
MONOCYTE%: 8.6 % (ref 0–10)
NEUTROPHIL #: 3.14 X10^3/UL (ref 2.7–7.7)
NEUTROPHILS NFR BLD AUTO: 64.5 % (ref 47–70)
NEUTS HYPERSEG # BLD: (no result) 10*3/UL
NRBC FLAGGED BY ANALYZER: 0 % (ref 0–5)
PLATELET # BLD: 292 K/MM3 (ref 150–450)
POLYCHROMASIA BLD QL SMEAR: (no result)
POTASSIUM SPEC-MCNC: 4 MMOL/L (ref 3.3–5.1)
PROTEIN, TOTAL: 7.7 G/DL (ref 5.9–8.4)
RBC # BLD AUTO: 4.47 M/MM3 (ref 4.2–5.4)
SODIUM LEVEL: 138 MMOL/L (ref 133–145)
T4 FREE DIRECT: 1.1 NG/DL (ref 0.76–1.46)
TOTAL BILIRUBIN: 0.41 MG/DL (ref 0–1.3)
TRIGLYCERIDES: 81 MG/DL
TSH SERPL DL<=0.005 MIU/L-ACNC: 0.8 UIU/ML (ref 0.3–4.2)
VIT B12 SERPL-MCNC: 291 PG/ML (ref 180–914)
VITAMIN D,25 HYDROXY: 29.3 NG/ML (ref 30–100)
VLDLC SERPL-MCNC: 16 MG/DL (ref 5–40)
WBC # BLD AUTO: 4.9 K/MM3 (ref 4.4–11)
WBC NRBC COR # BLD: (no result) K/MM3 (ref 4.4–11)

## 2025-07-01 PROCEDURE — 80053 COMPREHEN METABOLIC PANEL: CPT

## 2025-07-01 PROCEDURE — 82607 VITAMIN B-12: CPT

## 2025-07-01 PROCEDURE — 84439 ASSAY OF FREE THYROXINE: CPT

## 2025-07-01 PROCEDURE — 84443 ASSAY THYROID STIM HORMONE: CPT

## 2025-07-01 PROCEDURE — 85025 COMPLETE CBC W/AUTO DIFF WBC: CPT

## 2025-07-01 PROCEDURE — 82306 VITAMIN D 25 HYDROXY: CPT

## 2025-07-01 PROCEDURE — 36415 COLL VENOUS BLD VENIPUNCTURE: CPT

## 2025-07-01 PROCEDURE — 80061 LIPID PANEL: CPT
